# Patient Record
Sex: FEMALE | Race: BLACK OR AFRICAN AMERICAN | NOT HISPANIC OR LATINO | Employment: UNEMPLOYED | RURAL
[De-identification: names, ages, dates, MRNs, and addresses within clinical notes are randomized per-mention and may not be internally consistent; named-entity substitution may affect disease eponyms.]

---

## 2018-10-01 ENCOUNTER — HISTORICAL (OUTPATIENT)
Dept: ADMINISTRATIVE | Facility: HOSPITAL | Age: 33
End: 2018-10-01

## 2018-10-04 LAB
LAB AP CLINICAL INFORMATION: NORMAL
LAB AP COMMENTS: NORMAL
LAB AP GENERAL CAT - HISTORICAL: NORMAL
LAB AP INTERPRETATION/RESULT - HISTORICAL: NORMAL
LAB AP SPECIMEN ADEQUACY - HISTORICAL: NORMAL
LAB AP SPECIMEN SUBMITTED - HISTORICAL: NORMAL

## 2021-11-28 ENCOUNTER — TELEPHONE (OUTPATIENT)
Dept: EMERGENCY MEDICINE | Facility: HOSPITAL | Age: 36
End: 2021-11-28
Payer: MEDICAID

## 2022-07-23 ENCOUNTER — HOSPITAL ENCOUNTER (EMERGENCY)
Facility: HOSPITAL | Age: 37
Discharge: HOME | End: 2022-07-23
Payer: MEDICAID

## 2022-07-23 VITALS
HEIGHT: 64 IN | OXYGEN SATURATION: 100 % | DIASTOLIC BLOOD PRESSURE: 70 MMHG | SYSTOLIC BLOOD PRESSURE: 115 MMHG | TEMPERATURE: 99 F | WEIGHT: 143 LBS | BODY MASS INDEX: 24.41 KG/M2 | RESPIRATION RATE: 20 BRPM | HEART RATE: 97 BPM

## 2022-07-23 DIAGNOSIS — J06.9 VIRAL URI WITH COUGH: Primary | ICD-10-CM

## 2022-07-23 LAB
FLUAV AG UPPER RESP QL IA.RAPID: NEGATIVE
FLUBV AG UPPER RESP QL IA.RAPID: NEGATIVE
SARS-COV+SARS-COV-2 AG RESP QL IA.RAPID: NEGATIVE

## 2022-07-23 PROCEDURE — 87428 SARSCOV & INF VIR A&B AG IA: CPT | Performed by: NURSE PRACTITIONER

## 2022-07-23 PROCEDURE — 99282 PR EMERGENCY DEPT VISIT,LEVEL II: ICD-10-PCS | Mod: ,,, | Performed by: NURSE PRACTITIONER

## 2022-07-23 PROCEDURE — 99282 EMERGENCY DEPT VISIT SF MDM: CPT | Mod: ,,, | Performed by: NURSE PRACTITIONER

## 2022-07-23 PROCEDURE — 99283 EMERGENCY DEPT VISIT LOW MDM: CPT

## 2022-07-23 RX ORDER — IBUPROFEN 800 MG/1
800 TABLET ORAL 3 TIMES DAILY
COMMUNITY
Start: 2022-05-09 | End: 2022-12-19 | Stop reason: SDUPTHER

## 2022-07-23 RX ORDER — CYCLOBENZAPRINE HCL 10 MG
10 TABLET ORAL NIGHTLY
COMMUNITY
Start: 2022-05-09 | End: 2023-10-24 | Stop reason: SDUPTHER

## 2022-07-23 RX ORDER — AMITRIPTYLINE HYDROCHLORIDE 25 MG/1
25 TABLET, FILM COATED ORAL NIGHTLY
COMMUNITY
Start: 2022-07-11 | End: 2022-08-31 | Stop reason: CLARIF

## 2022-07-23 NOTE — Clinical Note
"April "April" Leodan was seen and treated in our emergency department on 7/23/2022.     COVID-19 is present in our communities across the state. There is limited testing for COVID at this time, so not all patients can be tested. In this situation, your employee meets the following criteria:    Windy Alcocer has met the criteria for COVID-19 testing and has a NEGATIVE result. The employee can return to work once they are asymptomatic for 24 hours without the use of fever reducing medications (Tylenol, Motrin, etc).     If the employee is not fully vaccinated and had a close contact:  · Retest at 5 to 7 days post-exposure  · If possible, it is recommended that they quarantine for 5 days from the time of contact regardless of their test status.  · A mask should be worn post quarantine for 5 days.    If you have any questions or concerns, or if I can be of further assistance, please do not hesitate to contact me.    Sincerely,             HARDY Nunes"

## 2022-07-24 NOTE — DISCHARGE INSTRUCTIONS
Use over the counter Ibuprofen/Tylenol as needed for pain/fever.  Drink plenty of water.  Take over the counter meds for nasal congestion. Return for shortness of breath or chest pain.

## 2022-07-24 NOTE — ED PROVIDER NOTES
Encounter Date: 7/23/2022       History     Chief Complaint   Patient presents with    COVID-19 Concerns    Cough    Headache     Started 1 day ago     37 year old AAF presents to the ER after COVID exposure with c/o cough, nasal drainage/congestion since yesterday. Denies chest pain and SOB.    The history is provided by the patient.   Influenza  This is a new problem. The current episode started yesterday. The problem occurs constantly. The problem has not changed since onset.Pertinent negatives include no chest pain, no abdominal pain, no headaches and no shortness of breath. Nothing aggravates the symptoms. Nothing relieves the symptoms. She has tried nothing for the symptoms.     Review of patient's allergies indicates:  No Known Allergies  History reviewed. No pertinent past medical history.  Past Surgical History:   Procedure Laterality Date    BREAST SURGERY       History reviewed. No pertinent family history.  Social History     Tobacco Use    Smoking status: Current Some Day Smoker     Types: Cigars    Smokeless tobacco: Never Used   Substance Use Topics    Alcohol use: Never    Drug use: Never     Review of Systems   Constitutional: Negative for chills, fatigue and fever.   HENT: Positive for congestion and rhinorrhea. Negative for dental problem, drooling, ear discharge, ear pain, facial swelling, hearing loss, mouth sores, nosebleeds, postnasal drip, sinus pressure, sinus pain, sneezing and sore throat.    Respiratory: Negative for cough, shortness of breath and wheezing.    Cardiovascular: Negative for chest pain.   Gastrointestinal: Negative for abdominal pain, diarrhea, nausea and vomiting.   Genitourinary: Negative for dysuria.   Musculoskeletal: Negative for back pain.   Skin: Negative for rash.   Neurological: Negative for weakness and headaches.   Hematological: Does not bruise/bleed easily.       Physical Exam     Initial Vitals [07/23/22 2116]   BP Pulse Resp Temp SpO2   115/70 97 20  98.7 °F (37.1 °C) 100 %      MAP       --         Physical Exam    Nursing note and vitals reviewed.  Constitutional: She appears well-developed and well-nourished. She is not diaphoretic. She is cooperative.  Non-toxic appearance. She does not have a sickly appearance. She does not appear ill. No distress. She is not intubated.   HENT:   Head: Normocephalic and atraumatic.   Eyes: Pupils are equal, round, and reactive to light.   Neck: Neck supple.   Cardiovascular: Normal rate, regular rhythm, normal heart sounds and intact distal pulses. Exam reveals no gallop and no friction rub.    No murmur heard.  Pulmonary/Chest: Effort normal and breath sounds normal. No accessory muscle usage. No apnea, no tachypnea and no bradypnea. She is not intubated. No respiratory distress. She has no decreased breath sounds. She has no wheezes. She has no rhonchi. She has no rales. She exhibits no tenderness.   Musculoskeletal:      Cervical back: Neck supple.     Neurological: She is alert and oriented to person, place, and time.   Skin: Skin is warm and dry. Capillary refill takes less than 2 seconds.   Psychiatric: She has a normal mood and affect.         Medical Screening Exam   See Full Note    ED Course   Procedures  Labs Reviewed   SARS-COV2 (COVID) W/ FLU ANTIGEN - Normal    Narrative:     Negative SARS-CoV results should not be used as the sole basis for treatment or patient management decisions; negative results should be considered in the context of a patient's recent exposures, history and the presene of clinical signs and symptoms consistent with COVID-19.  Negative results should be treated as presumptive and confirmed by molecular assay, if necessary for patient management.          Imaging Results    None          Medications - No data to display                    Clinical Impression:   Final diagnoses:  [J06.9] Viral URI with cough (Primary)          ED Disposition Condition    Discharge Stable        ED  Prescriptions     None        Follow-up Information     Follow up With Specialties Details Why Contact Info    [place] [facility]  Schedule an appointment as soon as possible for a visit in 3 days As needed, If symptoms worsen [address] [facility] [phone]           Yue Pena, HARDY  07/23/22 [id number]

## 2022-08-30 ENCOUNTER — HOSPITAL ENCOUNTER (EMERGENCY)
Facility: HOSPITAL | Age: 37
Discharge: HOME | End: 2022-08-30
Payer: MEDICAID

## 2022-08-30 VITALS
BODY MASS INDEX: 21.33 KG/M2 | RESPIRATION RATE: 18 BRPM | TEMPERATURE: 98 F | SYSTOLIC BLOOD PRESSURE: 129 MMHG | HEIGHT: 65 IN | OXYGEN SATURATION: 97 % | WEIGHT: 128 LBS | HEART RATE: 88 BPM | DIASTOLIC BLOOD PRESSURE: 89 MMHG

## 2022-08-30 DIAGNOSIS — F43.29 GRIEF REACTION WITH PROLONGED BEREAVEMENT: Primary | ICD-10-CM

## 2022-08-30 PROCEDURE — 99282 EMERGENCY DEPT VISIT SF MDM: CPT | Mod: ,,, | Performed by: NURSE PRACTITIONER

## 2022-08-30 PROCEDURE — 99283 EMERGENCY DEPT VISIT LOW MDM: CPT

## 2022-08-30 PROCEDURE — 99282 PR EMERGENCY DEPT VISIT,LEVEL II: ICD-10-PCS | Mod: ,,, | Performed by: NURSE PRACTITIONER

## 2022-08-30 NOTE — Clinical Note
"April "April" Leodan was seen and treated in our emergency department on 8/30/2022.  She may return to work on 08/31/2022.       If you have any questions or concerns, please don't hesitate to call.      HARDY Small"

## 2022-08-31 ENCOUNTER — OFFICE VISIT (OUTPATIENT)
Dept: FAMILY MEDICINE | Facility: CLINIC | Age: 37
End: 2022-08-31
Payer: MEDICAID

## 2022-08-31 VITALS
HEART RATE: 88 BPM | WEIGHT: 128 LBS | BODY MASS INDEX: 21.33 KG/M2 | DIASTOLIC BLOOD PRESSURE: 88 MMHG | SYSTOLIC BLOOD PRESSURE: 130 MMHG | HEIGHT: 65 IN

## 2022-08-31 DIAGNOSIS — F41.9 ANXIETY: ICD-10-CM

## 2022-08-31 DIAGNOSIS — F32.9 REACTIVE DEPRESSION: Primary | ICD-10-CM

## 2022-08-31 PROCEDURE — 99203 PR OFFICE/OUTPT VISIT, NEW, LEVL III, 30-44 MIN: ICD-10-PCS | Mod: ,,, | Performed by: FAMILY MEDICINE

## 2022-08-31 PROCEDURE — 3075F SYST BP GE 130 - 139MM HG: CPT | Mod: CPTII,,, | Performed by: FAMILY MEDICINE

## 2022-08-31 PROCEDURE — 1160F RVW MEDS BY RX/DR IN RCRD: CPT | Mod: CPTII,,, | Performed by: FAMILY MEDICINE

## 2022-08-31 PROCEDURE — 3008F PR BODY MASS INDEX (BMI) DOCUMENTED: ICD-10-PCS | Mod: CPTII,,, | Performed by: FAMILY MEDICINE

## 2022-08-31 PROCEDURE — 1160F PR REVIEW ALL MEDS BY PRESCRIBER/CLIN PHARMACIST DOCUMENTED: ICD-10-PCS | Mod: CPTII,,, | Performed by: FAMILY MEDICINE

## 2022-08-31 PROCEDURE — 3008F BODY MASS INDEX DOCD: CPT | Mod: CPTII,,, | Performed by: FAMILY MEDICINE

## 2022-08-31 PROCEDURE — 1159F MED LIST DOCD IN RCRD: CPT | Mod: CPTII,,, | Performed by: FAMILY MEDICINE

## 2022-08-31 PROCEDURE — 3079F DIAST BP 80-89 MM HG: CPT | Mod: CPTII,,, | Performed by: FAMILY MEDICINE

## 2022-08-31 PROCEDURE — 1159F PR MEDICATION LIST DOCUMENTED IN MEDICAL RECORD: ICD-10-PCS | Mod: CPTII,,, | Performed by: FAMILY MEDICINE

## 2022-08-31 PROCEDURE — 3079F PR MOST RECENT DIASTOLIC BLOOD PRESSURE 80-89 MM HG: ICD-10-PCS | Mod: CPTII,,, | Performed by: FAMILY MEDICINE

## 2022-08-31 PROCEDURE — 3075F PR MOST RECENT SYSTOLIC BLOOD PRESS GE 130-139MM HG: ICD-10-PCS | Mod: CPTII,,, | Performed by: FAMILY MEDICINE

## 2022-08-31 PROCEDURE — 99203 OFFICE O/P NEW LOW 30 MIN: CPT | Mod: ,,, | Performed by: FAMILY MEDICINE

## 2022-08-31 RX ORDER — ALPRAZOLAM 0.5 MG/1
0.5 TABLET ORAL 2 TIMES DAILY PRN
Qty: 15 TABLET | Refills: 0 | Status: SHIPPED | OUTPATIENT
Start: 2022-08-31 | End: 2022-09-19 | Stop reason: SDUPTHER

## 2022-08-31 RX ORDER — SERTRALINE HYDROCHLORIDE 25 MG/1
25 TABLET, FILM COATED ORAL DAILY
Qty: 30 TABLET | Refills: 1 | Status: SHIPPED | OUTPATIENT
Start: 2022-08-31 | End: 2022-09-19 | Stop reason: SDUPTHER

## 2022-08-31 NOTE — PROGRESS NOTES
Ezra Morrison IV, DO  [facility]  [address] [place] [address]  Phone: [phone]      Subjective     Name: Windy Alcocer   Sex: female  YOB: 1985 (37 y.o.)  MRN: [id number]  Visit Date: 08/31/2022   Chief Complaint: Follow-up (F/u from ED visit )        HISTORY OF PRESENT ILLNESS:    Patient presents as a follow-up from emergency room visit.  She has recently lost her only sister and is suffering grief related to this.  PHQ-9 office score 14 today.  After discussing with the patient stages of grief and expectations agreement was made to begin sertraline with the expectation that it will take a few weeks to start working.  I have prescribed 0.5 mg Xanax p.o. up to b.i.d. p.r.n. for anxiety in the meantime and help her sleep.  This was limited to 15 pills and she is aware of the drugs class and its use.  Patient to return to clinic in 1 month for repeat PHQ-9, to discuss coping mechanisms, and titrate medication.      PAST MEDICAL HISTORY:  Significant Diagnoses - Patient  has no past medical history on file.  Medications - Patient has a current medication list which includes the following long-term medication(s): sertraline.   Allergies - Patient has No Known Allergies.  Surgeries - Patient  has a past surgical history that includes Breast surgery.  Family History - Patient family history is not on file.      SOCIAL HISTORY:  Tobacco - Patient  reports that she has been smoking cigars. She has never used smokeless tobacco.   Alcohol - Patient  reports no history of alcohol use.   Recreational Drugs - Patient  reports no history of drug use.       Review of Systems   Constitutional:  Negative for fatigue and fever.   HENT:  Negative for nasal congestion and sore throat.    Respiratory:  Negative for cough and shortness of breath.    Cardiovascular:  Negative for chest pain.   Gastrointestinal:  Negative for abdominal pain, nausea and vomiting.   Genitourinary:  " Negative for dysuria.   Musculoskeletal:  Negative for myalgias.   Integumentary:  Negative for rash.   Neurological:  Negative for dizziness, weakness and headaches.   Psychiatric/Behavioral:  Positive for decreased concentration and sleep disturbance. Negative for agitation, behavioral problems, confusion, dysphoric mood, hallucinations, self-injury and suicidal ideas. The patient is nervous/anxious and is hyperactive.         No past medical history on file.     Review of patient's allergies indicates:  No Known Allergies     Past Surgical History:   Procedure Laterality Date    BREAST SURGERY          No family history on file.    Current Outpatient Medications   Medication Instructions    ALPRAZolam (XANAX) 0.5 mg, Oral, 2 times daily PRN    cyclobenzaprine (FLEXERIL) 10 mg, Oral, Nightly    ibuprofen (ADVIL,MOTRIN) 800 mg, Oral, 3 times daily    sertraline (ZOLOFT) 25 mg, Oral, Daily        Objective     /88   Pulse 88   Ht 5' 5" (1.651 m)   Wt 58.1 kg (128 lb)   BMI 21.30 kg/m²     Physical Exam  Constitutional:       General: She is not in acute distress.     Appearance: Normal appearance. She is not ill-appearing.   HENT:      Head: Normocephalic and atraumatic.      Right Ear: External ear normal.      Left Ear: External ear normal.   Eyes:      Extraocular Movements: Extraocular movements intact.      Conjunctiva/sclera: Conjunctivae normal.   Cardiovascular:      Rate and Rhythm: Normal rate.      Heart sounds: No murmur heard.  Pulmonary:      Effort: Pulmonary effort is normal.   Musculoskeletal:      Cervical back: Normal range of motion.   Skin:     General: Skin is warm and dry.      Coloration: Skin is not jaundiced.      Findings: No rash.   Neurological:      Mental Status: She is alert.   Psychiatric:         Mood and Affect: Mood normal.        All recently obtained labs have been reviewed and discussed in detail with the patient.   Assessment     1. Reactive depression    2. " Anxiety         Plan        Problem List Items Addressed This Visit    None  Visit Diagnoses       Reactive depression    -  Primary    Relevant Medications    sertraline (ZOLOFT) 25 MG tablet    Anxiety        Relevant Medications    ALPRAZolam (XANAX) 0.5 MG tablet            No follow-ups on file.    Ezra Morrison DO  [facility]

## 2022-08-31 NOTE — ED PROVIDER NOTES
Encounter Date: 8/30/2022       History     Chief Complaint   Patient presents with    Anxiety     Pt c/o increased anxiety since the death of her sister one month ago.      Patient presents to the ED with complaints of anxiety, reports she lost her sister one month ago and she has been struggling with grief since then. Denies any homicidal or suicidal ideations.       Review of patient's allergies indicates:  No Known Allergies  No past medical history on file.  Past Surgical History:   Procedure Laterality Date    BREAST SURGERY       No family history on file.  Social History     Tobacco Use    Smoking status: Some Days     Types: Cigars    Smokeless tobacco: Never   Substance Use Topics    Alcohol use: Never    Drug use: Never     Review of Systems   Constitutional: Negative.    Respiratory: Negative.     Cardiovascular: Negative.    Musculoskeletal: Negative.    Skin: Negative.    Neurological: Negative.    Psychiatric/Behavioral:  The patient is nervous/anxious.    All other systems reviewed and are negative.    Physical Exam     Initial Vitals [08/30/22 2134]   BP Pulse Resp Temp SpO2   129/89 96 18 98.3 °F (36.8 °C) 100 %      MAP       --         Physical Exam    Vitals reviewed.  Constitutional: She appears well-developed and well-nourished.   Neck: Neck supple.   Normal range of motion.  Cardiovascular:  Normal rate, regular rhythm, normal heart sounds and intact distal pulses.           Pulmonary/Chest: Breath sounds normal.   Musculoskeletal:         General: Normal range of motion.      Cervical back: Normal range of motion and neck supple.     Neurological: She is alert and oriented to person, place, and time. She has normal strength. GCS score is 15. GCS eye subscore is 4. GCS verbal subscore is 5. GCS motor subscore is 6.   Skin: Skin is warm. Capillary refill takes less than 2 seconds.   Psychiatric: Her behavior is normal. Judgment and thought content normal. Her mood appears anxious. Her speech  is rapid and/or pressured. Cognition and memory are normal.       Medical Screening Exam   See Full Note    ED Course   Procedures  Labs Reviewed - No data to display       Imaging Results    None          Medications - No data to display  Medical Decision Making:   ED Management:  Patient drove herself, patient was instructed to follow up with [facility] tomorrow.                  Clinical Impression:   Final diagnoses:  [F43.29] Grief reaction with prolonged bereavement (Primary)      ED Disposition Condition    Discharge Stable          ED Prescriptions    None       Follow-up Information    None          HARDY Small  08/30/22 [id number]

## 2022-08-31 NOTE — ADDENDUM NOTE
Encounter addended by: Heidi Nova on: 8/31/2022 10:35 AM   Actions taken: SmartForm saved, Flowsheet accepted

## 2022-08-31 NOTE — LETTER
August 31, 2022      [facility]  [address]  [place] [address]  Phone: [phone]  Fax: [phone]       Patient: Windy Alcocer   YOB: 1985  Date of Visit: 08/31/2022    To Whom It May Concern:    Mena Alcocer  was at [facility] on 08/31/2022. The patient may return to work/school on 09/05/22 with no restrictions. If you have any questions or concerns, or if I can be of further assistance, please do not hesitate to contact me.    Sincerely,    Dominique Mendoza RN

## 2022-09-19 ENCOUNTER — OFFICE VISIT (OUTPATIENT)
Dept: FAMILY MEDICINE | Facility: CLINIC | Age: 37
End: 2022-09-19
Payer: MEDICAID

## 2022-09-19 VITALS
HEART RATE: 84 BPM | WEIGHT: 128 LBS | BODY MASS INDEX: 21.33 KG/M2 | DIASTOLIC BLOOD PRESSURE: 88 MMHG | SYSTOLIC BLOOD PRESSURE: 130 MMHG | HEIGHT: 65 IN

## 2022-09-19 DIAGNOSIS — F32.9 REACTIVE DEPRESSION: ICD-10-CM

## 2022-09-19 DIAGNOSIS — F41.9 ANXIETY: ICD-10-CM

## 2022-09-19 PROCEDURE — 3079F PR MOST RECENT DIASTOLIC BLOOD PRESSURE 80-89 MM HG: ICD-10-PCS | Mod: CPTII,,, | Performed by: FAMILY MEDICINE

## 2022-09-19 PROCEDURE — 1160F RVW MEDS BY RX/DR IN RCRD: CPT | Mod: CPTII,,, | Performed by: FAMILY MEDICINE

## 2022-09-19 PROCEDURE — 1160F PR REVIEW ALL MEDS BY PRESCRIBER/CLIN PHARMACIST DOCUMENTED: ICD-10-PCS | Mod: CPTII,,, | Performed by: FAMILY MEDICINE

## 2022-09-19 PROCEDURE — 99213 OFFICE O/P EST LOW 20 MIN: CPT | Mod: ,,, | Performed by: FAMILY MEDICINE

## 2022-09-19 PROCEDURE — 1159F PR MEDICATION LIST DOCUMENTED IN MEDICAL RECORD: ICD-10-PCS | Mod: CPTII,,, | Performed by: FAMILY MEDICINE

## 2022-09-19 PROCEDURE — 3079F DIAST BP 80-89 MM HG: CPT | Mod: CPTII,,, | Performed by: FAMILY MEDICINE

## 2022-09-19 PROCEDURE — 3008F BODY MASS INDEX DOCD: CPT | Mod: CPTII,,, | Performed by: FAMILY MEDICINE

## 2022-09-19 PROCEDURE — 1159F MED LIST DOCD IN RCRD: CPT | Mod: CPTII,,, | Performed by: FAMILY MEDICINE

## 2022-09-19 PROCEDURE — 3008F PR BODY MASS INDEX (BMI) DOCUMENTED: ICD-10-PCS | Mod: CPTII,,, | Performed by: FAMILY MEDICINE

## 2022-09-19 PROCEDURE — 99213 PR OFFICE/OUTPT VISIT, EST, LEVL III, 20-29 MIN: ICD-10-PCS | Mod: ,,, | Performed by: FAMILY MEDICINE

## 2022-09-19 PROCEDURE — 3075F SYST BP GE 130 - 139MM HG: CPT | Mod: CPTII,,, | Performed by: FAMILY MEDICINE

## 2022-09-19 PROCEDURE — 3075F PR MOST RECENT SYSTOLIC BLOOD PRESS GE 130-139MM HG: ICD-10-PCS | Mod: CPTII,,, | Performed by: FAMILY MEDICINE

## 2022-09-19 RX ORDER — ALPRAZOLAM 0.5 MG/1
0.5 TABLET ORAL 2 TIMES DAILY PRN
Qty: 15 TABLET | Refills: 0 | Status: SHIPPED | OUTPATIENT
Start: 2022-09-19 | End: 2022-09-28

## 2022-09-19 RX ORDER — SERTRALINE HYDROCHLORIDE 25 MG/1
25 TABLET, FILM COATED ORAL DAILY
Qty: 30 TABLET | Refills: 1 | Status: SHIPPED | OUTPATIENT
Start: 2022-09-19 | End: 2022-09-28

## 2022-09-19 RX ORDER — BUSPIRONE HYDROCHLORIDE 5 MG/1
5 TABLET ORAL 2 TIMES DAILY
Qty: 60 TABLET | Refills: 0 | Status: SHIPPED | OUTPATIENT
Start: 2022-09-19 | End: 2022-09-28

## 2022-09-19 NOTE — PROGRESS NOTES
Ezra Morrison IV, DO  [facility]  [address] [place] [address]  Phone: [phone]      Subjective     Name: Windy Alcocer   Sex: female  YOB: 1985 (37 y.o.)  MRN: [id number]  Visit Date: 09/19/2022   Chief Complaint: Follow-up        HISTORY OF PRESENT ILLNESS:    Patient presents to clinic today for follow-up.  Previously major depression with a PHQ-9 score of 14.  We started sertraline I allowed for some Xanax 0.5 mg for breakthrough anxiety.  PHQ-9 in office today of 7.  This is a large improvement over previous and we will continue the sertraline as we are for now.  She still experiencing anxiety due to situational factors and I am going to continue to treat that with Xanax in the meantime.  I am also and BusPar.  I am not sure if she has tried it yet and she states that she thinks she has but was unable to tolerate it.  We will trial it at this time patient is to return to clinic with worsening symptoms.        PAST MEDICAL HISTORY:  Significant Diagnoses - Patient  has no past medical history on file.  Medications - Patient has a current medication list which includes the following long-term medication(s): sertraline.   Allergies - Patient has No Known Allergies.  Surgeries - Patient  has a past surgical history that includes Breast surgery.  Family History - Patient family history is not on file.      SOCIAL HISTORY:  Tobacco - Patient  reports that she has been smoking cigars. She has never used smokeless tobacco.   Alcohol - Patient  reports no history of alcohol use.   Recreational Drugs - Patient  reports no history of drug use.       Review of Systems   Constitutional:  Negative for fatigue and fever.   HENT:  Negative for nasal congestion and sore throat.    Respiratory:  Negative for cough and shortness of breath.    Cardiovascular:  Negative for chest pain.   Gastrointestinal:  Negative for abdominal pain, nausea and vomiting.   Genitourinary:   "Negative for dysuria.   Musculoskeletal:  Negative for myalgias.   Integumentary:  Negative for rash.   Neurological:  Negative for dizziness, weakness and headaches.        No past medical history on file.     Review of patient's allergies indicates:  No Known Allergies     Past Surgical History:   Procedure Laterality Date    BREAST SURGERY          No family history on file.    Current Outpatient Medications   Medication Instructions    ALPRAZolam (XANAX) 0.5 mg, Oral, 2 times daily PRN    cyclobenzaprine (FLEXERIL) 10 mg, Oral, Nightly    ibuprofen (ADVIL,MOTRIN) 800 mg, Oral, 3 times daily    sertraline (ZOLOFT) 25 mg, Oral, Daily        Objective     /88   Pulse 84   Ht 5' 5" (1.651 m)   Wt 58.1 kg (128 lb)   BMI 21.30 kg/m²     Physical Exam  Constitutional:       General: She is not in acute distress.     Appearance: Normal appearance. She is not ill-appearing.   HENT:      Head: Normocephalic and atraumatic.      Right Ear: External ear normal.      Left Ear: External ear normal.   Eyes:      Extraocular Movements: Extraocular movements intact.      Conjunctiva/sclera: Conjunctivae normal.   Cardiovascular:      Rate and Rhythm: Normal rate.      Heart sounds: No murmur heard.  Pulmonary:      Effort: Pulmonary effort is normal.   Musculoskeletal:      Cervical back: Normal range of motion.   Skin:     General: Skin is warm and dry.      Coloration: Skin is not jaundiced.      Findings: No rash.   Neurological:      Mental Status: She is alert.   Psychiatric:         Mood and Affect: Mood normal.        All recently obtained labs have been reviewed and discussed in detail with the patient.   Assessment     No diagnosis found.     Plan        Problem List Items Addressed This Visit    None      No follow-ups on file.    Ezra Morrison, DO  [facility]         "

## 2022-09-28 ENCOUNTER — OFFICE VISIT (OUTPATIENT)
Dept: FAMILY MEDICINE | Facility: CLINIC | Age: 37
End: 2022-09-28
Payer: MEDICAID

## 2022-09-28 VITALS
WEIGHT: 128 LBS | HEIGHT: 65 IN | DIASTOLIC BLOOD PRESSURE: 83 MMHG | SYSTOLIC BLOOD PRESSURE: 130 MMHG | BODY MASS INDEX: 21.33 KG/M2 | HEART RATE: 110 BPM

## 2022-09-28 DIAGNOSIS — F32.9 REACTIVE DEPRESSION: ICD-10-CM

## 2022-09-28 DIAGNOSIS — F41.9 ANXIETY: Primary | ICD-10-CM

## 2022-09-28 PROCEDURE — 3079F DIAST BP 80-89 MM HG: CPT | Mod: CPTII,,, | Performed by: FAMILY MEDICINE

## 2022-09-28 PROCEDURE — 3008F BODY MASS INDEX DOCD: CPT | Mod: CPTII,,, | Performed by: FAMILY MEDICINE

## 2022-09-28 PROCEDURE — 1159F PR MEDICATION LIST DOCUMENTED IN MEDICAL RECORD: ICD-10-PCS | Mod: CPTII,,, | Performed by: FAMILY MEDICINE

## 2022-09-28 PROCEDURE — 3075F SYST BP GE 130 - 139MM HG: CPT | Mod: CPTII,,, | Performed by: FAMILY MEDICINE

## 2022-09-28 PROCEDURE — 3079F PR MOST RECENT DIASTOLIC BLOOD PRESSURE 80-89 MM HG: ICD-10-PCS | Mod: CPTII,,, | Performed by: FAMILY MEDICINE

## 2022-09-28 PROCEDURE — 1159F MED LIST DOCD IN RCRD: CPT | Mod: CPTII,,, | Performed by: FAMILY MEDICINE

## 2022-09-28 PROCEDURE — 99213 PR OFFICE/OUTPT VISIT, EST, LEVL III, 20-29 MIN: ICD-10-PCS | Mod: ,,, | Performed by: FAMILY MEDICINE

## 2022-09-28 PROCEDURE — 99213 OFFICE O/P EST LOW 20 MIN: CPT | Mod: ,,, | Performed by: FAMILY MEDICINE

## 2022-09-28 PROCEDURE — 1160F PR REVIEW ALL MEDS BY PRESCRIBER/CLIN PHARMACIST DOCUMENTED: ICD-10-PCS | Mod: CPTII,,, | Performed by: FAMILY MEDICINE

## 2022-09-28 PROCEDURE — 3008F PR BODY MASS INDEX (BMI) DOCUMENTED: ICD-10-PCS | Mod: CPTII,,, | Performed by: FAMILY MEDICINE

## 2022-09-28 PROCEDURE — 3075F PR MOST RECENT SYSTOLIC BLOOD PRESS GE 130-139MM HG: ICD-10-PCS | Mod: CPTII,,, | Performed by: FAMILY MEDICINE

## 2022-09-28 PROCEDURE — 1160F RVW MEDS BY RX/DR IN RCRD: CPT | Mod: CPTII,,, | Performed by: FAMILY MEDICINE

## 2022-09-28 RX ORDER — CLONAZEPAM 0.5 MG/1
0.5 TABLET ORAL DAILY
Qty: 30 TABLET | Refills: 2 | Status: SHIPPED | OUTPATIENT
Start: 2022-09-28 | End: 2022-12-19 | Stop reason: SDUPTHER

## 2022-09-28 RX ORDER — SERTRALINE HYDROCHLORIDE 50 MG/1
50 TABLET, FILM COATED ORAL DAILY
Qty: 30 TABLET | Refills: 3 | Status: SHIPPED | OUTPATIENT
Start: 2022-09-28 | End: 2022-12-19 | Stop reason: SDUPTHER

## 2022-09-28 NOTE — PROGRESS NOTES
Ezra Morrison IV, DO  [facility]  [address] [place] [address]  Phone: [phone]      Subjective     Name: Windy Alcocer   Sex: female  YOB: 1985 (37 y.o.)  MRN: [id number]  Visit Date: 09/28/2022   Chief Complaint: Follow-up (Medication refill )        HISTORY OF PRESENT ILLNESS:    Patient presents clinic today for follow-up.  She has a history of anxiety and depression.  Today's PHQ 2 score of 1.  Ameya 7 score still elevated with moderate to severe anxiety.  Patient is currently on Zoloft 25 mg p.o. q.d. and I will be increasing that to 50 mg p.o. q.d. today we also did a trial of BusPar which patient could not handle side effects.  So I will be discontinuing this medication today I will also be discontinuing the Xanax that we were using for breakthrough anxiety.  Patient has many extremity was factors including her mother now in ICU.  We will be changing to Klonopin 0.5 mg p.o. q.d. will give that a trial for the next couple months.  Patient should return to clinic in 2 months or sooner if needed.        This document was dictated using [employer] fluency direct.  It is subject to dictation errors.    PAST MEDICAL HISTORY:  Significant Diagnoses - Patient  has no past medical history on file.  Medications - Patient has a current medication list which includes the following long-term medication(s): alprazolam, buspirone, and sertraline.   Allergies - Patient has No Known Allergies.  Surgeries - Patient  has a past surgical history that includes Breast surgery.  Family History - Patient family history is not on file.      SOCIAL HISTORY:  Tobacco - Patient  reports that she has been smoking cigars. She has never used smokeless tobacco.   Alcohol - Patient  reports no history of alcohol use.   Recreational Drugs - Patient  reports no history of drug use.       Review of Systems   Constitutional:  Negative for fatigue and fever.   HENT:  Negative for nasal  "congestion and sore throat.    Respiratory:  Negative for cough and shortness of breath.    Cardiovascular:  Negative for chest pain.   Gastrointestinal:  Negative for abdominal pain, nausea and vomiting.   Genitourinary:  Negative for dysuria.   Musculoskeletal:  Negative for myalgias.   Integumentary:  Negative for rash.   Neurological:  Negative for dizziness, weakness and headaches.        No past medical history on file.     Review of patient's allergies indicates:  No Known Allergies     Past Surgical History:   Procedure Laterality Date    BREAST SURGERY          No family history on file.    Current Outpatient Medications   Medication Instructions    ALPRAZolam (XANAX) 0.5 mg, Oral, 2 times daily PRN    busPIRone (BUSPAR) 5 mg, Oral, 2 times daily    cyclobenzaprine (FLEXERIL) 10 mg, Oral, Nightly    ibuprofen (ADVIL,MOTRIN) 800 mg, Oral, 3 times daily    sertraline (ZOLOFT) 25 mg, Oral, Daily        Objective     /83   Pulse 110   Ht 5' 5" (1.651 m)   Wt 58.1 kg (128 lb)   BMI 21.30 kg/m²     Physical Exam  Constitutional:       General: She is not in acute distress.     Appearance: Normal appearance. She is not ill-appearing.   HENT:      Head: Normocephalic and atraumatic.      Right Ear: External ear normal.      Left Ear: External ear normal.   Eyes:      Extraocular Movements: Extraocular movements intact.      Conjunctiva/sclera: Conjunctivae normal.   Cardiovascular:      Rate and Rhythm: Normal rate.      Heart sounds: No murmur heard.  Pulmonary:      Effort: Pulmonary effort is normal.   Musculoskeletal:      Cervical back: Normal range of motion.   Skin:     General: Skin is warm and dry.      Coloration: Skin is not jaundiced.      Findings: No rash.   Neurological:      Mental Status: She is alert.   Psychiatric:         Mood and Affect: Mood normal.        All recently obtained labs have been reviewed and discussed in detail with the patient.   Assessment     No diagnosis " found.     Plan        Problem List Items Addressed This Visit    None      No follow-ups on file.    Patient advised that is symptoms worsen, they should call or report directly to local emergency department.    Ezra Morrison,   [facility]

## 2022-10-19 ENCOUNTER — OFFICE VISIT (OUTPATIENT)
Dept: FAMILY MEDICINE | Facility: CLINIC | Age: 37
End: 2022-10-19
Payer: MEDICAID

## 2022-10-19 VITALS
WEIGHT: 127 LBS | SYSTOLIC BLOOD PRESSURE: 111 MMHG | DIASTOLIC BLOOD PRESSURE: 79 MMHG | BODY MASS INDEX: 21.16 KG/M2 | HEART RATE: 125 BPM | HEIGHT: 65 IN

## 2022-10-19 DIAGNOSIS — R35.0 FREQUENCY OF URINATION: Primary | ICD-10-CM

## 2022-10-19 DIAGNOSIS — R82.81 PYURIA: ICD-10-CM

## 2022-10-19 LAB
BILIRUB SERPL-MCNC: NORMAL MG/DL
BLOOD URINE, POC: NORMAL
COLOR, POC UA: NORMAL
GLUCOSE UR QL STRIP: 100
KETONES UR QL STRIP: NORMAL
LEUKOCYTE ESTERASE URINE, POC: NORMAL
NITRITE, POC UA: NORMAL
PH, POC UA: 5.5
PROTEIN, POC: 30
SPECIFIC GRAVITY, POC UA: 1.02
UROBILINOGEN, POC UA: 2

## 2022-10-19 PROCEDURE — 3078F DIAST BP <80 MM HG: CPT | Mod: CPTII,,, | Performed by: FAMILY MEDICINE

## 2022-10-19 PROCEDURE — 99213 OFFICE O/P EST LOW 20 MIN: CPT | Mod: ,,, | Performed by: FAMILY MEDICINE

## 2022-10-19 PROCEDURE — 3074F SYST BP LT 130 MM HG: CPT | Mod: CPTII,,, | Performed by: FAMILY MEDICINE

## 2022-10-19 PROCEDURE — 99213 PR OFFICE/OUTPT VISIT, EST, LEVL III, 20-29 MIN: ICD-10-PCS | Mod: ,,, | Performed by: FAMILY MEDICINE

## 2022-10-19 PROCEDURE — 3074F PR MOST RECENT SYSTOLIC BLOOD PRESSURE < 130 MM HG: ICD-10-PCS | Mod: CPTII,,, | Performed by: FAMILY MEDICINE

## 2022-10-19 PROCEDURE — 1159F MED LIST DOCD IN RCRD: CPT | Mod: CPTII,,, | Performed by: FAMILY MEDICINE

## 2022-10-19 PROCEDURE — 3078F PR MOST RECENT DIASTOLIC BLOOD PRESSURE < 80 MM HG: ICD-10-PCS | Mod: CPTII,,, | Performed by: FAMILY MEDICINE

## 2022-10-19 PROCEDURE — 81003 URINALYSIS AUTO W/O SCOPE: CPT | Mod: RHCUB | Performed by: FAMILY MEDICINE

## 2022-10-19 PROCEDURE — 1159F PR MEDICATION LIST DOCUMENTED IN MEDICAL RECORD: ICD-10-PCS | Mod: CPTII,,, | Performed by: FAMILY MEDICINE

## 2022-10-19 RX ORDER — SULFAMETHOXAZOLE AND TRIMETHOPRIM 800; 160 MG/1; MG/1
1 TABLET ORAL 2 TIMES DAILY
Qty: 10 TABLET | Refills: 0 | Status: SHIPPED | OUTPATIENT
Start: 2022-10-19 | End: 2022-10-24

## 2022-10-19 RX ORDER — FLUCONAZOLE 150 MG/1
150 TABLET ORAL
Qty: 2 TABLET | Refills: 0 | Status: SHIPPED | OUTPATIENT
Start: 2022-10-19 | End: 2022-10-23

## 2022-10-19 NOTE — PROGRESS NOTES
Ezra Morrison IV, DO  [facility]  [address] [place] [address]  Phone: [phone]      Subjective     Name: Windy Alcocer   Sex: female  YOB: 1985 (37 y.o.)  MRN: [id number]  Visit Date: 10/19/2022   Chief Complaint: Urinary Frequency (Pt thinks she has a uti and needs medication refills)        HISTORY OF PRESENT ILLNESS:    Patient presents to clinic with a chief complaint of dysuria.  She has had some discolored foul-smelling urine for the past couple of days.  I will order a p.o. CT urinalysis here in the clinic.  It was interpreted in the clinic and patient was made aware of results before leaving the clinic.  Pyuria noted on urinalysis.  Will be treating for symptomatic pyuria with Bactrim DS b.i.d. for 5 days.  I will also be including a prescription for Diflucan 150 mg to be taken 3 days apart for yeast infection if symptoms persist symptoms persist.  Otherwise, patient states that she did have to take more of her Klonopin over the past couple of weeks.  Her mother was in the ICU and she was going through a very stressful time.  She is about 8 days from her next refill and I think it would be acceptable for her to continue to refill at this time.      This document was dictated using [employer] fluency direct.  It is subject to dictation errors.    PAST MEDICAL HISTORY:  Significant Diagnoses - Patient  has no past medical history on file.  Medications - Patient has a current medication list which includes the following long-term medication(s): clonazepam and sertraline.   Allergies - Patient has No Known Allergies.  Surgeries - Patient  has a past surgical history that includes Breast surgery.  Family History - Patient family history is not on file.      SOCIAL HISTORY:  Tobacco - Patient  reports that she has been smoking cigars. She has never used smokeless tobacco.   Alcohol - Patient  reports no history of alcohol use.   Recreational Drugs - Patient   "reports no history of drug use.       Review of Systems       No past medical history on file.     Review of patient's allergies indicates:  No Known Allergies     Past Surgical History:   Procedure Laterality Date    BREAST SURGERY          No family history on file.    Current Outpatient Medications   Medication Instructions    clonazePAM (KLONOPIN) 0.5 mg, Oral, Daily    cyclobenzaprine (FLEXERIL) 10 mg, Oral, Nightly    fluconazole (DIFLUCAN) 150 mg, Oral, Every 3 days    ibuprofen (ADVIL,MOTRIN) 800 mg, Oral, 3 times daily    sertraline (ZOLOFT) 50 mg, Oral, Daily    sulfamethoxazole-trimethoprim 800-160mg (BACTRIM DS) 800-160 mg Tab 1 tablet, Oral, 2 times daily        Objective     /79   Pulse (!) 125   Ht 5' 5" (1.651 m)   Wt 57.6 kg (127 lb)   BMI 21.13 kg/m²     Physical Exam     All recently obtained labs have been reviewed and discussed in detail with the patient.   Assessment     1. Frequency of urination    2. Pyuria         Plan        Problem List Items Addressed This Visit    None  Visit Diagnoses       Frequency of urination    -  Primary    Relevant Medications    sulfamethoxazole-trimethoprim 800-160mg (BACTRIM DS) 800-160 mg Tab    fluconazole (DIFLUCAN) 150 MG Tab    Other Relevant Orders    POCT URINALYSIS W/O SCOPE (Completed)    Pyuria        Relevant Medications    sulfamethoxazole-trimethoprim 800-160mg (BACTRIM DS) 800-160 mg Tab    fluconazole (DIFLUCAN) 150 MG Tab            No follow-ups on file.    Patient advised that is symptoms worsen, they should call or report directly to local emergency department.    Ezra Morrison, DO  [facility]         "

## 2022-10-25 ENCOUNTER — HOSPITAL ENCOUNTER (EMERGENCY)
Facility: HOSPITAL | Age: 37
Discharge: HOME | End: 2022-10-25
Payer: MEDICAID

## 2022-10-25 VITALS
SYSTOLIC BLOOD PRESSURE: 120 MMHG | RESPIRATION RATE: 18 BRPM | HEART RATE: 105 BPM | DIASTOLIC BLOOD PRESSURE: 71 MMHG | WEIGHT: 130 LBS | OXYGEN SATURATION: 98 % | HEIGHT: 63 IN | BODY MASS INDEX: 23.04 KG/M2 | TEMPERATURE: 98 F

## 2022-10-25 DIAGNOSIS — R30.0 DYSURIA: Primary | ICD-10-CM

## 2022-10-25 LAB
BACTERIA #/AREA URNS HPF: ABNORMAL /HPF
BILIRUB UR QL STRIP: ABNORMAL
CLARITY UR: ABNORMAL
COLOR UR: YELLOW
FLUAV AG UPPER RESP QL IA.RAPID: NEGATIVE
FLUBV AG UPPER RESP QL IA.RAPID: NEGATIVE
GLUCOSE SERPL-MCNC: 128 MG/DL (ref 70–105)
GLUCOSE UR STRIP-MCNC: NEGATIVE MG/DL
HCG UR QL IA.RAPID: NEGATIVE
KETONES UR STRIP-SCNC: NEGATIVE MG/DL
LEUKOCYTE ESTERASE UR QL STRIP: NEGATIVE
MUCOUS THREADS #/AREA URNS HPF: ABNORMAL /HPF
NITRITE UR QL STRIP: NEGATIVE
PH UR STRIP: 6 PH UNITS
PROT UR QL STRIP: 30
RBC # UR STRIP: ABNORMAL /UL
RBC #/AREA URNS HPF: ABNORMAL /HPF
SARS-COV+SARS-COV-2 AG RESP QL IA.RAPID: NEGATIVE
SP GR UR STRIP: >=1.03
SQUAMOUS #/AREA URNS LPF: ABNORMAL /LPF
UROBILINOGEN UR STRIP-ACNC: 1 MG/DL
WBC #/AREA URNS HPF: ABNORMAL /HPF

## 2022-10-25 PROCEDURE — 99284 PR EMERGENCY DEPT VISIT,LEVEL IV: ICD-10-PCS | Mod: CS,,, | Performed by: NURSE PRACTITIONER

## 2022-10-25 PROCEDURE — 82962 GLUCOSE BLOOD TEST: CPT

## 2022-10-25 PROCEDURE — 96372 THER/PROPH/DIAG INJ SC/IM: CPT

## 2022-10-25 PROCEDURE — 81001 URINALYSIS AUTO W/SCOPE: CPT | Performed by: NURSE PRACTITIONER

## 2022-10-25 PROCEDURE — 99284 EMERGENCY DEPT VISIT MOD MDM: CPT

## 2022-10-25 PROCEDURE — 87428 SARSCOV & INF VIR A&B AG IA: CPT | Performed by: NURSE PRACTITIONER

## 2022-10-25 PROCEDURE — 63600175 PHARM REV CODE 636 W HCPCS: Performed by: NURSE PRACTITIONER

## 2022-10-25 PROCEDURE — 99284 EMERGENCY DEPT VISIT MOD MDM: CPT | Mod: CS,,, | Performed by: NURSE PRACTITIONER

## 2022-10-25 PROCEDURE — 81025 URINE PREGNANCY TEST: CPT | Performed by: NURSE PRACTITIONER

## 2022-10-25 RX ORDER — KETOROLAC TROMETHAMINE 30 MG/ML
30 INJECTION, SOLUTION INTRAMUSCULAR; INTRAVENOUS
Status: COMPLETED | OUTPATIENT
Start: 2022-10-25 | End: 2022-10-25

## 2022-10-25 RX ORDER — MEDROXYPROGESTERONE ACETATE 150 MG/ML
150 INJECTION, SUSPENSION INTRAMUSCULAR
COMMUNITY
End: 2023-10-24

## 2022-10-25 RX ORDER — PHENAZOPYRIDINE HYDROCHLORIDE 200 MG/1
200 TABLET, FILM COATED ORAL 3 TIMES DAILY
Qty: 6 TABLET | Refills: 0 | Status: SHIPPED | OUTPATIENT
Start: 2022-10-25 | End: 2022-10-27

## 2022-10-25 RX ADMIN — KETOROLAC TROMETHAMINE 30 MG: 30 INJECTION, SOLUTION INTRAMUSCULAR at 07:10

## 2022-10-25 NOTE — DISCHARGE INSTRUCTIONS
Drink plenty of water. Take Pyridium as prescribed. If symptoms return after completing Pyridium or you develop fever, follow-up with Dr. Morrison

## 2022-10-25 NOTE — ED PROVIDER NOTES
Encounter Date: 10/25/2022       History     Chief Complaint   Patient presents with    Dysuria     Presented with c/o mid to lower back pain with diarrhea and freq urination that started last night. States chills but denies known fever. Notes cough that started last pm. Completed treatment for UTI with Bactrim DS yesterday. States feels no better. Denies n/v.    Review of patient's allergies indicates:  No Known Allergies  History reviewed. No pertinent past medical history.  Past Surgical History:   Procedure Laterality Date    BREAST SURGERY       History reviewed. No pertinent family history.  Social History     Tobacco Use    Smoking status: Some Days     Types: Cigars    Smokeless tobacco: Never   Substance Use Topics    Alcohol use: Never    Drug use: Never     Review of Systems   Constitutional:  Positive for activity change, appetite change, chills and fatigue.   HENT:  Positive for congestion.    Respiratory:  Positive for cough (nonprod).    Cardiovascular: Negative.    Gastrointestinal:  Positive for diarrhea. Negative for abdominal pain, nausea and vomiting.   Endocrine: Positive for polyuria.   Genitourinary:  Positive for dysuria, frequency and urgency. Negative for flank pain.   Musculoskeletal:  Positive for back pain and myalgias.   Skin: Negative.    Neurological: Negative.    Psychiatric/Behavioral: Negative.       Physical Exam     Initial Vitals [10/25/22 0637]   BP Pulse Resp Temp SpO2   120/71 105 18 98 °F (36.7 °C) 98 %      MAP       --         Physical Exam    Nursing note and vitals reviewed.  Constitutional: She appears well-developed and well-nourished. No distress.   HENT:   Head: Normocephalic.   Right Ear: External ear normal.   Left Ear: External ear normal.   Nose: Nose normal.   Mouth/Throat: Oropharynx is clear and moist.   Eyes: Conjunctivae and EOM are normal. Pupils are equal, round, and reactive to light.   Neck: Neck supple.   Normal range of motion.  Cardiovascular:  Normal  rate, regular rhythm, normal heart sounds and intact distal pulses.           Pulmonary/Chest: Breath sounds normal.   Abdominal: Abdomen is soft. Bowel sounds are normal. She exhibits no distension. There is abdominal tenderness (SP).   No right CVA tenderness.  No left CVA tenderness. There is no rebound and no guarding.   Musculoskeletal:         General: No tenderness. Normal range of motion.      Cervical back: Normal range of motion and neck supple.     Neurological: She is alert and oriented to person, place, and time. She has normal strength. No cranial nerve deficit. GCS score is 15. GCS eye subscore is 4. GCS verbal subscore is 5. GCS motor subscore is 6.   Skin: Skin is warm and dry. Capillary refill takes less than 2 seconds.   Psychiatric: She has a normal mood and affect. Her behavior is normal. Judgment and thought content normal.       Medical Screening Exam   See Full Note    ED Course   Procedures  Labs Reviewed   URINALYSIS, REFLEX TO URINE CULTURE - Abnormal; Notable for the following components:       Result Value    Protein, UA 30 (*)     Bilirubin, UA Small (*)     Blood, UA Small (*)     Specific Gravity, UA >=1.030 (*)     All other components within normal limits   URINALYSIS, MICROSCOPIC - Abnormal; Notable for the following components:    RBC, UA 3-5 (*)     Bacteria, UA Few (*)     Squamous Epithelial Cells, UA Few (*)     Mucus, UA Few (*)     All other components within normal limits   POCT GLUCOSE MONITORING CONTINUOUS - Abnormal; Notable for the following components:    POC Glucose 128 (*)     All other components within normal limits   HCG QUALITATIVE URINE - Normal   SARS-COV2 (COVID) W/ FLU ANTIGEN - Normal    Narrative:     Negative SARS-CoV results should not be used as the sole basis for treatment or patient management decisions; negative results should be considered in the context of a patient's recent exposures, history and the presene of clinical signs and symptoms  consistent with COVID-19.  Negative results should be treated as presumptive and confirmed by molecular assay, if necessary for patient management.          Imaging Results    None          Medications   ketorolac injection 30 mg (30 mg Intramuscular Given 10/25/22 0740)     Medical Decision Making:   ED Management:  Instructed pt that urine does not indicate infection today but mildly dehydrated. Pt states has been scared to drink with UTI last week. Explained that symptoms may be due to not drinking enough or not completely resolved UTI. Rx for Pyridium. Instructed to drink plenty of water with med. Denies n/v. COVID 19, flu are negative.                 Clinical Impression:   Final diagnoses:  [R30.0] Dysuria (Primary)      ED Disposition Condition    Discharge Stable          ED Prescriptions       Medication Sig Dispense Start Date End Date Auth. Provider    phenazopyridine (PYRIDIUM) 200 MG tablet Take 1 tablet (200 mg total) by mouth 3 (three) times daily. for 2 days 6 tablet 10/25/2022 10/27/2022 Marcela Wilkinson NP          Follow-up Information       Follow up With Specialties Details Why Contact Info    Ezra Morrison IV, DO Family Medicine In 2 days  [address] [place] [address]  [phone]               Marcela Wilkinson NP  10/25/22 8798       Marcela Wilkinson NP  10/25/22 2199

## 2022-10-25 NOTE — ED NOTES
PATIENT AMBULATORY TO ER 1 WITH C/O DIFFICULTY URINATING. PATIENT STATES THAT SHE SAW DR KENNY AT CLINIC ON 10/19 AND WAS TOLD THAT SHE HAD A UTI AND WAS GIVEN BACTRIM TO TAKE AND TOOK THE LAST ONE YESTERDAY. PATIENT C/O LOW BACK PAIN AND CHILLS.

## 2022-12-19 ENCOUNTER — OFFICE VISIT (OUTPATIENT)
Dept: FAMILY MEDICINE | Facility: CLINIC | Age: 37
End: 2022-12-19
Payer: MEDICAID

## 2022-12-19 VITALS
HEIGHT: 63 IN | HEART RATE: 91 BPM | DIASTOLIC BLOOD PRESSURE: 66 MMHG | BODY MASS INDEX: 23.04 KG/M2 | WEIGHT: 130 LBS | SYSTOLIC BLOOD PRESSURE: 103 MMHG

## 2022-12-19 DIAGNOSIS — M25.511 PAIN OF BOTH SHOULDER JOINTS: Primary | ICD-10-CM

## 2022-12-19 DIAGNOSIS — F32.9 REACTIVE DEPRESSION: ICD-10-CM

## 2022-12-19 DIAGNOSIS — M25.512 PAIN OF BOTH SHOULDER JOINTS: Primary | ICD-10-CM

## 2022-12-19 DIAGNOSIS — F41.9 ANXIETY: ICD-10-CM

## 2022-12-19 PROCEDURE — 3078F PR MOST RECENT DIASTOLIC BLOOD PRESSURE < 80 MM HG: ICD-10-PCS | Mod: CPTII,,, | Performed by: FAMILY MEDICINE

## 2022-12-19 PROCEDURE — 1160F PR REVIEW ALL MEDS BY PRESCRIBER/CLIN PHARMACIST DOCUMENTED: ICD-10-PCS | Mod: CPTII,,, | Performed by: FAMILY MEDICINE

## 2022-12-19 PROCEDURE — 99213 OFFICE O/P EST LOW 20 MIN: CPT | Mod: ,,, | Performed by: FAMILY MEDICINE

## 2022-12-19 PROCEDURE — 1159F PR MEDICATION LIST DOCUMENTED IN MEDICAL RECORD: ICD-10-PCS | Mod: CPTII,,, | Performed by: FAMILY MEDICINE

## 2022-12-19 PROCEDURE — 1159F MED LIST DOCD IN RCRD: CPT | Mod: CPTII,,, | Performed by: FAMILY MEDICINE

## 2022-12-19 PROCEDURE — 3074F PR MOST RECENT SYSTOLIC BLOOD PRESSURE < 130 MM HG: ICD-10-PCS | Mod: CPTII,,, | Performed by: FAMILY MEDICINE

## 2022-12-19 PROCEDURE — 99213 PR OFFICE/OUTPT VISIT, EST, LEVL III, 20-29 MIN: ICD-10-PCS | Mod: ,,, | Performed by: FAMILY MEDICINE

## 2022-12-19 PROCEDURE — 3008F PR BODY MASS INDEX (BMI) DOCUMENTED: ICD-10-PCS | Mod: CPTII,,, | Performed by: FAMILY MEDICINE

## 2022-12-19 PROCEDURE — 3008F BODY MASS INDEX DOCD: CPT | Mod: CPTII,,, | Performed by: FAMILY MEDICINE

## 2022-12-19 PROCEDURE — 3078F DIAST BP <80 MM HG: CPT | Mod: CPTII,,, | Performed by: FAMILY MEDICINE

## 2022-12-19 PROCEDURE — 1160F RVW MEDS BY RX/DR IN RCRD: CPT | Mod: CPTII,,, | Performed by: FAMILY MEDICINE

## 2022-12-19 PROCEDURE — 3074F SYST BP LT 130 MM HG: CPT | Mod: CPTII,,, | Performed by: FAMILY MEDICINE

## 2022-12-19 RX ORDER — SERTRALINE HYDROCHLORIDE 100 MG/1
100 TABLET, FILM COATED ORAL DAILY
Qty: 30 TABLET | Refills: 3 | Status: SHIPPED | OUTPATIENT
Start: 2022-12-19 | End: 2023-03-23

## 2022-12-19 RX ORDER — IBUPROFEN 800 MG/1
800 TABLET ORAL 3 TIMES DAILY
Qty: 30 TABLET | Refills: 0 | Status: SHIPPED | OUTPATIENT
Start: 2022-12-19 | End: 2023-09-06 | Stop reason: SDUPTHER

## 2022-12-19 RX ORDER — CLONAZEPAM 1 MG/1
1 TABLET ORAL DAILY
Qty: 30 TABLET | Refills: 2 | Status: SHIPPED | OUTPATIENT
Start: 2022-12-19 | End: 2023-03-23 | Stop reason: SDUPTHER

## 2022-12-19 NOTE — PROGRESS NOTES
Ezra Morrison IV, DO  [facility]  [address] [place] [address]  Phone: [phone]      Subjective     Name: Windy Alcocer   Sex: female  YOB: 1985 (37 y.o.)  MRN: [id number]  Visit Date: 12/19/2022   Chief Complaint: Anxiety and Medication Refill (Also would like ibuprofen)        HISTORY OF PRESENT ILLNESS:    Patient presents to clinic with a chief complaint of worsening depression and anxiety.  She started a new job recently in [place].  The drive and the responsibilities have objectively increased both her anxiety and her depression.  She is not eating much currently.  The decision was made after discussion with the patient to increased her Zoloft to 100 mg p.o. q.d. and to increase her Klonopin from 0.5 mg to 1 mg p.o. q.d..  Patient will continue to monitor symptoms and update us as needed.  Patient's only other complaint in the office today is of shoulder pain from the new job.  She states she used to take ibuprofen 800 mg for this and is asking if I can add it back.  We will be prescribing that in the clinic today as well.  Patient will have normal follow-up in 3 months.      This document was dictated using [employer] fluency direct.  It is subject to dictation errors.    PAST MEDICAL HISTORY:  Significant Diagnoses - Patient  has a past medical history of Anxiety and Depression.  Medications - Patient has a current medication list which includes the following long-term medication(s): clonazepam and sertraline.   Allergies - Patient has No Known Allergies.  Surgeries - Patient  has a past surgical history that includes Breast surgery.  Family History - Patient family history is not on file.      SOCIAL HISTORY:  Tobacco - Patient  reports that she has been smoking cigars. She has never used smokeless tobacco.   Alcohol - Patient  reports no history of alcohol use.   Recreational Drugs - Patient  reports no history of drug use.       Review of Systems  "  Constitutional:  Negative for fatigue and fever.   HENT:  Negative for nasal congestion and sore throat.    Respiratory:  Negative for cough and shortness of breath.    Cardiovascular:  Negative for chest pain.   Gastrointestinal:  Negative for abdominal pain, nausea and vomiting.   Genitourinary:  Negative for dysuria.   Musculoskeletal:  Negative for myalgias.   Integumentary:  Negative for rash.   Neurological:  Negative for dizziness, weakness and headaches.        Past Medical History:   Diagnosis Date    Anxiety     Depression         Review of patient's allergies indicates:  No Known Allergies     Past Surgical History:   Procedure Laterality Date    BREAST SURGERY          History reviewed. No pertinent family history.    Current Outpatient Medications   Medication Instructions    clonazePAM (KLONOPIN) 1 mg, Oral, Daily    cyclobenzaprine (FLEXERIL) 10 mg, Oral, Nightly    ibuprofen (ADVIL,MOTRIN) 800 mg, Oral, 3 times daily    medroxyPROGESTERone (DEPO-PROVERA) 150 mg, Intramuscular, Every 3 months    sertraline (ZOLOFT) 100 mg, Oral, Daily        Objective     /66   Pulse 91   Ht 5' 3" (1.6 m)   Wt 59 kg (130 lb)   LMP  (LMP Unknown)   BMI 23.03 kg/m²     Physical Exam  Constitutional:       General: She is not in acute distress.     Appearance: Normal appearance. She is not ill-appearing.   HENT:      Head: Normocephalic and atraumatic.      Right Ear: External ear normal.      Left Ear: External ear normal.   Eyes:      Extraocular Movements: Extraocular movements intact.      Conjunctiva/sclera: Conjunctivae normal.   Cardiovascular:      Rate and Rhythm: Normal rate.      Heart sounds: No murmur heard.  Pulmonary:      Effort: Pulmonary effort is normal.   Musculoskeletal:      Cervical back: Normal range of motion.   Skin:     General: Skin is warm and dry.      Coloration: Skin is not jaundiced.      Findings: No rash.   Neurological:      Mental Status: She is alert. "   Psychiatric:         Mood and Affect: Mood normal.        All recently obtained labs have been reviewed and discussed in detail with the patient.   Assessment     1. Pain of both shoulder joints    2. Anxiety    3. Reactive depression         Plan        Problem List Items Addressed This Visit    None  Visit Diagnoses       Pain of both shoulder joints    -  Primary    Relevant Medications    ibuprofen (ADVIL,MOTRIN) 800 MG tablet    Anxiety        Relevant Medications    clonazePAM (KLONOPIN) 1 MG tablet    Reactive depression        Relevant Medications    sertraline (ZOLOFT) 100 MG tablet            No follow-ups on file.    Patient advised that is symptoms worsen, they should call or report directly to local emergency department.    Ezra Morrison DO  [facility]

## 2023-01-02 ENCOUNTER — HOSPITAL ENCOUNTER (EMERGENCY)
Facility: HOSPITAL | Age: 38
Discharge: HOME | End: 2023-01-02
Payer: MEDICAID

## 2023-01-02 VITALS
TEMPERATURE: 98 F | HEIGHT: 62 IN | WEIGHT: 120 LBS | SYSTOLIC BLOOD PRESSURE: 113 MMHG | BODY MASS INDEX: 22.08 KG/M2 | RESPIRATION RATE: 16 BRPM | OXYGEN SATURATION: 99 % | HEART RATE: 86 BPM | DIASTOLIC BLOOD PRESSURE: 77 MMHG

## 2023-01-02 DIAGNOSIS — U07.1 COVID-19 VIRUS INFECTION: Primary | ICD-10-CM

## 2023-01-02 LAB
FLUAV AG UPPER RESP QL IA.RAPID: NEGATIVE
FLUBV AG UPPER RESP QL IA.RAPID: NEGATIVE
SARS-COV+SARS-COV-2 AG RESP QL IA.RAPID: POSITIVE

## 2023-01-02 PROCEDURE — 99283 EMERGENCY DEPT VISIT LOW MDM: CPT | Mod: ,,, | Performed by: NURSE PRACTITIONER

## 2023-01-02 PROCEDURE — 99282 EMERGENCY DEPT VISIT SF MDM: CPT

## 2023-01-02 PROCEDURE — 99283 PR EMERGENCY DEPT VISIT,LEVEL III: ICD-10-PCS | Mod: ,,, | Performed by: NURSE PRACTITIONER

## 2023-01-02 PROCEDURE — 87428 SARSCOV & INF VIR A&B AG IA: CPT | Performed by: NURSE PRACTITIONER

## 2023-01-02 NOTE — ED PROVIDER NOTES
Encounter Date: 1/2/2023       History     Chief Complaint   Patient presents with    COVID-19 Concerns     Patient reports she was on a road trip yesterday with someone who was diagnosed with Covid and is concerned she may become sick.     Presented with c/o cough that is nonprod, nasal congestion and drng, and headache since 12/29 or 30. Denies fever, wheezing, dyspnea or chest pain, n/v/d or abd pain. Denies significant PMH.    Review of patient's allergies indicates:  No Known Allergies  Past Medical History:   Diagnosis Date    Anxiety     Depression      Past Surgical History:   Procedure Laterality Date    BREAST SURGERY       History reviewed. No pertinent family history.  Social History     Tobacco Use    Smoking status: Some Days     Types: Cigars    Smokeless tobacco: Never   Substance Use Topics    Alcohol use: Never    Drug use: Never     Review of Systems   Constitutional:  Negative for activity change, appetite change and fever.   HENT:  Positive for congestion, postnasal drip, rhinorrhea and sinus pressure. Negative for ear pain and sore throat.    Eyes: Negative.    Respiratory:  Positive for cough. Negative for chest tightness, shortness of breath and wheezing.    Cardiovascular:  Negative for chest pain.   Gastrointestinal:  Negative for abdominal pain, diarrhea, nausea and vomiting.   Genitourinary: Negative.    Musculoskeletal: Negative.    Neurological:  Positive for headaches.   Psychiatric/Behavioral: Negative.       Physical Exam     Initial Vitals [01/02/23 1028]   BP Pulse Resp Temp SpO2   113/77 86 16 98.1 °F (36.7 °C) 99 %      MAP       --         Physical Exam    Nursing note and vitals reviewed.  Constitutional: She appears well-developed and well-nourished. No distress.   HENT:   Head: Normocephalic.   Right Ear: External ear normal.   Left Ear: External ear normal.   Nose: Nose normal.   Mouth/Throat: Oropharynx is clear and moist.   Eyes: Conjunctivae and EOM are normal. Pupils are  equal, round, and reactive to light.   Neck: Neck supple.   Normal range of motion.  Cardiovascular:  Normal rate, regular rhythm, normal heart sounds and intact distal pulses.           No murmur heard.  Pulmonary/Chest: Breath sounds normal. No respiratory distress. She has no wheezes. She has no rhonchi.   Abdominal: Abdomen is soft. Bowel sounds are normal.   Musculoskeletal:         General: No tenderness or edema. Normal range of motion.      Cervical back: Normal range of motion and neck supple.     Lymphadenopathy:     She has no cervical adenopathy.   Neurological: She is alert and oriented to person, place, and time.   Skin: Skin is warm and dry. Capillary refill takes less than 2 seconds.   Psychiatric: She has a normal mood and affect. Her behavior is normal. Judgment and thought content normal.       Medical Screening Exam   See Full Note    ED Course   Procedures  Labs Reviewed   SARS-COV2 (COVID) W/ FLU ANTIGEN - Abnormal; Notable for the following components:       Result Value    COVID-19 Ag Positive (*)     All other components within normal limits    Narrative:     Positive SARS-CoV antigen results indicate the presence of viral antigens; correlation with patient history and presence of clinical signs & symptoms consistent with COVID-19 are necessary to determine infection status.          Imaging Results    None          Medications - No data to display  Medical Decision Making:   ED Management:  COVID 19 positive. No significant PMH or risk factors for severe disease. Instructed on home care, quarantine, and follow-up.                 Clinical Impression:   Final diagnoses:  [U07.1] COVID-19 virus infection (Primary)        ED Disposition Condition    Discharge Stable          ED Prescriptions    None       Follow-up Information    None          Marcela Wilkinson NP  01/02/23 6918

## 2023-01-02 NOTE — DISCHARGE INSTRUCTIONS
Take Vitamin C 1000mg daily, Vitamin D3 1000units daily, Zinc 25-50 mg daily, aspirin 325 mg daily, and famotidine 20mg twice a day. Drink plenty of liquids. Stay quarantined for 5 days from today. If no fever after the 5 days, you may continue your normal activities, but wear a mask. Otherwise, stay quarantined until no fever for 24 hours while not taking any Tylenol or ibuprofen up to 10 days. Return to the ED for trouble breathing or chest pain.

## 2023-01-05 ENCOUNTER — APPOINTMENT (OUTPATIENT)
Dept: LAB | Facility: CLINIC | Age: 38
End: 2023-01-05
Payer: MEDICAID

## 2023-01-05 DIAGNOSIS — Z20.828 VIRAL DISEASE EXPOSURE: Primary | ICD-10-CM

## 2023-01-05 LAB
CTP QC/QA: YES
SARS-COV-2 AG RESP QL IA.RAPID: POSITIVE

## 2023-01-05 PROCEDURE — 87426 SARSCOV CORONAVIRUS AG IA: CPT | Mod: RHCUB | Performed by: FAMILY MEDICINE

## 2023-01-20 ENCOUNTER — HOSPITAL ENCOUNTER (EMERGENCY)
Facility: HOSPITAL | Age: 38
Discharge: HOME | End: 2023-01-20
Payer: MEDICAID

## 2023-01-20 VITALS
SYSTOLIC BLOOD PRESSURE: 103 MMHG | OXYGEN SATURATION: 99 % | BODY MASS INDEX: 23.03 KG/M2 | HEIGHT: 61 IN | DIASTOLIC BLOOD PRESSURE: 60 MMHG | TEMPERATURE: 98 F | RESPIRATION RATE: 18 BRPM | HEART RATE: 63 BPM | WEIGHT: 122 LBS

## 2023-01-20 DIAGNOSIS — R07.9 CHEST PAIN: Primary | ICD-10-CM

## 2023-01-20 DIAGNOSIS — F41.9 ANXIETY: ICD-10-CM

## 2023-01-20 LAB
ALBUMIN SERPL BCP-MCNC: 4 G/DL (ref 3.5–5)
ALBUMIN/GLOB SERPL: 1.4 {RATIO}
ALP SERPL-CCNC: 73 U/L (ref 37–98)
ALT SERPL W P-5'-P-CCNC: 20 U/L (ref 13–56)
AMPHET UR QL SCN: NEGATIVE
ANION GAP SERPL CALCULATED.3IONS-SCNC: 12 MMOL/L (ref 7–16)
ANISOCYTOSIS BLD QL SMEAR: ABNORMAL
AST SERPL W P-5'-P-CCNC: 15 U/L (ref 15–37)
BACTERIA #/AREA URNS HPF: ABNORMAL /HPF
BARBITURATES UR QL SCN: NEGATIVE
BASOPHILS # BLD AUTO: 0.02 K/UL (ref 0–0.2)
BASOPHILS NFR BLD AUTO: 0.3 % (ref 0–1)
BENZODIAZ METAB UR QL SCN: POSITIVE
BILIRUB SERPL-MCNC: 0.4 MG/DL (ref ?–1.2)
BILIRUB UR QL STRIP: ABNORMAL
BUN SERPL-MCNC: 16 MG/DL (ref 7–18)
BUN/CREAT SERPL: 22 (ref 6–20)
CALCIUM SERPL-MCNC: 8.3 MG/DL (ref 8.5–10.1)
CANNABINOIDS UR QL SCN: POSITIVE
CHLORIDE SERPL-SCNC: 107 MMOL/L (ref 98–107)
CLARITY UR: ABNORMAL
CO2 SERPL-SCNC: 29 MMOL/L (ref 21–32)
COCAINE UR QL SCN: NEGATIVE
COLOR UR: YELLOW
CREAT SERPL-MCNC: 0.72 MG/DL (ref 0.55–1.02)
D DIMER PPP FEU-MCNC: 0.35 ΜG/ML (ref 0–0.47)
DIFFERENTIAL METHOD BLD: ABNORMAL
EGFR (NO RACE VARIABLE) (RUSH/TITUS): 111 ML/MIN/1.73M²
EOSINOPHIL # BLD AUTO: 0.07 K/UL (ref 0–0.5)
EOSINOPHIL NFR BLD AUTO: 1.1 % (ref 1–4)
ERYTHROCYTE [DISTWIDTH] IN BLOOD BY AUTOMATED COUNT: 15.4 % (ref 11.5–14.5)
GLOBULIN SER-MCNC: 2.8 G/DL (ref 2–4)
GLUCOSE SERPL-MCNC: 97 MG/DL (ref 74–106)
GLUCOSE UR STRIP-MCNC: NEGATIVE MG/DL
HCG UR QL IA.RAPID: NEGATIVE
HCT VFR BLD AUTO: 31.5 % (ref 38–47)
HGB BLD-MCNC: 11 G/DL (ref 12–16)
KETONES UR STRIP-SCNC: NEGATIVE MG/DL
LEUKOCYTE ESTERASE UR QL STRIP: NEGATIVE
LYMPHOCYTES # BLD AUTO: 3.75 K/UL (ref 1–4.8)
LYMPHOCYTES NFR BLD AUTO: 56.5 % (ref 27–41)
MAGNESIUM SERPL-MCNC: 2.1 MG/DL (ref 1.7–2.3)
MCH RBC QN AUTO: 25.2 PG (ref 27–31)
MCHC RBC AUTO-ENTMCNC: 34.9 G/DL (ref 32–36)
MCV RBC AUTO: 72.1 FL (ref 80–96)
MICROCYTES BLD QL SMEAR: ABNORMAL
MONOCYTES # BLD AUTO: 0.55 K/UL (ref 0–0.8)
MONOCYTES NFR BLD AUTO: 8.3 % (ref 2–6)
MPC BLD CALC-MCNC: ABNORMAL G/DL
MUCOUS THREADS #/AREA URNS HPF: ABNORMAL /HPF
NEUTROPHILS # BLD AUTO: 2.25 K/UL (ref 1.8–7.7)
NEUTROPHILS NFR BLD AUTO: 33.8 % (ref 53–65)
NITRITE UR QL STRIP: POSITIVE
NT-PROBNP SERPL-MCNC: 40 PG/ML (ref 1–125)
OPIATES UR QL SCN: POSITIVE
OVALOCYTES BLD QL SMEAR: ABNORMAL
PCP UR QL SCN: NEGATIVE
PH UR STRIP: 6 PH UNITS
PLATELET # BLD AUTO: 148 K/UL (ref 150–400)
PLATELET MORPHOLOGY: ABNORMAL
POTASSIUM SERPL-SCNC: 3.9 MMOL/L (ref 3.5–5.1)
PROT SERPL-MCNC: 6.8 G/DL (ref 6.4–8.2)
PROT UR QL STRIP: ABNORMAL
RBC # BLD AUTO: 4.37 M/UL (ref 4.2–5.4)
RBC # UR STRIP: ABNORMAL /UL
RBC #/AREA URNS HPF: ABNORMAL /HPF
SODIUM SERPL-SCNC: 144 MMOL/L (ref 136–145)
SP GR UR STRIP: >=1.03
SQUAMOUS #/AREA URNS LPF: ABNORMAL /LPF
TARGETS BLD QL SMEAR: ABNORMAL
TROPONIN I SERPL HS-MCNC: <4 PG/ML
UROBILINOGEN UR STRIP-ACNC: 1 MG/DL
WBC # BLD AUTO: 6.64 K/UL (ref 4.5–11)
WBC #/AREA URNS HPF: ABNORMAL /HPF
YEAST #/AREA URNS HPF: ABNORMAL /HPF

## 2023-01-20 PROCEDURE — 83735 ASSAY OF MAGNESIUM: CPT | Performed by: NURSE PRACTITIONER

## 2023-01-20 PROCEDURE — 80307 DRUG TEST PRSMV CHEM ANLYZR: CPT | Performed by: NURSE PRACTITIONER

## 2023-01-20 PROCEDURE — 81003 URINALYSIS AUTO W/O SCOPE: CPT | Mod: 59 | Performed by: NURSE PRACTITIONER

## 2023-01-20 PROCEDURE — 84484 ASSAY OF TROPONIN QUANT: CPT | Performed by: NURSE PRACTITIONER

## 2023-01-20 PROCEDURE — 99284 EMERGENCY DEPT VISIT MOD MDM: CPT | Mod: ,,, | Performed by: NURSE PRACTITIONER

## 2023-01-20 PROCEDURE — 83880 ASSAY OF NATRIURETIC PEPTIDE: CPT | Performed by: NURSE PRACTITIONER

## 2023-01-20 PROCEDURE — 85025 COMPLETE CBC W/AUTO DIFF WBC: CPT | Performed by: NURSE PRACTITIONER

## 2023-01-20 PROCEDURE — 81025 URINE PREGNANCY TEST: CPT | Performed by: NURSE PRACTITIONER

## 2023-01-20 PROCEDURE — 80053 COMPREHEN METABOLIC PANEL: CPT | Performed by: NURSE PRACTITIONER

## 2023-01-20 PROCEDURE — 93005 ELECTROCARDIOGRAM TRACING: CPT

## 2023-01-20 PROCEDURE — 93010 EKG 12-LEAD: ICD-10-PCS | Mod: ,,, | Performed by: INTERNAL MEDICINE

## 2023-01-20 PROCEDURE — 87077 CULTURE AEROBIC IDENTIFY: CPT | Performed by: NURSE PRACTITIONER

## 2023-01-20 PROCEDURE — 93010 ELECTROCARDIOGRAM REPORT: CPT | Mod: ,,, | Performed by: INTERNAL MEDICINE

## 2023-01-20 PROCEDURE — 85379 FIBRIN DEGRADATION QUANT: CPT | Performed by: NURSE PRACTITIONER

## 2023-01-20 PROCEDURE — 99284 PR EMERGENCY DEPT VISIT,LEVEL IV: ICD-10-PCS | Mod: ,,, | Performed by: NURSE PRACTITIONER

## 2023-01-20 PROCEDURE — 99285 EMERGENCY DEPT VISIT HI MDM: CPT | Mod: 25

## 2023-01-20 PROCEDURE — 87086 URINE CULTURE/COLONY COUNT: CPT | Performed by: NURSE PRACTITIONER

## 2023-01-21 NOTE — DISCHARGE INSTRUCTIONS
Take your clonazepam as prescribed. Follow-up with your PCP in 3 days for recheck. Return to the ED for new or worsening symptoms.

## 2023-01-21 NOTE — ED PROVIDER NOTES
"Encounter Date: 1/20/2023       History     Chief Complaint   Patient presents with    Chest Pain     C/O CHEST PAIN WITH ONSET ERNA THREE WEEKS AGO. PT WAS DX WITH COVID ON 2 JAN 2023. PT ALSO JUST REFILLED HER ANXIETY MEDS 5 DAYS AGO     Presented with c/o chest pain x 3 weeks.  pain is off and on that presents as pressure at times and sharp pain at times. States pain with deep breath. Denies dyspnea, nausea/vomiting, or diaphoresis.   feels like anxiety. Reports a PMH of anxiety for which she takes clonazepam.  was diagnosed with COVID 19 on 1/2 and had mild symptoms.  has 8 children and has been under increased stress lately. She notes that her mother was hospitalized in ICU due to COVID 19 and has now gone to live in [place] with her brother which has caused more stress for her. Also her "old man" is off working at present. She reports that PTA she and her friend were out on the porch smoking a blunt when she decided to come to the ED.    Review of patient's allergies indicates:  No Known Allergies  Past Medical History:   Diagnosis Date    Anxiety     Depression      Past Surgical History:   Procedure Laterality Date    BREAST SURGERY       History reviewed. No pertinent family history.  Social History     Tobacco Use    Smoking status: Every Day     Types: Cigars    Smokeless tobacco: Never   Substance Use Topics    Alcohol use: Never    Drug use: Yes     Frequency: 1.0 times per week     Types: Marijuana     Comment: TODAY     Review of Systems   Constitutional:  Negative for activity change, appetite change and fever.   HENT: Negative.     Eyes: Negative.    Respiratory:  Negative for cough, chest tightness, shortness of breath and wheezing.    Cardiovascular:  Positive for chest pain.   Gastrointestinal:  Negative for abdominal pain, diarrhea, nausea and vomiting.   Genitourinary: Negative.    Musculoskeletal: Negative.    Skin: Negative.    Neurological: Negative.  Negative " for dizziness, weakness and headaches.   Psychiatric/Behavioral: Negative.       Physical Exam     Initial Vitals [01/20/23 2140]   BP Pulse Resp Temp SpO2   113/82 81 20 98 °F (36.7 °C) 99 %      MAP       --         Physical Exam    Vitals reviewed.  Constitutional: She appears well-developed and well-nourished. No distress.   HENT:   Head: Normocephalic.   Right Ear: External ear normal.   Left Ear: External ear normal.   Nose: Nose normal.   Mouth/Throat: Oropharynx is clear and moist.   Eyes: Conjunctivae and EOM are normal. Pupils are equal, round, and reactive to light.   Neck: Neck supple.   Normal range of motion.  Cardiovascular:  Normal rate, regular rhythm, normal heart sounds and intact distal pulses.           No murmur heard.  Pulmonary/Chest: Breath sounds normal. No respiratory distress. She has no wheezes. She has no rhonchi. She has no rales. She exhibits no tenderness.   Abdominal: Abdomen is soft. Bowel sounds are normal.   Musculoskeletal:         General: Normal range of motion.      Cervical back: Normal range of motion and neck supple.     Neurological: She is alert and oriented to person, place, and time. She has normal strength. No cranial nerve deficit. GCS score is 15. GCS eye subscore is 4. GCS verbal subscore is 5. GCS motor subscore is 6.   Skin: Skin is warm and dry. Capillary refill takes less than 2 seconds.   Psychiatric: She has a normal mood and affect. Her behavior is normal. Judgment and thought content normal.       Medical Screening Exam   See Full Note    ED Course   Procedures  Labs Reviewed   COMPREHENSIVE METABOLIC PANEL - Abnormal; Notable for the following components:       Result Value    BUN/Creatinine Ratio 22 (*)     Calcium 8.3 (*)     All other components within normal limits   DRUG SCREEN, URINE (BEAKER) - Abnormal; Notable for the following components:    Benzodiazepine, Urine Positive (*)     Opiates, Urine Positive (*)     Cannabinoid, Urine Positive (*)      All other components within normal limits    Narrative:     The results of screening tests should be considered presumptive. Confirmatory testing is available upon request.    Cutoff Points:  PCP:         25ng/mL  AMPH:        500ng/mL  KERI:        200ng/mL  BIPIN:        200ng/mL  THC:         50ng/mL  MARINA:         300ng/mL  OPI:         2000ng/mL   URINALYSIS, REFLEX TO URINE CULTURE - Abnormal; Notable for the following components:    Nitrites, UA Positive (*)     Protein, UA Trace (*)     Bilirubin, UA Small (*)     Blood, UA Large (*)     Specific Gravity, UA >=1.030 (*)     All other components within normal limits   CBC WITH DIFFERENTIAL - Abnormal; Notable for the following components:    Hemoglobin 11.0 (*)     Hematocrit 31.5 (*)     MCV 72.1 (*)     MCH 25.2 (*)     RDW 15.4 (*)     Platelet Count 148 (*)     Neutrophils % 33.8 (*)     Lymphocytes % 56.5 (*)     Monocytes % 8.3 (*)     All other components within normal limits   URINALYSIS, MICROSCOPIC - Abnormal; Notable for the following components:    RBC, UA 5-10 (*)     Bacteria, UA Many (*)     Yeast, UA Rare (*)     Squamous Epithelial Cells, UA Moderate (*)     Mucus, UA Moderate (*)     All other components within normal limits   CBC MORPHOLOGY - Abnormal; Notable for the following components:    Platelet Morphology Decreased (*)     All other components within normal limits   TROPONIN I - Normal   NT-PRO NATRIURETIC PEPTIDE - Normal   D DIMER, QUANTITATIVE - Normal   MAGNESIUM - Normal   HCG QUALITATIVE URINE - Normal   CULTURE, URINE   CBC W/ AUTO DIFFERENTIAL    Narrative:     The following orders were created for panel order CBC auto differential.  Procedure                               Abnormality         Status                     ---------                               -----------         ------                     CBC with Differential[[id number]]        Abnormal            Final result                 Please view results for these tests  on the individual orders.     EKG Readings: (Independently Interpreted)   Initial Reading: No STEMI. Rhythm: Normal Sinus Rhythm. Heart Rate: 95.   Reviewed at 2130. No previous available for comparison.   ECG Results              EKG 12-lead (In process)  Result time 01/20/23 21:42:54      In process by Interface, Lab In [facility] (01/20/23 21:42:54)                   Narrative:    Test Reason : R07.9,    Vent. Rate : 095 BPM     Atrial Rate : 095 BPM     P-R Int : 206 ms          QRS Dur : 078 ms      QT Int : 352 ms       P-R-T Axes : 068 014 038 degrees     QTc Int : 442 ms    Normal sinus rhythm  Right atrial enlargement  Anterior infarct ,age undetermined  Abnormal ECG  No previous ECGs available    Referred By:             Confirmed By:                                   Imaging Results              X-Ray Chest AP Portable (In process)                      Medications - No data to display  Medical Decision Making:   ED Management:  Ekg shows NSR with no STEMI. Troponin < 4.0. BNP 40. D-dimer 0.35. WBC 6640 with H&H 11.0 and 31.5, platelet 992791, BUN 16 and creatinine 0.72. UDDS positive for cannab, opiates, and benzo. Preg test negative. CXR clear with no sign of pneumonia. UA shows many bacteria and nitrite positive but with 0-5 WBC on microscopic. Specimen is clean catch and likely contaminated. Denies dysuria, frequency, hesitancy, or lower back or abd pain.     Explained negative findings for cardiac ischemia or PE. O2 sat 98-99% on RA. HR has remained in the 80s with RSR. /76. Pt dozed while in the ED and upon awakening states that she feels better. Discharged home to follow-up with PCP next week. Instructed to return to the ED for worsening symptoms.                   Clinical Impression:   Final diagnoses:  [R07.9] Chest pain (Primary)  [F41.9] Anxiety        ED Disposition Condition    Discharge Stable          ED Prescriptions    None       Follow-up Information       Follow up With Specialties  Details Why Contact Info    Ezra Morrison IV, DO Family Medicine In 3 days  [address] [facility] [id number] [phone]      [facility] - Emergency Department Emergency Medicine  If symptoms worsen [address] [facility] [id number] [phone]             Marcela Wilkinson NP  01/20/23 [id number]

## 2023-01-23 LAB — UA COMPLETE W REFLEX CULTURE PNL UR: ABNORMAL

## 2023-02-08 ENCOUNTER — HOSPITAL ENCOUNTER (EMERGENCY)
Facility: HOSPITAL | Age: 38
Discharge: HOME | End: 2023-02-08
Payer: MEDICAID

## 2023-02-08 VITALS
DIASTOLIC BLOOD PRESSURE: 78 MMHG | TEMPERATURE: 98 F | OXYGEN SATURATION: 98 % | BODY MASS INDEX: 21.35 KG/M2 | HEART RATE: 97 BPM | WEIGHT: 116 LBS | SYSTOLIC BLOOD PRESSURE: 117 MMHG | RESPIRATION RATE: 18 BRPM | HEIGHT: 62 IN

## 2023-02-08 DIAGNOSIS — R05.3 POST-COVID CHRONIC COUGH: Primary | ICD-10-CM

## 2023-02-08 DIAGNOSIS — U09.9 POST-COVID CHRONIC COUGH: Primary | ICD-10-CM

## 2023-02-08 DIAGNOSIS — R05.9 COUGH: ICD-10-CM

## 2023-02-08 PROCEDURE — 99283 EMERGENCY DEPT VISIT LOW MDM: CPT | Mod: ,,, | Performed by: NURSE PRACTITIONER

## 2023-02-08 PROCEDURE — 99283 EMERGENCY DEPT VISIT LOW MDM: CPT | Mod: 25

## 2023-02-08 PROCEDURE — 99283 PR EMERGENCY DEPT VISIT,LEVEL III: ICD-10-PCS | Mod: ,,, | Performed by: NURSE PRACTITIONER

## 2023-02-08 RX ORDER — GUAIFENESIN AND DEXTROMETHORPHAN HYDROBROMIDE 600; 30 MG/1; MG/1
1 TABLET, EXTENDED RELEASE ORAL 2 TIMES DAILY PRN
Qty: 14 TABLET | Refills: 0 | OUTPATIENT
Start: 2023-02-08 | End: 2023-12-12

## 2023-02-09 NOTE — ED TRIAGE NOTES
Patient comes from home by POV with a report of congestion and cough since having Covid the beginning of Jan this year, reports she took Tylenol PM at 1730 today

## 2023-02-09 NOTE — ED PROVIDER NOTES
Encounter Date: 2/8/2023       History     Chief Complaint   Patient presents with    Cough    Nasal Congestion     Started with Covid in Jan     Pt reports hacking cough for 1 month after being diagnosed with COVID early January.  Denies SOB and fever.     The history is provided by the patient.   Cough  This is a new problem. The current episode started several weeks ago. The problem occurs every few minutes. The problem has been unchanged. The cough is Non-productive. There has been no fever. Pertinent negatives include no chest pain, no chills, no sweats, no weight loss, no ear congestion, no ear pain, no headaches, no rhinorrhea, no sore throat, no myalgias, no shortness of breath, no wheezing and no eye redness. Her past medical history does not include bronchitis, pneumonia, bronchiectasis, COPD, emphysema or asthma.   Review of patient's allergies indicates:  No Known Allergies  Past Medical History:   Diagnosis Date    Anxiety     Depression      Past Surgical History:   Procedure Laterality Date    BREAST SURGERY       History reviewed. No pertinent family history.  Social History     Tobacco Use    Smoking status: Every Day     Types: Cigars    Smokeless tobacco: Never   Substance Use Topics    Alcohol use: Never    Drug use: Yes     Frequency: 2.0 times per week     Types: Marijuana     Comment: last smoked 4 days ago     Review of Systems   Constitutional:  Negative for chills, fever and weight loss.   HENT:  Negative for ear pain, rhinorrhea and sore throat.    Eyes:  Negative for redness.   Respiratory:  Positive for cough. Negative for shortness of breath and wheezing.    Cardiovascular:  Negative for chest pain.   Gastrointestinal:  Negative for nausea.   Genitourinary:  Negative for dysuria.   Musculoskeletal:  Negative for back pain and myalgias.   Skin:  Negative for rash.   Neurological:  Negative for weakness and headaches.   Hematological:  Does not bruise/bleed easily.     Physical Exam      Initial Vitals [02/08/23 1817]   BP Pulse Resp Temp SpO2   109/77 102 20 98.1 °F (36.7 °C) 100 %      MAP       --         Physical Exam    Nursing note and vitals reviewed.  Constitutional: She appears well-developed and well-nourished. She is not diaphoretic. She is cooperative.  Non-toxic appearance. She does not have a sickly appearance. She does not appear ill. No distress. She is not intubated.   HENT:   Head: Normocephalic and atraumatic.   Eyes: Pupils are equal, round, and reactive to light.   Neck: Neck supple.   Cardiovascular:  Regular rhythm, normal heart sounds and intact distal pulses.   Tachycardia present.   Exam reveals no gallop and no friction rub.       No murmur heard.  Pulmonary/Chest: Effort normal and breath sounds normal. No accessory muscle usage. No apnea, no tachypnea and no bradypnea. She is not intubated. No respiratory distress. She has no decreased breath sounds. She has no wheezes. She has no rhonchi. She has no rales. She exhibits no tenderness.   Musculoskeletal:      Cervical back: Neck supple.     Neurological: She is alert and oriented to person, place, and time.   Skin: Skin is warm and dry. Capillary refill takes less than 2 seconds.   Psychiatric: She has a normal mood and affect.       Medical Screening Exam   See Full Note    ED Course   Procedures  Labs Reviewed - No data to display       Imaging Results              X-Ray Chest PA And Lateral (Final result)  Result time 02/08/23 19:08:56      Final result by Henri ePguero MD (02/08/23 19:08:56)                   Impression:      No acute findings.      Electronically signed by: Henri Peguero  Date:    02/08/2023  Time:    19:08               Narrative:    EXAMINATION:  XR CHEST PA AND LATERAL    CLINICAL HISTORY:  Cough, unspecified    TECHNIQUE:  PA and lateral views of the chest were performed.    COMPARISON:  01/20/2023    FINDINGS:  Heart size normal. Lungs clear. No pneumothorax or pleural effusion.                                        Medications - No data to display  Medical Decision Making:   Differential Diagnosis:   Post-covid PNA, residual cough s/p covid  Clinical Tests:   Radiological Study: Ordered and Reviewed  ED Management:  CXR normal.  Will place on cough med.                 Clinical Impression:   Final diagnoses:  [R05.9] Cough  [R05.3, U09.9] Post-COVID chronic cough (Primary)        ED Disposition Condition    Discharge Stable          ED Prescriptions       Medication Sig Dispense Start Date End Date Auth. Provider    dextromethorphan-guaiFENesin  mg (MUCINEX DM)  mg per 12 hr tablet Take 1 tablet by mouth 2 (two) times daily as needed (cough). 14 tablet 2/8/2023 -- HARDY Nunes          Follow-up Information       Follow up With Specialties Details Why Contact Info    Ezra Morrison IV, DO Family Medicine Schedule an appointment as soon as possible for a visit in 1 week As needed, If symptoms worsen [address] [facility] [place] [address]  [phone]               HARDY Nunes  02/08/23 1929

## 2023-02-14 NOTE — ADDENDUM NOTE
Encounter addended by: Jeni Winchester on: 2/14/2023 8:32 AM   Actions taken: SmartForm saved, Flowsheet accepted

## 2023-03-23 ENCOUNTER — OFFICE VISIT (OUTPATIENT)
Dept: FAMILY MEDICINE | Facility: CLINIC | Age: 38
End: 2023-03-23
Payer: MEDICAID

## 2023-03-23 VITALS
DIASTOLIC BLOOD PRESSURE: 69 MMHG | HEIGHT: 62 IN | WEIGHT: 127 LBS | SYSTOLIC BLOOD PRESSURE: 103 MMHG | BODY MASS INDEX: 23.37 KG/M2 | HEART RATE: 105 BPM

## 2023-03-23 DIAGNOSIS — F41.9 ANXIETY: ICD-10-CM

## 2023-03-23 DIAGNOSIS — Z79.899 HIGH RISK MEDICATION USE: Primary | ICD-10-CM

## 2023-03-23 DIAGNOSIS — F32.9 REACTIVE DEPRESSION: ICD-10-CM

## 2023-03-23 LAB
CTP QC/QA: YES
POC (AMP) AMPHETAMINE: NEGATIVE
POC (BAR) BARBITURATES: NEGATIVE
POC (BUP) BUPRENORPHINE: NEGATIVE
POC (BZO) BENZODIAZEPINES: ABNORMAL
POC (COC) COCAINE: NEGATIVE
POC (MDMA) METHYLENEDIOXYMETHAMPHETAMINE 3,4: NEGATIVE
POC (MET) METHAMPHETAMINE: NEGATIVE
POC (MOP) OPIATES: NEGATIVE
POC (MTD) METHADONE: NEGATIVE
POC (OXY) OXYCODONE: NEGATIVE
POC (PCP) PHENCYCLIDINE: NEGATIVE
POC (TCA) TRICYCLIC ANTIDEPRESSANTS: NEGATIVE
POC TEMPERATURE (URINE): 92
POC THC: NEGATIVE

## 2023-03-23 PROCEDURE — 1159F PR MEDICATION LIST DOCUMENTED IN MEDICAL RECORD: ICD-10-PCS | Mod: CPTII,,, | Performed by: FAMILY MEDICINE

## 2023-03-23 PROCEDURE — 3074F PR MOST RECENT SYSTOLIC BLOOD PRESSURE < 130 MM HG: ICD-10-PCS | Mod: CPTII,,, | Performed by: FAMILY MEDICINE

## 2023-03-23 PROCEDURE — 80305 DRUG TEST PRSMV DIR OPT OBS: CPT | Mod: RHCUB | Performed by: FAMILY MEDICINE

## 2023-03-23 PROCEDURE — 3008F BODY MASS INDEX DOCD: CPT | Mod: CPTII,,, | Performed by: FAMILY MEDICINE

## 2023-03-23 PROCEDURE — 1159F MED LIST DOCD IN RCRD: CPT | Mod: CPTII,,, | Performed by: FAMILY MEDICINE

## 2023-03-23 PROCEDURE — 3008F PR BODY MASS INDEX (BMI) DOCUMENTED: ICD-10-PCS | Mod: CPTII,,, | Performed by: FAMILY MEDICINE

## 2023-03-23 PROCEDURE — 3078F PR MOST RECENT DIASTOLIC BLOOD PRESSURE < 80 MM HG: ICD-10-PCS | Mod: CPTII,,, | Performed by: FAMILY MEDICINE

## 2023-03-23 PROCEDURE — 99214 PR OFFICE/OUTPT VISIT, EST, LEVL IV, 30-39 MIN: ICD-10-PCS | Mod: ,,, | Performed by: FAMILY MEDICINE

## 2023-03-23 PROCEDURE — 3074F SYST BP LT 130 MM HG: CPT | Mod: CPTII,,, | Performed by: FAMILY MEDICINE

## 2023-03-23 PROCEDURE — 99214 OFFICE O/P EST MOD 30 MIN: CPT | Mod: ,,, | Performed by: FAMILY MEDICINE

## 2023-03-23 PROCEDURE — 3078F DIAST BP <80 MM HG: CPT | Mod: CPTII,,, | Performed by: FAMILY MEDICINE

## 2023-03-23 RX ORDER — SERTRALINE HYDROCHLORIDE 100 MG/1
100 TABLET, FILM COATED ORAL DAILY
Qty: 30 TABLET | Refills: 3 | Status: CANCELLED | OUTPATIENT
Start: 2023-03-23 | End: 2023-07-21

## 2023-03-23 RX ORDER — CLONAZEPAM 1 MG/1
1 TABLET ORAL DAILY
Qty: 30 TABLET | Refills: 2 | Status: SHIPPED | OUTPATIENT
Start: 2023-03-23 | End: 2023-04-04

## 2023-03-23 RX ORDER — FLUOXETINE HYDROCHLORIDE 20 MG/1
20 CAPSULE ORAL DAILY
Qty: 30 CAPSULE | Refills: 3 | Status: SHIPPED | OUTPATIENT
Start: 2023-03-23 | End: 2023-06-20 | Stop reason: SDUPTHER

## 2023-03-23 NOTE — ASSESSMENT & PLAN NOTE
Patient was unable to tolerate Zoloft secondary to GI upset.  We will be changing to fluoxetine 20 mg p.o. q.d.

## 2023-03-23 NOTE — PROGRESS NOTES
"              Ezra Morrison IV, DO  [facility]  [address] [place] [address]  Phone: [phone]      Subjective     Name: Windy Alcocer   Sex: female  YOB: 1985 (37 y.o.)  MRN: [id number]  Visit Date: 03/23/2023   Chief Complaint: Medication Refill        HISTORY OF PRESENT ILLNESS:    Chief Complaint   Patient presents with    Medication Refill       HPI  See tests that keep you healthy and the problem oriented documentation below.    Tests to Keep You Healthy    Cervical Cancer Screening: Met on 8/23/2022  Tobacco Cessation: NO        Portions of this note may have been created with voice recognition software. Occasional "wrong-word" or "sound-a-like" substitutions may have occurred due to the inherent limitations of voice recognition software. Please, read the note carefully and recognize, using context, where substitutions have occurred.     PAST MEDICAL HISTORY:  Significant Diagnoses - Patient  has a past medical history of Anxiety and Depression.  Medications - Patient has a current medication list which includes the following long-term medication(s): clonazepam and fluoxetine.   Allergies - Patient has No Known Allergies.  Surgeries - Patient  has a past surgical history that includes Breast surgery.  Family History - Patient family history is not on file.      SOCIAL HISTORY:  Tobacco - Patient  reports that she has been smoking cigars. She has never used smokeless tobacco.   Alcohol - Patient  reports no history of alcohol use.   Recreational Drugs - Patient  reports current drug use. Frequency: 2.00 times per week. Drug: Marijuana.       Review of Systems   All other systems reviewed and are negative.       Past Medical History:   Diagnosis Date    Anxiety     Depression         Review of patient's allergies indicates:  No Known Allergies     Past Surgical History:   Procedure Laterality Date    BREAST SURGERY          History reviewed. No pertinent family " "history.    Current Outpatient Medications   Medication Instructions    clonazePAM (KLONOPIN) 1 mg, Oral, Daily    cyclobenzaprine (FLEXERIL) 10 mg, Oral, Nightly    dextromethorphan-guaiFENesin  mg (MUCINEX DM)  mg per 12 hr tablet 1 tablet, Oral, 2 times daily PRN    FLUoxetine 20 mg, Oral, Daily    ibuprofen (ADVIL,MOTRIN) 800 mg, Oral, 3 times daily    medroxyPROGESTERone (DEPO-PROVERA) 150 mg, Intramuscular, Every 3 months        Objective     /69   Pulse 105   Ht 5' 2" (1.575 m)   Wt 57.6 kg (127 lb)   BMI 23.23 kg/m²     Physical Exam  Constitutional:       General: She is not in acute distress.     Appearance: Normal appearance. She is not ill-appearing.   HENT:      Head: Normocephalic and atraumatic.      Right Ear: External ear normal.      Left Ear: External ear normal.   Eyes:      Extraocular Movements: Extraocular movements intact.      Conjunctiva/sclera: Conjunctivae normal.   Cardiovascular:      Rate and Rhythm: Normal rate.      Heart sounds: No murmur heard.  Pulmonary:      Effort: Pulmonary effort is normal.   Musculoskeletal:      Cervical back: Normal range of motion.   Skin:     General: Skin is warm and dry.      Coloration: Skin is not jaundiced.      Findings: No rash.   Neurological:      Mental Status: She is alert.   Psychiatric:         Mood and Affect: Mood normal.        All recently obtained labs have been reviewed and discussed in detail with the patient.   Assessment     1. High risk medication use    2. Anxiety    3. Reactive depression         Plan        Problem List Items Addressed This Visit          Psychiatric    Anxiety     Unable to tolerate SSRI at this time.  Will continue clonazepam 1 mg p.o. q.d.         Relevant Medications    clonazePAM (KLONOPIN) 1 MG tablet    FLUoxetine 20 MG capsule    Reactive depression     Patient was unable to tolerate Zoloft secondary to GI upset.  We will be changing to fluoxetine 20 mg p.o. q.d.         " Relevant Medications    FLUoxetine 20 MG capsule     Other Visit Diagnoses       High risk medication use    -  Primary    Relevant Orders    POCT Urine Drug Screen Presump (Completed)            No follow-ups on file.    Patient advised that is symptoms worsen, they should call or report directly to local emergency department.    Ezra Morrison DO  [facility]

## 2023-04-04 ENCOUNTER — TELEPHONE (OUTPATIENT)
Dept: FAMILY MEDICINE | Facility: CLINIC | Age: 38
End: 2023-04-04
Payer: MEDICAID

## 2023-04-17 DIAGNOSIS — F41.9 ANXIETY: Primary | ICD-10-CM

## 2023-04-17 RX ORDER — CLONAZEPAM 1 MG/1
1 TABLET ORAL 2 TIMES DAILY
COMMUNITY
End: 2023-04-17 | Stop reason: SDUPTHER

## 2023-04-17 RX ORDER — CLONAZEPAM 1 MG/1
1 TABLET ORAL 2 TIMES DAILY
Qty: 60 TABLET | Refills: 1 | Status: SHIPPED | OUTPATIENT
Start: 2023-04-17 | End: 2023-06-20 | Stop reason: SDUPTHER

## 2023-04-17 NOTE — TELEPHONE ENCOUNTER
Pt called stating you had told her to call back when it was time for a refill of klonopin, but that you were going to change quantity from 30 to 60 tab. Her number is [phone] and she uses [facility]

## 2023-06-20 ENCOUNTER — OFFICE VISIT (OUTPATIENT)
Dept: FAMILY MEDICINE | Facility: CLINIC | Age: 38
End: 2023-06-20
Payer: MEDICAID

## 2023-06-20 VITALS
HEIGHT: 62 IN | WEIGHT: 128 LBS | HEART RATE: 111 BPM | BODY MASS INDEX: 23.55 KG/M2 | DIASTOLIC BLOOD PRESSURE: 78 MMHG | SYSTOLIC BLOOD PRESSURE: 124 MMHG

## 2023-06-20 DIAGNOSIS — F41.9 ANXIETY: ICD-10-CM

## 2023-06-20 DIAGNOSIS — F32.9 REACTIVE DEPRESSION: ICD-10-CM

## 2023-06-20 PROCEDURE — 3078F DIAST BP <80 MM HG: CPT | Mod: CPTII,,, | Performed by: FAMILY MEDICINE

## 2023-06-20 PROCEDURE — 99214 PR OFFICE/OUTPT VISIT, EST, LEVL IV, 30-39 MIN: ICD-10-PCS | Mod: ,,, | Performed by: FAMILY MEDICINE

## 2023-06-20 PROCEDURE — 3074F PR MOST RECENT SYSTOLIC BLOOD PRESSURE < 130 MM HG: ICD-10-PCS | Mod: CPTII,,, | Performed by: FAMILY MEDICINE

## 2023-06-20 PROCEDURE — 3008F BODY MASS INDEX DOCD: CPT | Mod: CPTII,,, | Performed by: FAMILY MEDICINE

## 2023-06-20 PROCEDURE — 3008F PR BODY MASS INDEX (BMI) DOCUMENTED: ICD-10-PCS | Mod: CPTII,,, | Performed by: FAMILY MEDICINE

## 2023-06-20 PROCEDURE — 3078F PR MOST RECENT DIASTOLIC BLOOD PRESSURE < 80 MM HG: ICD-10-PCS | Mod: CPTII,,, | Performed by: FAMILY MEDICINE

## 2023-06-20 PROCEDURE — 1159F PR MEDICATION LIST DOCUMENTED IN MEDICAL RECORD: ICD-10-PCS | Mod: CPTII,,, | Performed by: FAMILY MEDICINE

## 2023-06-20 PROCEDURE — 1159F MED LIST DOCD IN RCRD: CPT | Mod: CPTII,,, | Performed by: FAMILY MEDICINE

## 2023-06-20 PROCEDURE — 3074F SYST BP LT 130 MM HG: CPT | Mod: CPTII,,, | Performed by: FAMILY MEDICINE

## 2023-06-20 PROCEDURE — 99214 OFFICE O/P EST MOD 30 MIN: CPT | Mod: ,,, | Performed by: FAMILY MEDICINE

## 2023-06-20 RX ORDER — CLONAZEPAM 1 MG/1
1 TABLET ORAL 2 TIMES DAILY
Qty: 60 TABLET | Refills: 2 | OUTPATIENT
Start: 2023-06-20 | End: 2023-09-11

## 2023-06-20 RX ORDER — FLUOXETINE HYDROCHLORIDE 20 MG/1
20 CAPSULE ORAL DAILY
Qty: 30 CAPSULE | Refills: 2 | Status: SHIPPED | OUTPATIENT
Start: 2023-06-20 | End: 2023-10-24

## 2023-06-20 NOTE — PROGRESS NOTES
"              Ezra Morrison IV, DO  [facility]  [address] [place] [address]  Phone: [phone]      Subjective     Name: Windy Alcocer   Sex: female  YOB: 1985 (38 y.o.)  MRN: [id number]  Visit Date: 06/20/2023   Chief Complaint: Medication Refill        HISTORY OF PRESENT ILLNESS:    Chief Complaint   Patient presents with    Medication Refill       HPI  See tests that keep you healthy and the problem oriented documentation below.    Tests to Keep You Healthy    Cervical Cancer Screening: Met on 8/23/2022  Tobacco Cessation: NO      Portions of this note may have been created with voice recognition software. Occasional "wrong-word" or "sound-a-like" substitutions may have occurred due to the inherent limitations of voice recognition software. Please, read the note carefully and recognize, using context, where substitutions have occurred.     PAST MEDICAL HISTORY:  Significant Diagnoses - Patient  has a past medical history of Anxiety and Depression.  Medications - Patient has a current medication list which includes the following long-term medication(s): clonazepam and fluoxetine.   Allergies - Patient has No Known Allergies.  Surgeries - Patient  has a past surgical history that includes Breast surgery.  Family History - Patient family history is not on file.      SOCIAL HISTORY:  Tobacco - Patient  reports that she has been smoking cigars. She has never used smokeless tobacco.   Alcohol - Patient  reports no history of alcohol use.   Recreational Drugs - Patient  reports current drug use. Frequency: 2.00 times per week. Drug: Marijuana.       Review of Systems       Past Medical History:   Diagnosis Date    Anxiety     Depression         Review of patient's allergies indicates:  No Known Allergies     Past Surgical History:   Procedure Laterality Date    BREAST SURGERY          History reviewed. No pertinent family history.    Current Outpatient Medications " "  Medication Instructions    clonazePAM (KLONOPIN) 1 mg, Oral, 2 times daily    cyclobenzaprine (FLEXERIL) 10 mg, Oral, Nightly    dextromethorphan-guaiFENesin  mg (MUCINEX DM)  mg per 12 hr tablet 1 tablet, Oral, 2 times daily PRN    FLUoxetine 20 mg, Oral, Daily    ibuprofen (ADVIL,MOTRIN) 800 mg, Oral, 3 times daily    medroxyPROGESTERone (DEPO-PROVERA) 150 mg, Intramuscular, Every 3 months        Objective     /78   Pulse (!) 111   Ht 5' 2" (1.575 m)   Wt 58.1 kg (128 lb)   BMI 23.41 kg/m²     Physical Exam     All recently obtained labs have been reviewed and discussed in detail with the patient.   Assessment     1. Anxiety    2. Reactive depression         Plan        Problem List Items Addressed This Visit          Psychiatric    Anxiety    Relevant Medications    FLUoxetine 20 MG capsule    clonazePAM (KLONOPIN) 1 MG tablet    Reactive depression    Relevant Medications    FLUoxetine 20 MG capsule       No follow-ups on file.    Patient advised that is symptoms worsen, they should call or report directly to local emergency department.    Ezra Morrison, DO  [facility]       "

## 2023-07-11 ENCOUNTER — HOSPITAL ENCOUNTER (EMERGENCY)
Facility: HOSPITAL | Age: 38
Discharge: HOME | End: 2023-07-11
Payer: MEDICAID

## 2023-07-11 VITALS
HEART RATE: 95 BPM | SYSTOLIC BLOOD PRESSURE: 115 MMHG | TEMPERATURE: 100 F | RESPIRATION RATE: 18 BRPM | DIASTOLIC BLOOD PRESSURE: 81 MMHG | OXYGEN SATURATION: 98 % | HEIGHT: 62 IN | BODY MASS INDEX: 23 KG/M2 | WEIGHT: 125 LBS

## 2023-07-11 DIAGNOSIS — N39.0 LOWER URINARY TRACT INFECTIOUS DISEASE: Primary | ICD-10-CM

## 2023-07-11 LAB
BACTERIA #/AREA URNS HPF: ABNORMAL /HPF
BILIRUB UR QL STRIP: NEGATIVE
CLARITY UR: CLEAR
COLOR UR: YELLOW
GLUCOSE UR STRIP-MCNC: NEGATIVE MG/DL
HCG UR QL IA.RAPID: NEGATIVE
KETONES UR STRIP-SCNC: 15 MG/DL
LEUKOCYTE ESTERASE UR QL STRIP: ABNORMAL
MUCOUS THREADS #/AREA URNS HPF: ABNORMAL /HPF
NITRITE UR QL STRIP: NEGATIVE
PH UR STRIP: 6.5 PH UNITS
PROT UR QL STRIP: NEGATIVE
RBC # UR STRIP: ABNORMAL /UL
RBC #/AREA URNS HPF: ABNORMAL /HPF
SP GR UR STRIP: 1.01
SQUAMOUS #/AREA URNS LPF: ABNORMAL /LPF
UROBILINOGEN UR STRIP-ACNC: 2 MG/DL
WBC #/AREA URNS HPF: ABNORMAL /HPF

## 2023-07-11 PROCEDURE — 81001 URINALYSIS AUTO W/SCOPE: CPT | Performed by: NURSE PRACTITIONER

## 2023-07-11 PROCEDURE — 99284 EMERGENCY DEPT VISIT MOD MDM: CPT | Mod: ,,, | Performed by: NURSE PRACTITIONER

## 2023-07-11 PROCEDURE — 87086 URINE CULTURE/COLONY COUNT: CPT | Performed by: NURSE PRACTITIONER

## 2023-07-11 PROCEDURE — 81025 URINE PREGNANCY TEST: CPT | Performed by: NURSE PRACTITIONER

## 2023-07-11 PROCEDURE — 99283 EMERGENCY DEPT VISIT LOW MDM: CPT

## 2023-07-11 PROCEDURE — 99284 PR EMERGENCY DEPT VISIT,LEVEL IV: ICD-10-PCS | Mod: ,,, | Performed by: NURSE PRACTITIONER

## 2023-07-11 RX ORDER — NITROFURANTOIN 25; 75 MG/1; MG/1
100 CAPSULE ORAL 2 TIMES DAILY
Qty: 14 CAPSULE | Refills: 0 | Status: SHIPPED | OUTPATIENT
Start: 2023-07-11 | End: 2023-09-18

## 2023-07-12 NOTE — ED PROVIDER NOTES
Encounter Date: 7/11/2023       History     Chief Complaint   Patient presents with    Hematuria     Patient presents to the ED with complaints of urgency and strong odor with her urine. Denies any fever but does report chills. States symptoms started yesterday. States she has increased her water intake today and has been drinking a lot of cranberry juice and it has helped today.     The history is provided by the patient.   Review of patient's allergies indicates:  No Known Allergies  Past Medical History:   Diagnosis Date    Anxiety     Depression      Past Surgical History:   Procedure Laterality Date    BREAST SURGERY       No family history on file.  Social History     Tobacco Use    Smoking status: Every Day     Types: Cigars    Smokeless tobacco: Never   Substance Use Topics    Alcohol use: Never    Drug use: Yes     Frequency: 2.0 times per week     Types: Marijuana     Comment: last smoked 4 days ago     Review of Systems   Constitutional: Negative.    Respiratory: Negative.     Cardiovascular: Negative.    Genitourinary:  Positive for urgency.   Musculoskeletal: Negative.    Neurological: Negative.    Psychiatric/Behavioral: Negative.     All other systems reviewed and are negative.    Physical Exam     Initial Vitals   BP Pulse Resp Temp SpO2   07/11/23 1943 07/11/23 1935 07/11/23 1935 07/11/23 1935 07/11/23 1935   115/81 95 18 99.5 °F (37.5 °C) 98 %      MAP       --                Physical Exam    Vitals reviewed.  Constitutional: She appears well-developed and well-nourished.   Cardiovascular:  Normal rate, regular rhythm, normal heart sounds and intact distal pulses.           Pulmonary/Chest: Breath sounds normal.   Abdominal: Abdomen is soft. Bowel sounds are normal.   Musculoskeletal:         General: Normal range of motion.     Neurological: She is alert and oriented to person, place, and time. She has normal strength. GCS score is 15. GCS eye subscore is 4. GCS verbal subscore is 5. GCS motor  subscore is 6.   Skin: Skin is warm and dry. Capillary refill takes less than 2 seconds.   Psychiatric: She has a normal mood and affect. Her behavior is normal. Judgment and thought content normal.       Medical Screening Exam   See Full Note    ED Course   Procedures  Labs Reviewed   URINALYSIS, REFLEX TO URINE CULTURE - Abnormal; Notable for the following components:       Result Value    Leukocytes, UA Trace (*)     Ketones, UA 15 (*)     Urobilinogen, UA 2.0 (*)     Blood, UA Moderate (*)     All other components within normal limits   URINALYSIS, MICROSCOPIC - Abnormal; Notable for the following components:    WBC, UA 5-10 (*)     RBC, UA 5-10 (*)     Bacteria, UA Moderate (*)     Squamous Epithelial Cells, UA Few (*)     Mucus, UA Rare (*)     All other components within normal limits   HCG QUALITATIVE URINE - Normal   CULTURE, URINE          Imaging Results    None          Medications - No data to display  Medical Decision Making:   Clinical Tests:   Lab Tests: Ordered and Reviewed  The following lab test(s) were unremarkable: Urinalysis and UPT  ED Management:  MDM    Patient presents for emergent evaluation of acute urgency and urinary odor that poses a threat to life and/or bodily function.    In the ED patient found to have acute urinary tract infection.    I ordered labs and personally reviewed them.  Labs significant for positive for leukocytes and WBC's      Discharge MDM  I discussed the treatment and discharge plan with the patient including taking oral antibiotics for her urinary tract infection.    Patient was discharged in stable condition.  Detailed return precautions discussed.                        Clinical Impression:   Final diagnoses:  [N39.0] Lower urinary tract infectious disease (Primary)        ED Disposition Condition    Discharge Stable          ED Prescriptions       Medication Sig Dispense Start Date End Date Auth. Provider    nitrofurantoin, macrocrystal-monohydrate, (MACROBID)  100 MG capsule Take 1 capsule (100 mg total) by mouth 2 (two) times daily. 14 capsule 7/11/2023 -- HARDY Small          Follow-up Information       Follow up With Specialties Details Why Contact Info    Ezra Morrison IV, DO Family Medicine In 1 week  [address] [place] [address]  [phone]               HARDY Small  07/11/23 2038

## 2023-07-12 NOTE — ED TRIAGE NOTES
"Pt reports she believes she may have a UTI pt reports she has been drinking lots of "mellow yellow" and her urine is dark so it is probably a UTI because she has "several" of those.   "

## 2023-07-14 LAB — UA COMPLETE W REFLEX CULTURE PNL UR: NORMAL

## 2023-09-06 DIAGNOSIS — M25.512 PAIN OF BOTH SHOULDER JOINTS: ICD-10-CM

## 2023-09-06 DIAGNOSIS — M25.511 PAIN OF BOTH SHOULDER JOINTS: ICD-10-CM

## 2023-09-07 RX ORDER — IBUPROFEN 800 MG/1
800 TABLET ORAL 3 TIMES DAILY
Qty: 30 TABLET | Refills: 0 | OUTPATIENT
Start: 2023-09-07 | End: 2023-12-12

## 2023-09-11 ENCOUNTER — HOSPITAL ENCOUNTER (EMERGENCY)
Facility: HOSPITAL | Age: 38
Discharge: HOME | End: 2023-09-11
Payer: MEDICAID

## 2023-09-11 VITALS
TEMPERATURE: 98 F | RESPIRATION RATE: 18 BRPM | HEIGHT: 62 IN | WEIGHT: 119 LBS | SYSTOLIC BLOOD PRESSURE: 120 MMHG | BODY MASS INDEX: 21.9 KG/M2 | DIASTOLIC BLOOD PRESSURE: 81 MMHG | HEART RATE: 96 BPM | OXYGEN SATURATION: 100 %

## 2023-09-11 DIAGNOSIS — F13.930 BENZODIAZEPINE WITHDRAWAL WITHOUT COMPLICATION: Primary | ICD-10-CM

## 2023-09-11 DIAGNOSIS — F41.9 ANXIETY: ICD-10-CM

## 2023-09-11 DIAGNOSIS — R00.0 TACHYCARDIA: ICD-10-CM

## 2023-09-11 DIAGNOSIS — D50.9 IRON DEFICIENCY ANEMIA, UNSPECIFIED IRON DEFICIENCY ANEMIA TYPE: ICD-10-CM

## 2023-09-11 LAB
ALBUMIN SERPL BCP-MCNC: 4.1 G/DL (ref 3.5–5)
ALBUMIN/GLOB SERPL: 1.3 {RATIO}
ALP SERPL-CCNC: 73 U/L (ref 37–98)
ALT SERPL W P-5'-P-CCNC: 23 U/L (ref 13–56)
ANION GAP SERPL CALCULATED.3IONS-SCNC: 14 MMOL/L (ref 7–16)
ANISOCYTOSIS BLD QL SMEAR: ABNORMAL
AST SERPL W P-5'-P-CCNC: 14 U/L (ref 15–37)
BASOPHILS # BLD AUTO: 0.01 K/UL (ref 0–0.2)
BASOPHILS NFR BLD AUTO: 0.2 % (ref 0–1)
BILIRUB SERPL-MCNC: 0.7 MG/DL (ref ?–1.2)
BUN SERPL-MCNC: 13 MG/DL (ref 7–18)
BUN/CREAT SERPL: 20 (ref 6–20)
CALCIUM SERPL-MCNC: 9 MG/DL (ref 8.5–10.1)
CHLORIDE SERPL-SCNC: 100 MMOL/L (ref 98–107)
CO2 SERPL-SCNC: 27 MMOL/L (ref 21–32)
CREAT SERPL-MCNC: 0.64 MG/DL (ref 0.55–1.02)
DIFFERENTIAL METHOD BLD: ABNORMAL
EGFR (NO RACE VARIABLE) (RUSH/TITUS): 117 ML/MIN/1.73M2
EOSINOPHIL # BLD AUTO: 0.21 K/UL (ref 0–0.5)
EOSINOPHIL NFR BLD AUTO: 3.5 % (ref 1–4)
ERYTHROCYTE [DISTWIDTH] IN BLOOD BY AUTOMATED COUNT: 14.6 % (ref 11.5–14.5)
GLOBULIN SER-MCNC: 3.2 G/DL (ref 2–4)
GLUCOSE SERPL-MCNC: 96 MG/DL (ref 74–106)
HCT VFR BLD AUTO: 39.9 % (ref 38–47)
HGB BLD-MCNC: 13.1 G/DL (ref 12–16)
HYPOCHROMIA BLD QL SMEAR: ABNORMAL
LYMPHOCYTES # BLD AUTO: 3.39 K/UL (ref 1–4.8)
LYMPHOCYTES NFR BLD AUTO: 56.7 % (ref 27–41)
LYMPHOCYTES NFR BLD MANUAL: 67 % (ref 27–41)
MAGNESIUM SERPL-MCNC: 2.1 MG/DL (ref 1.7–2.3)
MCH RBC QN AUTO: 23.3 PG (ref 27–31)
MCHC RBC AUTO-ENTMCNC: 32.8 G/DL (ref 32–36)
MCV RBC AUTO: 70.9 FL (ref 80–96)
MICROCYTES BLD QL SMEAR: ABNORMAL
MONOCYTES # BLD AUTO: 0.56 K/UL (ref 0–0.8)
MONOCYTES NFR BLD AUTO: 9.4 % (ref 2–6)
MONOCYTES NFR BLD MANUAL: 8 % (ref 2–6)
MPC BLD CALC-MCNC: ABNORMAL G/DL
NEUTROPHILS # BLD AUTO: 1.81 K/UL (ref 1.8–7.7)
NEUTROPHILS NFR BLD AUTO: 30.2 % (ref 53–65)
NEUTS SEG NFR BLD MANUAL: 25 % (ref 50–62)
NRBC BLD MANUAL-RTO: ABNORMAL %
PLATELET # BLD AUTO: 175 K/UL (ref 150–400)
PLATELET MORPHOLOGY: ABNORMAL
POTASSIUM SERPL-SCNC: 3.5 MMOL/L (ref 3.5–5.1)
PROT SERPL-MCNC: 7.3 G/DL (ref 6.4–8.2)
RBC # BLD AUTO: 5.63 M/UL (ref 4.2–5.4)
SODIUM SERPL-SCNC: 137 MMOL/L (ref 136–145)
TROPONIN I SERPL DL<=0.01 NG/ML-MCNC: <4 PG/ML
WBC # BLD AUTO: 5.98 K/UL (ref 4.5–11)

## 2023-09-11 PROCEDURE — 83735 ASSAY OF MAGNESIUM: CPT | Performed by: NURSE PRACTITIONER

## 2023-09-11 PROCEDURE — 93010 EKG 12-LEAD: ICD-10-PCS | Mod: ,,, | Performed by: HOSPITALIST

## 2023-09-11 PROCEDURE — 99284 PR EMERGENCY DEPT VISIT,LEVEL IV: ICD-10-PCS | Mod: ,,, | Performed by: NURSE PRACTITIONER

## 2023-09-11 PROCEDURE — 25000003 PHARM REV CODE 250: Performed by: NURSE PRACTITIONER

## 2023-09-11 PROCEDURE — 80053 COMPREHEN METABOLIC PANEL: CPT | Performed by: NURSE PRACTITIONER

## 2023-09-11 PROCEDURE — 93010 ELECTROCARDIOGRAM REPORT: CPT | Mod: ,,, | Performed by: HOSPITALIST

## 2023-09-11 PROCEDURE — 99284 EMERGENCY DEPT VISIT MOD MDM: CPT | Mod: 25

## 2023-09-11 PROCEDURE — 99284 EMERGENCY DEPT VISIT MOD MDM: CPT | Mod: ,,, | Performed by: NURSE PRACTITIONER

## 2023-09-11 PROCEDURE — 93005 ELECTROCARDIOGRAM TRACING: CPT

## 2023-09-11 PROCEDURE — 85025 COMPLETE CBC W/AUTO DIFF WBC: CPT | Performed by: NURSE PRACTITIONER

## 2023-09-11 PROCEDURE — 84484 ASSAY OF TROPONIN QUANT: CPT | Performed by: NURSE PRACTITIONER

## 2023-09-11 PROCEDURE — 96360 HYDRATION IV INFUSION INIT: CPT

## 2023-09-11 RX ORDER — CLONAZEPAM 0.5 MG/1
0.5 TABLET ORAL 2 TIMES DAILY
Qty: 30 TABLET | Refills: 0 | Status: SHIPPED | OUTPATIENT
Start: 2023-09-11 | End: 2023-09-18

## 2023-09-11 RX ORDER — CITALOPRAM 10 MG/1
10 TABLET ORAL DAILY
Qty: 30 TABLET | Refills: 0 | Status: SHIPPED | OUTPATIENT
Start: 2023-09-11 | End: 2023-10-24 | Stop reason: SDUPTHER

## 2023-09-11 RX ADMIN — SODIUM CHLORIDE 1000 ML: 9 INJECTION, SOLUTION INTRAVENOUS at 07:09

## 2023-09-11 NOTE — DISCHARGE INSTRUCTIONS
Take Rx as directed  Increase Protein Intake  Follow up with your pcp in 1-2 weeks, sooner if needed

## 2023-09-11 NOTE — ED PROVIDER NOTES
"Encounter Date: 9/11/2023       History     Chief Complaint   Patient presents with    Arm Tingle     Pt c/o a tingle in left arm that began last night. Also states she ran out of clonazepam approx one week ago.      Presents to ED with c/o intermittent Left Arm "tingling and numbness." Says she woke up with these symptoms. PMHx includes anemia and "severe anxiety." Says she was taking Klonopin 1mg bid but ran out several days ago and lost her insurance so she has been unable to  refills. She also reports several stressors recently and says she is temporarily homeless and is living with friends and relatives. No recent illness otherwise.    The history is provided by the patient.     Review of patient's allergies indicates:  No Known Allergies  Past Medical History:   Diagnosis Date    Anxiety     Depression      Past Surgical History:   Procedure Laterality Date    BREAST SURGERY       No family history on file.  Social History     Tobacco Use    Smoking status: Every Day     Types: Cigars    Smokeless tobacco: Never   Substance Use Topics    Alcohol use: Yes    Drug use: Yes     Frequency: 2.0 times per week     Types: Marijuana     Comment: last smoked 4 days ago     Review of Systems   Constitutional:  Negative for fatigue.   HENT: Negative.     Eyes: Negative.    Respiratory: Negative.     Cardiovascular: Negative.    Gastrointestinal: Negative.    Musculoskeletal:  Positive for myalgias.   Neurological: Negative.    Psychiatric/Behavioral:  The patient is nervous/anxious.    All other systems reviewed and are negative.      Physical Exam     Initial Vitals [09/11/23 0741]   BP Pulse Resp Temp SpO2   128/84 107 16 98 °F (36.7 °C) 100 %      MAP       --         Physical Exam    Nursing note and vitals reviewed.  Constitutional: She appears well-developed and well-nourished.   HENT:   Head: Normocephalic and atraumatic.   Nose: Nose normal.   Mouth/Throat: Oropharynx is clear and moist.   Eyes: EOM are " normal. Pupils are equal, round, and reactive to light.   Conjunctival Pallor   Neck: Neck supple.   Normal range of motion.  Cardiovascular:  Regular rhythm and normal heart sounds.           No murmur heard.  Tachycardia 107 BPM   Pulmonary/Chest: Breath sounds normal.   Abdominal: Abdomen is soft. Bowel sounds are normal. She exhibits no distension. There is no abdominal tenderness.   Musculoskeletal:         General: Tenderness present. Normal range of motion.      Cervical back: Normal range of motion and neck supple.      Comments: Left Shoulder Tender w/ palpation and ROM. No deformity     Lymphadenopathy:     She has no cervical adenopathy.   Neurological: She is alert and oriented to person, place, and time. She has normal strength. GCS score is 15. GCS eye subscore is 4. GCS verbal subscore is 5. GCS motor subscore is 6.   Skin: Skin is warm and dry. Capillary refill takes less than 2 seconds.   Psychiatric: She has a normal mood and affect.   Anxious, Upset, Crying at intervals during interview / exam          Medical Screening Exam   See Full Note    ED Course   Procedures  Labs Reviewed   COMPREHENSIVE METABOLIC PANEL - Abnormal; Notable for the following components:       Result Value    AST 14 (*)     All other components within normal limits   CBC WITH DIFFERENTIAL - Abnormal; Notable for the following components:    RBC 5.63 (*)     MCV 70.9 (*)     MCH 23.3 (*)     RDW 14.6 (*)     Neutrophils % 30.2 (*)     Lymphocytes % 56.7 (*)     Monocytes % 9.4 (*)     All other components within normal limits   MANUAL DIFFERENTIAL - Abnormal; Notable for the following components:    Segmented Neutrophils, Man % 25 (*)     Lymphocytes, Man % 67 (*)     Monocytes, Man % 8 (*)     Platelet Morphology Few Large Platelets (*)     All other components within normal limits   MAGNESIUM - Normal   TROPONIN I - Normal   CBC W/ AUTO DIFFERENTIAL    Narrative:     The following orders were created for panel order CBC  auto differential.  Procedure                               Abnormality         Status                     ---------                               -----------         ------                     CBC with Differential[[id number]]       Abnormal            Final result                 Please view results for these tests on the individual orders.     EKG Readings: (Independently Interpreted)   Initial Reading: No STEMI. Rhythm: Normal Sinus Rhythm. Heart Rate: 111. Ectopy: No Ectopy. Axis: Left Axis Deviation.       Imaging Results    None          Medications   sodium chloride 0.9% bolus 1,000 mL 1,000 mL (0 mLs Intravenous Stopped 9/11/23 0855)     Medical Decision Making  Ddx: Anemia, MI, Anxiety, Benzo Withdrawal    MCV--70.9, History of Iron Deficiency Anemia--not currently taking iron supplements, Troponin is WNL. EKG NSR (Left Axis Deviation). No acute changes.     Discussed plan of care w/ patient. Will DC home with Rx for medications. Advised to f/u with pcp in 1-2 weeks. Advised against abruptly stopping klonopin--needs tapering, and to resume taking otc iron supplement.       Amount and/or Complexity of Data Reviewed  Labs: ordered.    Risk  Prescription drug management.                               Clinical Impression:   Final diagnoses:  [R00.0] Tachycardia  [F13.930] Benzodiazepine withdrawal without complication (Primary)  [F41.9] Anxiety  [D50.9] Iron deficiency anemia, unspecified iron deficiency anemia type        ED Disposition Condition    Discharge Good          ED Prescriptions       Medication Sig Dispense Start Date End Date Auth. Provider    clonazePAM (KLONOPIN) 0.5 MG tablet Take 1 tablet (0.5 mg total) by mouth 2 (two) times daily. 30 tablet 9/11/2023 9/10/2024 Issa Soliz, NP    citalopram (CELEXA) 10 MG tablet Take 1 tablet (10 mg total) by mouth once daily. 30 tablet 9/11/2023 9/10/2024 Issa Soliz, DHIRAJ          Follow-up Information       Follow up With Specialties Details  Why Contact Info    Ezra Morrison IV, DO Family Medicine  As needed [address] [facility] [address]  [place] [address]  [phone]               Issa Soliz, NP  09/12/23 0420

## 2023-09-15 ENCOUNTER — PATIENT MESSAGE (OUTPATIENT)
Dept: FAMILY MEDICINE | Facility: CLINIC | Age: 38
End: 2023-09-15
Payer: MEDICAID

## 2023-09-15 DIAGNOSIS — F41.9 ANXIETY: Primary | ICD-10-CM

## 2023-09-15 PROCEDURE — 99499 NO LOS: ICD-10-PCS | Mod: ,,, | Performed by: FAMILY MEDICINE

## 2023-09-15 PROCEDURE — 99499 UNLISTED E&M SERVICE: CPT | Mod: ,,, | Performed by: FAMILY MEDICINE

## 2023-09-18 RX ORDER — CLONAZEPAM 0.5 MG/1
0.5 TABLET ORAL NIGHTLY
Qty: 15 TABLET | Refills: 0 | Status: SHIPPED | OUTPATIENT
Start: 2023-09-26 | End: 2023-10-24 | Stop reason: SDUPTHER

## 2023-09-18 NOTE — TELEPHONE ENCOUNTER
Patient unable to afford office visits with titration of medications.  Was most recently seen in the emergency department for benzodiazepine withdrawal symptoms.  She was previously on clonazepam 1 mg p.o. b.i.d..  She was reduced to clonazepam 0.5 mg p.o. b.i.d. on September 11th for 15 days.  I will fill this prescription from September 26th for an additional 15 days with clonazepam 0.5 mg p.o. q.h.s..  Given the long half-life of the medication this taper should be sufficient.  Patient should consider alternatives such as increasing SSRI therapy as any benzodiazepine should be monitor.

## 2023-10-24 ENCOUNTER — OFFICE VISIT (OUTPATIENT)
Dept: FAMILY MEDICINE | Facility: CLINIC | Age: 38
End: 2023-10-24
Payer: MEDICAID

## 2023-10-24 VITALS
DIASTOLIC BLOOD PRESSURE: 72 MMHG | HEART RATE: 128 BPM | SYSTOLIC BLOOD PRESSURE: 103 MMHG | HEIGHT: 62 IN | BODY MASS INDEX: 23.19 KG/M2 | WEIGHT: 126 LBS

## 2023-10-24 DIAGNOSIS — R94.31 ABNORMAL EKG: ICD-10-CM

## 2023-10-24 DIAGNOSIS — M62.838 MUSCLE SPASM: ICD-10-CM

## 2023-10-24 DIAGNOSIS — F41.9 ANXIETY: ICD-10-CM

## 2023-10-24 DIAGNOSIS — Z13.31 POSITIVE DEPRESSION SCREENING: Primary | ICD-10-CM

## 2023-10-24 PROCEDURE — 3074F PR MOST RECENT SYSTOLIC BLOOD PRESSURE < 130 MM HG: ICD-10-PCS | Mod: CPTII,,, | Performed by: FAMILY MEDICINE

## 2023-10-24 PROCEDURE — 99214 PR OFFICE/OUTPT VISIT, EST, LEVL IV, 30-39 MIN: ICD-10-PCS | Mod: ,,, | Performed by: FAMILY MEDICINE

## 2023-10-24 PROCEDURE — 80053 COMPREHEN METABOLIC PANEL: CPT | Mod: ,,, | Performed by: CLINICAL MEDICAL LABORATORY

## 2023-10-24 PROCEDURE — 1159F MED LIST DOCD IN RCRD: CPT | Mod: CPTII,,, | Performed by: FAMILY MEDICINE

## 2023-10-24 PROCEDURE — 80053 COMPREHENSIVE METABOLIC PANEL: ICD-10-PCS | Mod: ,,, | Performed by: CLINICAL MEDICAL LABORATORY

## 2023-10-24 PROCEDURE — 3074F SYST BP LT 130 MM HG: CPT | Mod: CPTII,,, | Performed by: FAMILY MEDICINE

## 2023-10-24 PROCEDURE — 3078F PR MOST RECENT DIASTOLIC BLOOD PRESSURE < 80 MM HG: ICD-10-PCS | Mod: CPTII,,, | Performed by: FAMILY MEDICINE

## 2023-10-24 PROCEDURE — 3008F BODY MASS INDEX DOCD: CPT | Mod: CPTII,,, | Performed by: FAMILY MEDICINE

## 2023-10-24 PROCEDURE — 3008F PR BODY MASS INDEX (BMI) DOCUMENTED: ICD-10-PCS | Mod: CPTII,,, | Performed by: FAMILY MEDICINE

## 2023-10-24 PROCEDURE — 85025 COMPLETE CBC W/AUTO DIFF WBC: CPT | Mod: ,,, | Performed by: CLINICAL MEDICAL LABORATORY

## 2023-10-24 PROCEDURE — 1159F PR MEDICATION LIST DOCUMENTED IN MEDICAL RECORD: ICD-10-PCS | Mod: CPTII,,, | Performed by: FAMILY MEDICINE

## 2023-10-24 PROCEDURE — 85025 CBC WITH DIFFERENTIAL: ICD-10-PCS | Mod: ,,, | Performed by: CLINICAL MEDICAL LABORATORY

## 2023-10-24 PROCEDURE — 3078F DIAST BP <80 MM HG: CPT | Mod: CPTII,,, | Performed by: FAMILY MEDICINE

## 2023-10-24 PROCEDURE — 99214 OFFICE O/P EST MOD 30 MIN: CPT | Mod: ,,, | Performed by: FAMILY MEDICINE

## 2023-10-24 RX ORDER — CLONAZEPAM 1 MG/1
1 TABLET ORAL 2 TIMES DAILY
Qty: 180 TABLET | Refills: 0 | Status: SHIPPED | OUTPATIENT
Start: 2023-10-24 | End: 2024-01-09 | Stop reason: SDUPTHER

## 2023-10-24 RX ORDER — CITALOPRAM 10 MG/1
10 TABLET ORAL DAILY
Qty: 30 TABLET | Refills: 2 | Status: SHIPPED | OUTPATIENT
Start: 2023-10-24 | End: 2024-01-09 | Stop reason: SDUPTHER

## 2023-10-24 RX ORDER — CYCLOBENZAPRINE HCL 10 MG
10 TABLET ORAL NIGHTLY
Qty: 10 TABLET | Refills: 0 | Status: SHIPPED | OUTPATIENT
Start: 2023-10-24 | End: 2023-11-03

## 2023-10-24 NOTE — PROGRESS NOTES
"              Ezra Morrison IV, DO  [facility]  [address] [place] [address]  Phone: [phone]      Subjective     Name: Windy Alcocer   Sex: female  YOB: 1985 (38 y.o.)  MRN: [id number]  Visit Date: 10/24/2023   Chief Complaint: Medication Refill        HISTORY OF PRESENT ILLNESS:    Chief Complaint   Patient presents with    Medication Refill       I have reviewed the positive depression score which warrants active treatment with psychotherapy and/or medications. celexa    See tests that keep you healthy and the problem oriented documentation below.    Tests to Keep You Healthy    Cervical Cancer Screening: Met on 8/24/2023  Tobacco Cessation: NO      Portions of this note may have been created with voice recognition software. Occasional "wrong-word" or "sound-a-like" substitutions may have occurred due to the inherent limitations of voice recognition software. Please, read the note carefully and recognize, using context, where substitutions have occurred.     PAST MEDICAL HISTORY:  Significant Diagnoses - Patient  has a past medical history of Anxiety and Depression.  Medications - Patient has a current medication list which includes the following long-term medication(s): citalopram, clonazepam, and fluoxetine.   Allergies - Patient has No Known Allergies.  Surgeries - Patient  has a past surgical history that includes Breast surgery.  Family History - Patient family history is not on file.      SOCIAL HISTORY:  Tobacco - Patient  reports that she has been smoking cigars. She has been exposed to tobacco smoke. She has never used smokeless tobacco.   Alcohol - Patient  reports current alcohol use.   Recreational Drugs - Patient  reports current drug use. Frequency: 2.00 times per week. Drug: Marijuana.       Review of Systems   All other systems reviewed and are negative.       Past Medical History:   Diagnosis Date    Anxiety     Depression         Review of " "patient's allergies indicates:  No Known Allergies     Past Surgical History:   Procedure Laterality Date    BREAST SURGERY          History reviewed. No pertinent family history.    Current Outpatient Medications   Medication Instructions    citalopram (CELEXA) 10 mg, Oral, Daily    clonazePAM (KLONOPIN) 0.5 mg, Oral, Nightly    cyclobenzaprine (FLEXERIL) 10 mg, Oral, Nightly    dextromethorphan-guaiFENesin  mg (MUCINEX DM)  mg per 12 hr tablet 1 tablet, Oral, 2 times daily PRN    FLUoxetine 20 mg, Oral, Daily    ibuprofen (ADVIL,MOTRIN) 800 mg, Oral, 3 times daily    medroxyPROGESTERone (DEPO-PROVERA) 150 mg, Intramuscular, Every 3 months        Objective     /72   Pulse (!) 128   Ht 5' 2" (1.575 m)   Wt 57.2 kg (126 lb)   BMI 23.05 kg/m²     Physical Exam  Constitutional:       General: She is not in acute distress.     Appearance: Normal appearance. She is not ill-appearing.   HENT:      Head: Normocephalic and atraumatic.      Right Ear: External ear normal.      Left Ear: External ear normal.   Eyes:      Extraocular Movements: Extraocular movements intact.      Conjunctiva/sclera: Conjunctivae normal.   Cardiovascular:      Rate and Rhythm: Normal rate.      Heart sounds: No murmur heard.  Pulmonary:      Effort: Pulmonary effort is normal.   Musculoskeletal:      Cervical back: Normal range of motion.   Skin:     General: Skin is warm and dry.      Coloration: Skin is not jaundiced.      Findings: No rash.   Neurological:      Mental Status: She is alert.   Psychiatric:         Mood and Affect: Mood normal.        All recently obtained labs have been reviewed and discussed in detail with the patient.   Assessment     1. Positive depression screening    2. Anxiety    3. Muscle spasm         Plan        Problem List Items Addressed This Visit          Psychiatric    Anxiety    Relevant Medications    clonazePAM (KLONOPIN) 1 MG tablet     Other Visit Diagnoses       Positive " depression screening    -  Primary    I have reviewed the positive depression score which warrants active treatment with psychotherapy and/or medications.    Relevant Medications    citalopram (CELEXA) 10 MG tablet    Muscle spasm        Relevant Medications    cyclobenzaprine (FLEXERIL) 10 MG tablet            No follow-ups on file.    Patient advised that is symptoms worsen, they should call or report directly to local emergency department.    Ezra Morrison,   [facility]

## 2023-10-25 LAB
ALBUMIN SERPL BCP-MCNC: 3.9 G/DL (ref 3.5–5)
ALBUMIN/GLOB SERPL: 1.3 {RATIO}
ALP SERPL-CCNC: 76 U/L (ref 37–98)
ALT SERPL W P-5'-P-CCNC: 32 U/L (ref 13–56)
ANION GAP SERPL CALCULATED.3IONS-SCNC: 10 MMOL/L (ref 7–16)
AST SERPL W P-5'-P-CCNC: 20 U/L (ref 15–37)
BASOPHILS # BLD AUTO: 0.06 K/UL (ref 0–0.2)
BASOPHILS NFR BLD AUTO: 0.8 % (ref 0–1)
BILIRUB SERPL-MCNC: 0.2 MG/DL (ref ?–1.2)
BUN SERPL-MCNC: 12 MG/DL (ref 7–18)
BUN/CREAT SERPL: 16 (ref 6–20)
BURR CELLS BLD QL SMEAR: ABNORMAL
CALCIUM SERPL-MCNC: 8.6 MG/DL (ref 8.5–10.1)
CHLORIDE SERPL-SCNC: 112 MMOL/L (ref 98–107)
CO2 SERPL-SCNC: 25 MMOL/L (ref 21–32)
CREAT SERPL-MCNC: 0.76 MG/DL (ref 0.55–1.02)
CRENATED CELLS: ABNORMAL
DIFFERENTIAL METHOD BLD: ABNORMAL
EGFR (NO RACE VARIABLE) (RUSH/TITUS): 104 ML/MIN/1.73M2
EOSINOPHIL # BLD AUTO: 0.18 K/UL (ref 0–0.5)
EOSINOPHIL NFR BLD AUTO: 2.5 % (ref 1–4)
ERYTHROCYTE [DISTWIDTH] IN BLOOD BY AUTOMATED COUNT: 16.2 % (ref 11.5–14.5)
GLOBULIN SER-MCNC: 3.1 G/DL (ref 2–4)
GLUCOSE SERPL-MCNC: 72 MG/DL (ref 74–106)
HCT VFR BLD AUTO: 38.2 % (ref 38–47)
HGB BLD-MCNC: 12.1 G/DL (ref 12–16)
HYPOCHROMIA BLD QL SMEAR: ABNORMAL
IMM GRANULOCYTES # BLD AUTO: 0.02 K/UL (ref 0–0.04)
IMM GRANULOCYTES NFR BLD: 0.3 % (ref 0–0.4)
LYMPHOCYTES # BLD AUTO: 3.39 K/UL (ref 1–4.8)
LYMPHOCYTES NFR BLD AUTO: 46.2 % (ref 27–41)
MCH RBC QN AUTO: 23.5 PG (ref 27–31)
MCHC RBC AUTO-ENTMCNC: 31.7 G/DL (ref 32–36)
MCV RBC AUTO: 74.3 FL (ref 80–96)
MICROCYTES BLD QL SMEAR: ABNORMAL
MONOCYTES # BLD AUTO: 0.72 K/UL (ref 0–0.8)
MONOCYTES NFR BLD AUTO: 9.8 % (ref 2–6)
MPC BLD CALC-MCNC: ABNORMAL G/DL
NEUTROPHILS # BLD AUTO: 2.96 K/UL (ref 1.8–7.7)
NEUTROPHILS NFR BLD AUTO: 40.4 % (ref 53–65)
NRBC # BLD AUTO: 0 X10E3/UL
NRBC, AUTO (.00): 0 %
OVALOCYTES BLD QL SMEAR: ABNORMAL
PLATELET # BLD AUTO: 181 K/UL (ref 150–400)
PLATELET MORPHOLOGY: ABNORMAL
POTASSIUM SERPL-SCNC: 3.8 MMOL/L (ref 3.5–5.1)
PROT SERPL-MCNC: 7 G/DL (ref 6.4–8.2)
RBC # BLD AUTO: 5.14 M/UL (ref 4.2–5.4)
SODIUM SERPL-SCNC: 143 MMOL/L (ref 136–145)
TARGETS BLD QL SMEAR: ABNORMAL
WBC # BLD AUTO: 7.33 K/UL (ref 4.5–11)

## 2023-10-26 ENCOUNTER — PATIENT MESSAGE (OUTPATIENT)
Dept: FAMILY MEDICINE | Facility: CLINIC | Age: 38
End: 2023-10-26
Payer: MEDICAID

## 2023-12-12 ENCOUNTER — HOSPITAL ENCOUNTER (EMERGENCY)
Facility: HOSPITAL | Age: 38
Discharge: HOME | End: 2023-12-12
Payer: MEDICAID

## 2023-12-12 VITALS
TEMPERATURE: 99 F | RESPIRATION RATE: 18 BRPM | OXYGEN SATURATION: 99 % | HEART RATE: 80 BPM | SYSTOLIC BLOOD PRESSURE: 109 MMHG | DIASTOLIC BLOOD PRESSURE: 70 MMHG | WEIGHT: 125 LBS | BODY MASS INDEX: 23 KG/M2 | HEIGHT: 62 IN

## 2023-12-12 DIAGNOSIS — F41.0 ANXIETY ATTACK: Primary | ICD-10-CM

## 2023-12-12 PROCEDURE — 99284 PR EMERGENCY DEPT VISIT,LEVEL IV: ICD-10-PCS | Mod: ,,, | Performed by: NURSE PRACTITIONER

## 2023-12-12 PROCEDURE — 25000003 PHARM REV CODE 250: Performed by: NURSE PRACTITIONER

## 2023-12-12 PROCEDURE — 99284 EMERGENCY DEPT VISIT MOD MDM: CPT | Mod: ,,, | Performed by: NURSE PRACTITIONER

## 2023-12-12 PROCEDURE — 99283 EMERGENCY DEPT VISIT LOW MDM: CPT

## 2023-12-12 RX ORDER — LORAZEPAM 0.5 MG/1
1 TABLET ORAL
Status: COMPLETED | OUTPATIENT
Start: 2023-12-12 | End: 2023-12-12

## 2023-12-12 RX ADMIN — LORAZEPAM 1 MG: 0.5 TABLET ORAL at 06:12

## 2023-12-13 NOTE — DISCHARGE INSTRUCTIONS
Go home and try to rest.  Try to avoid stressful situations.  Reach out to family/friends to help with your kids.  Call your family doctor/practitioner in the morning to let them know about your ER visit.  They may want to schedule a follow-up appointment with you this week.  You can also call to speak with [facility] [phone]. Return to the ER for thoughts of harm to self or others.    Let your son drive you home, you are not to drive tonight.

## 2023-12-13 NOTE — ED NOTES
PT sitting up in bed talking on phone. Instr pt to try to get some rest. PT continue to maintain her conversation

## 2023-12-13 NOTE — ED PROVIDER NOTES
Encounter Date: 12/12/2023       History     Chief Complaint   Patient presents with    Tachycardia     Increase heart rate for two days    Anxiety     Anxiety and lack of sleep for a couple of days     38 year old AAF presents to the ER for anxiety and sleep disturbance.  Pt has hx of anxiety, where she takes Celexa and Klonopin.  She reports compliance, but reports the pharmacy says she can't get a refill until Monday.  She reports she has been under a lot of stress lately.  She reports she has 8 kids and recently witnessed a death of something.  Pt reports she hasn't had a full nights sleep since Monday.  She report she feels anxious and none of her kids or family can understand.  She deneis SI/HI.  She denies auditory/visual/tactile hallucinations.      The history is provided by the patient.     Review of patient's allergies indicates:  No Known Allergies  Past Medical History:   Diagnosis Date    Anxiety     Depression      Past Surgical History:   Procedure Laterality Date    BREAST SURGERY       History reviewed. No pertinent family history.  Social History     Tobacco Use    Smoking status: Every Day     Types: Cigars, Vaping with nicotine     Passive exposure: Current    Smokeless tobacco: Never   Substance Use Topics    Alcohol use: Yes    Drug use: Not Currently     Frequency: 2.0 times per week     Types: Marijuana     Comment: last smoked 4 days ago     Review of Systems   Constitutional:  Negative for fever.   HENT:  Negative for sore throat.    Respiratory:  Negative for shortness of breath.    Cardiovascular:  Negative for chest pain.   Gastrointestinal:  Negative for nausea.   Genitourinary:  Negative for dysuria.   Musculoskeletal:  Negative for back pain.   Skin:  Negative for rash.   Neurological:  Negative for weakness.   Hematological:  Does not bruise/bleed easily.   Psychiatric/Behavioral:  Positive for sleep disturbance. Negative for agitation, behavioral problems, confusion, decreased  concentration, dysphoric mood, hallucinations, self-injury and suicidal ideas. The patient is nervous/anxious. The patient is not hyperactive.        Physical Exam     Initial Vitals [12/12/23 1810]   BP Pulse Resp Temp SpO2   (!) 138/94 107 20 99 °F (37.2 °C) 100 %      MAP       --         Physical Exam    Nursing note and vitals reviewed.  Constitutional: She appears well-developed and well-nourished. She is not diaphoretic. She is cooperative.  Non-toxic appearance. She does not have a sickly appearance. She does not appear ill. No distress.   HENT:   Head: Normocephalic and atraumatic.   Eyes: Pupils are equal, round, and reactive to light.   Neck: Neck supple.   Cardiovascular:  Normal rate, regular rhythm, normal heart sounds and intact distal pulses.     Exam reveals no gallop and no friction rub.       No murmur heard.  Pulmonary/Chest: Breath sounds normal. No respiratory distress. She has no wheezes. She has no rhonchi. She has no rales. She exhibits no tenderness.   Musculoskeletal:      Cervical back: Neck supple.     Neurological: She is alert and oriented to person, place, and time.   Skin: Skin is warm and dry. Capillary refill takes less than 2 seconds.   Psychiatric: Judgment and thought content normal. Her mood appears anxious. Her affect is not angry, not blunt, not labile and not inappropriate. Her speech is rapid and/or pressured. Her speech is not delayed, not tangential and not slurred. She is not agitated, not aggressive, not hyperactive, not slowed, not withdrawn, not actively hallucinating and not combative. Thought content is not paranoid and not delusional. Cognition and memory are normal. Cognition and memory are not impaired. She does not express impulsivity or inappropriate judgment. She does not exhibit a depressed mood. She expresses no homicidal and no suicidal ideation. She expresses no suicidal plans and no homicidal plans. She is communicative. She exhibits normal recent memory  and normal remote memory. She is attentive.         Medical Screening Exam   See Full Note    ED Course   Procedures  Labs Reviewed - No data to display       Imaging Results    None          Medications   LORazepam tablet 1 mg (1 mg Oral Given 12/12/23 1823)     Medical Decision Making  Will give a dose of PO Ativan here in the ER. Patient's teenage son drove her to the ER tonight.     Problems Addressed:  Anxiety attack: acute illness or injury     Details: Responded well to Ativan.  She will follow-up with PCP this week and return for SI/HI.    Risk  Prescription drug management.               ED Course as of 12/12/23 1856   Tue Dec 12, 2023   1851 Pt reports she is already feeling more relaxed and feels like she wants to go home.  [AG]      ED Course User Index  [AG] Yue Pena FNP                           Clinical Impression:   Final diagnoses:  [F41.0] Anxiety attack (Primary)        ED Disposition Condition    Discharge Stable          ED Prescriptions    None       Follow-up Information       Follow up With Specialties Details Why Contact Info    Ezra Morrison IV, DO Family Medicine Schedule an appointment as soon as possible for a visit in 2 days  [address] [facility] [place] [address]  [phone]               Yue Pena FNP  12/12/23 1854       Yue Pena FNP  12/12/23 1856

## 2023-12-17 ENCOUNTER — HOSPITAL ENCOUNTER (EMERGENCY)
Facility: HOSPITAL | Age: 38
Discharge: HOME | End: 2023-12-17
Payer: MEDICAID

## 2023-12-17 VITALS
HEIGHT: 62 IN | WEIGHT: 115 LBS | RESPIRATION RATE: 18 BRPM | OXYGEN SATURATION: 100 % | HEART RATE: 115 BPM | BODY MASS INDEX: 21.16 KG/M2 | TEMPERATURE: 98 F | SYSTOLIC BLOOD PRESSURE: 116 MMHG | DIASTOLIC BLOOD PRESSURE: 74 MMHG

## 2023-12-17 DIAGNOSIS — F41.0 ANXIETY ATTACK: Primary | ICD-10-CM

## 2023-12-17 PROCEDURE — 25000003 PHARM REV CODE 250: Performed by: NURSE PRACTITIONER

## 2023-12-17 PROCEDURE — 99284 EMERGENCY DEPT VISIT MOD MDM: CPT | Mod: ,,, | Performed by: NURSE PRACTITIONER

## 2023-12-17 PROCEDURE — 99284 PR EMERGENCY DEPT VISIT,LEVEL IV: ICD-10-PCS | Mod: ,,, | Performed by: NURSE PRACTITIONER

## 2023-12-17 PROCEDURE — 99283 EMERGENCY DEPT VISIT LOW MDM: CPT

## 2023-12-17 RX ORDER — HYDROXYZINE PAMOATE 25 MG/1
25 CAPSULE ORAL
Status: COMPLETED | OUTPATIENT
Start: 2023-12-17 | End: 2023-12-17

## 2023-12-17 RX ADMIN — HYDROXYZINE PAMOATE 25 MG: 25 CAPSULE ORAL at 01:12

## 2023-12-17 NOTE — ED PROVIDER NOTES
Encounter Date: 12/17/2023       History     Chief Complaint   Patient presents with    Panic Attack     20 minutes ago she started getting anxious     Patient presents to the ED with complaints of having a panic attack, states she cannot get her medications filled till tomorrow. Reports she was cooking when this episode started today. Patient states she is feeling better already.     The history is provided by the patient.     Review of patient's allergies indicates:  No Known Allergies  Past Medical History:   Diagnosis Date    Anxiety     Depression      Past Surgical History:   Procedure Laterality Date    BREAST SURGERY       History reviewed. No pertinent family history.  Social History     Tobacco Use    Smoking status: Every Day     Types: Cigars, Vaping with nicotine     Passive exposure: Current    Smokeless tobacco: Never   Substance Use Topics    Alcohol use: Yes    Drug use: Not Currently     Frequency: 2.0 times per week     Types: Marijuana     Comment: last smoked 4 days ago     Review of Systems   Constitutional: Negative.    Respiratory: Negative.     Cardiovascular: Negative.    Musculoskeletal: Negative.    Neurological: Negative.    Psychiatric/Behavioral:  The patient is nervous/anxious.    All other systems reviewed and are negative.      Physical Exam     Initial Vitals [12/17/23 1329]   BP Pulse Resp Temp SpO2   116/74 (!) 156 18 98 °F (36.7 °C) 100 %      MAP       --         Physical Exam    Vitals reviewed.  Constitutional: She appears well-developed and well-nourished.   Cardiovascular:  Normal rate, regular rhythm, normal heart sounds and intact distal pulses.           Pulmonary/Chest: Breath sounds normal.   Musculoskeletal:         General: Normal range of motion.     Neurological: She is alert and oriented to person, place, and time. She has normal strength. GCS score is 15. GCS eye subscore is 4. GCS verbal subscore is 5. GCS motor subscore is 6.   Skin: Skin is warm and dry.  Capillary refill takes less than 2 seconds.   Psychiatric: Her behavior is normal. Judgment and thought content normal. Her mood appears anxious. Her speech is rapid and/or pressured.         Medical Screening Exam   See Full Note    ED Course   Procedures  Labs Reviewed - No data to display       Imaging Results    None          Medications   hydrOXYzine pamoate capsule 25 mg (25 mg Oral Given 12/17/23 4332)     Medical Decision Making  MDM    Patient presents for emergent evaluation of acute panic attack that poses a threat to life and/or bodily function.    In the ED patient found to have acute panic attack.      Discharge MDM  I discussed the treatment and discharge plan with the patient. Patient was instructed to follow up with [facility] for counseling to help her find ways to cope with her anxiety.     Patient was managed in the ED with oral Vistaril.    The response to treatment was good.    Patient was discharged in stable condition.  Detailed return precautions discussed.    Risk  Prescription drug management.                                      Clinical Impression:   Final diagnoses:  [F41.0] Anxiety attack (Primary)        ED Disposition Condition    Discharge Stable          ED Prescriptions    None       Follow-up Information    None          Carey Ricci, [facility]  12/17/23 2499

## 2023-12-18 ENCOUNTER — HOSPITAL ENCOUNTER (EMERGENCY)
Facility: HOSPITAL | Age: 38
Discharge: HOME | End: 2023-12-18
Payer: MEDICAID

## 2023-12-18 VITALS
HEIGHT: 62 IN | BODY MASS INDEX: 21.16 KG/M2 | OXYGEN SATURATION: 100 % | TEMPERATURE: 98 F | SYSTOLIC BLOOD PRESSURE: 130 MMHG | RESPIRATION RATE: 18 BRPM | HEART RATE: 107 BPM | DIASTOLIC BLOOD PRESSURE: 72 MMHG | WEIGHT: 115 LBS

## 2023-12-18 DIAGNOSIS — F41.0 ANXIETY ATTACK: Primary | ICD-10-CM

## 2023-12-18 PROCEDURE — 99283 PR EMERGENCY DEPT VISIT,LEVEL III: ICD-10-PCS | Mod: ,,, | Performed by: NURSE PRACTITIONER

## 2023-12-18 PROCEDURE — 99281 EMR DPT VST MAYX REQ PHY/QHP: CPT

## 2023-12-18 PROCEDURE — 99283 EMERGENCY DEPT VISIT LOW MDM: CPT | Mod: ,,, | Performed by: NURSE PRACTITIONER

## 2023-12-18 NOTE — ED TRIAGE NOTES
C/O heart racing that started around 330 am when she woke up and went to restroom. Patient was seen in ED yesterday. Pt came off klonopin abruptly and has been out of it for a week. Pt has rx that will be ready to be picked up today.

## 2023-12-18 NOTE — DISCHARGE INSTRUCTIONS
Get your prescribed medications filled this morning when pharmacy opens, follow up with [facility] this week.

## 2023-12-18 NOTE — ED PROVIDER NOTES
Encounter Date: 12/18/2023       History     Chief Complaint   Patient presents with    Tachycardia     Patient presents to the ED with complaints of anxiety. Reports she was seen yesterday and was able to go home and sleep but when she woke up this morning, she started feeling the anxiety again. Patient has been out of her medications for over a week and she is due to have her medications filled this morning.     The history is provided by the patient.     Review of patient's allergies indicates:  No Known Allergies  Past Medical History:   Diagnosis Date    Anxiety     Depression      Past Surgical History:   Procedure Laterality Date    BREAST SURGERY       History reviewed. No pertinent family history.  Social History     Tobacco Use    Smoking status: Every Day     Types: Cigars, Vaping with nicotine     Passive exposure: Current    Smokeless tobacco: Never   Substance Use Topics    Alcohol use: Yes     Comment: occasionally    Drug use: Not Currently     Frequency: 2.0 times per week     Types: Marijuana     Comment: last smoked 4 days ago     Review of Systems   Constitutional: Negative.    Respiratory: Negative.     Cardiovascular: Negative.    Musculoskeletal: Negative.    Neurological: Negative.    Psychiatric/Behavioral:  The patient is nervous/anxious.    All other systems reviewed and are negative.      Physical Exam     Initial Vitals [12/18/23 0415]   BP Pulse Resp Temp SpO2   130/72 107 18 97.7 °F (36.5 °C) 100 %      MAP       --         Physical Exam    Vitals reviewed.  Constitutional: She appears well-developed and well-nourished.   Cardiovascular:  Normal rate, regular rhythm, normal heart sounds and intact distal pulses.           Pulmonary/Chest: Breath sounds normal.   Musculoskeletal:         General: Normal range of motion.     Neurological: She is alert and oriented to person, place, and time. She has normal strength. GCS score is 15. GCS eye subscore is 4. GCS verbal subscore is 5. GCS  motor subscore is 6.   Skin: Skin is warm and dry. Capillary refill takes less than 2 seconds.   Psychiatric: Her behavior is normal. Judgment and thought content normal. Her mood appears anxious. Her speech is rapid and/or pressured.         Medical Screening Exam   See Full Note    ED Course   Procedures  Labs Reviewed - No data to display       Imaging Results    None          Medications - No data to display  Medical Decision Making  MDM    Patient presents for emergent evaluation of acute anxiety that poses a threat to life and/or bodily function.    In the ED patient found to have acute anxiety attack.      Discharge MDM  I discussed the treatment and discharge plan with the patient.     Patient was discharged in stable condition.  Detailed return precautions discussed.                                      Clinical Impression:   Final diagnoses:  [F41.0] Anxiety attack (Primary)        ED Disposition Condition    Discharge Stable          ED Prescriptions    None       Follow-up Information    None          Carey Ricci, [facility]  12/18/23 0439

## 2024-01-08 ENCOUNTER — PATIENT MESSAGE (OUTPATIENT)
Dept: FAMILY MEDICINE | Facility: CLINIC | Age: 39
End: 2024-01-08
Payer: MEDICAID

## 2024-01-08 DIAGNOSIS — F41.9 ANXIETY: Primary | Chronic | ICD-10-CM

## 2024-01-09 ENCOUNTER — OFFICE VISIT (OUTPATIENT)
Dept: FAMILY MEDICINE | Facility: CLINIC | Age: 39
End: 2024-01-09
Payer: MEDICAID

## 2024-01-09 VITALS
SYSTOLIC BLOOD PRESSURE: 113 MMHG | HEIGHT: 62 IN | BODY MASS INDEX: 21.53 KG/M2 | WEIGHT: 117 LBS | TEMPERATURE: 98 F | HEART RATE: 118 BPM | DIASTOLIC BLOOD PRESSURE: 67 MMHG

## 2024-01-09 DIAGNOSIS — R00.2 PALPITATIONS: ICD-10-CM

## 2024-01-09 DIAGNOSIS — F32.9 REACTIVE DEPRESSION: Chronic | ICD-10-CM

## 2024-01-09 DIAGNOSIS — Z13.31 POSITIVE DEPRESSION SCREENING: ICD-10-CM

## 2024-01-09 DIAGNOSIS — K02.9 DENTAL CARIES: Primary | ICD-10-CM

## 2024-01-09 DIAGNOSIS — F41.9 ANXIETY: ICD-10-CM

## 2024-01-09 PROCEDURE — 3074F SYST BP LT 130 MM HG: CPT | Mod: CPTII,,, | Performed by: FAMILY MEDICINE

## 2024-01-09 PROCEDURE — 3008F BODY MASS INDEX DOCD: CPT | Mod: CPTII,,, | Performed by: FAMILY MEDICINE

## 2024-01-09 PROCEDURE — 3078F DIAST BP <80 MM HG: CPT | Mod: CPTII,,, | Performed by: FAMILY MEDICINE

## 2024-01-09 PROCEDURE — 1159F MED LIST DOCD IN RCRD: CPT | Mod: CPTII,,, | Performed by: FAMILY MEDICINE

## 2024-01-09 PROCEDURE — 99214 OFFICE O/P EST MOD 30 MIN: CPT | Mod: ,,, | Performed by: FAMILY MEDICINE

## 2024-01-09 RX ORDER — PENICILLIN V POTASSIUM 500 MG/1
500 TABLET, FILM COATED ORAL EVERY 8 HOURS
Qty: 21 TABLET | Refills: 0 | Status: SHIPPED | OUTPATIENT
Start: 2024-01-09 | End: 2024-01-16

## 2024-01-09 RX ORDER — CLONAZEPAM 1 MG/1
1 TABLET ORAL 2 TIMES DAILY
Qty: 180 TABLET | Refills: 0 | Status: SHIPPED | OUTPATIENT
Start: 2024-01-09 | End: 2024-02-06 | Stop reason: SDUPTHER

## 2024-01-09 RX ORDER — CITALOPRAM 10 MG/1
10 TABLET ORAL DAILY
Qty: 30 TABLET | Refills: 2 | Status: SHIPPED | OUTPATIENT
Start: 2024-01-09 | End: 2024-02-13

## 2024-01-09 NOTE — ASSESSMENT & PLAN NOTE
Chronic controlled problem for patient.  We did discuss with the medication was still needed at this time and patient elects to continue Celexa 10 mg p.o. q.d. for now.  We will continue to monitor and titrate as appropriate.  Recommend patient return in 3 months for follow-up.

## 2024-01-09 NOTE — ASSESSMENT & PLAN NOTE
This is undiagnosed problem with the unclear prognosis.  Patient states that she has had palpitations with tachycardia.  She was worried she was having a heart attack and thought about going to the emergency department.  This is most likely secondary to panic attacks she has had an past but I would recommend Holter monitor for 48 hours for this patient.

## 2024-01-09 NOTE — PROGRESS NOTES
"              Ezra Morrison IV, DO  [facility]  [address], [place] [address]  Phone: [phone]      Subjective     Name: Windy Alcocer   Sex: female  YOB: 1985 (38 y.o.)  MRN: [id number]  Visit Date: 01/09/2024   Chief Complaint: Abscess (Dentasl abscess rt upper)        HISTORY OF PRESENT ILLNESS:    Chief Complaint   Patient presents with    Abscess     Dentasl abscess rt upper       HPI  See tests that keep you healthy and the problem oriented documentation below.    All of your core healthy metrics are met.      Portions of this note may have been created with voice recognition software. Occasional "wrong-word" or "sound-a-like" substitutions may have occurred due to the inherent limitations of voice recognition software. Please, read the note carefully and recognize, using context, where substitutions have occurred.     PAST MEDICAL HISTORY:  Significant Diagnoses - Patient  has a past medical history of Anxiety and Depression.  Medications - Patient has a current medication list which includes the following long-term medication(s): citalopram and clonazepam.   Allergies - Patient has No Known Allergies.  Surgeries - Patient  has a past surgical history that includes Breast surgery.  Family History - Patient family history is not on file.      SOCIAL HISTORY:  Tobacco - Patient  reports that she has been smoking cigars and vaping with nicotine. She has been exposed to tobacco smoke. She has never used smokeless tobacco.   Alcohol - Patient  reports current alcohol use.   Recreational Drugs - Patient  reports that she does not currently use drugs after having used the following drugs: Marijuana. Frequency: 2.00 times per week.       Review of Systems   All other systems reviewed and are negative.       Past Medical History:   Diagnosis Date    Anxiety     Depression         Review of patient's allergies indicates:  No Known Allergies     Past Surgical History: " "  Procedure Laterality Date    BREAST SURGERY          No family history on file.    Current Outpatient Medications   Medication Instructions    citalopram (CELEXA) 10 mg, Oral, Daily    clonazePAM (KLONOPIN) 1 mg, Oral, 2 times daily    penicillin v potassium (VEETID) 500 mg, Oral, Every 8 hours        Objective     /67   Pulse (!) 118   Temp 97.8 °F (36.6 °C)   Ht 5' 2" (1.575 m)   Wt 53.1 kg (117 lb)   LMP 12/04/2023 (Approximate)   BMI 21.40 kg/m²     Physical Exam  Constitutional:       General: She is not in acute distress.     Appearance: Normal appearance. She is not ill-appearing.   HENT:      Head: Normocephalic and atraumatic.      Right Ear: External ear normal.      Left Ear: External ear normal.   Eyes:      Extraocular Movements: Extraocular movements intact.      Conjunctiva/sclera: Conjunctivae normal.   Cardiovascular:      Rate and Rhythm: Normal rate.      Heart sounds: No murmur heard.  Pulmonary:      Effort: Pulmonary effort is normal.   Musculoskeletal:      Cervical back: Normal range of motion.   Skin:     General: Skin is warm and dry.      Coloration: Skin is not jaundiced.      Findings: No rash.   Neurological:      Mental Status: She is alert.   Psychiatric:         Mood and Affect: Mood normal.        All recently obtained labs have been reviewed and discussed in detail with the patient.   Assessment     1. Dental caries    2. Anxiety    3. Palpitations    4. Positive depression screening    5. Reactive depression         Plan        Problem List Items Addressed This Visit          Psychiatric    Anxiety (Chronic)     Chronic control problem for patient.  Currently on clonazepam 1 mg p.o. b.i.d..           Relevant Medications    clonazePAM (KLONOPIN) 1 MG tablet    Reactive depression (Chronic)     Chronic controlled problem for patient.  We did discuss with the medication was still needed at this time and patient elects to continue Celexa 10 mg p.o. q.d. for now.  We " will continue to monitor and titrate as appropriate.  Recommend patient return in 3 months for follow-up.            Cardiac/Vascular    Palpitations     This is undiagnosed problem with the unclear prognosis.  Patient states that she has had palpitations with tachycardia.  She was worried she was having a heart attack and thought about going to the emergency department.  This is most likely secondary to panic attacks she has had an past but I would recommend Holter monitor for 48 hours for this patient.         Relevant Orders    Holter monitor - 48 hour     Other Visit Diagnoses       Dental caries    -  Primary    Relevant Medications    penicillin v potassium (VEETID) 500 MG tablet    Positive depression screening        I have reviewed the positive depression score which warrants active treatment with psychotherapy and/or medications.    Relevant Medications    citalopram (CELEXA) 10 MG tablet            No follow-ups on file.    Patient advised that is symptoms worsen, they should call or report directly to local emergency department.    Ezra Morrison,   [facility]

## 2024-01-10 ENCOUNTER — HOSPITAL ENCOUNTER (OUTPATIENT)
Dept: CARDIOLOGY | Facility: HOSPITAL | Age: 39
Discharge: HOME | End: 2024-01-10
Attending: FAMILY MEDICINE
Payer: MEDICAID

## 2024-01-10 DIAGNOSIS — R00.2 PALPITATIONS: ICD-10-CM

## 2024-01-10 PROCEDURE — 93226 XTRNL ECG REC<48 HR SCAN A/R: CPT

## 2024-01-11 RX ORDER — BUSPIRONE HYDROCHLORIDE 15 MG/1
15 TABLET ORAL 3 TIMES DAILY
Qty: 15 TABLET | Refills: 0 | Status: SHIPPED | OUTPATIENT
Start: 2024-01-11 | End: 2024-02-13

## 2024-01-12 ENCOUNTER — HOSPITAL ENCOUNTER (EMERGENCY)
Facility: HOSPITAL | Age: 39
Discharge: HOME | End: 2024-01-12
Payer: MEDICAID

## 2024-01-12 VITALS
HEIGHT: 62 IN | SYSTOLIC BLOOD PRESSURE: 116 MMHG | OXYGEN SATURATION: 99 % | BODY MASS INDEX: 21.53 KG/M2 | RESPIRATION RATE: 18 BRPM | HEART RATE: 103 BPM | WEIGHT: 117 LBS | TEMPERATURE: 99 F | DIASTOLIC BLOOD PRESSURE: 68 MMHG

## 2024-01-12 DIAGNOSIS — N93.9 VAGINAL BLEEDING: Primary | ICD-10-CM

## 2024-01-12 DIAGNOSIS — R63.0 ANOREXIA: ICD-10-CM

## 2024-01-12 DIAGNOSIS — E87.6 HYPOKALEMIA: ICD-10-CM

## 2024-01-12 DIAGNOSIS — F43.9 STRESS AT HOME: ICD-10-CM

## 2024-01-12 LAB
ALBUMIN SERPL BCP-MCNC: 4.5 G/DL (ref 3.5–5)
ALBUMIN/GLOB SERPL: 1.3 {RATIO}
ALP SERPL-CCNC: 79 U/L (ref 37–98)
ALT SERPL W P-5'-P-CCNC: 28 U/L (ref 13–56)
AMPHET UR QL SCN: NEGATIVE
ANION GAP SERPL CALCULATED.3IONS-SCNC: 18 MMOL/L (ref 7–16)
ANISOCYTOSIS BLD QL SMEAR: ABNORMAL
AST SERPL W P-5'-P-CCNC: 21 U/L (ref 15–37)
BACTERIA VAG QL WET PREP: ABNORMAL /HPF
BARBITURATES UR QL SCN: POSITIVE
BENZODIAZ METAB UR QL SCN: NEGATIVE
BILIRUB SERPL-MCNC: 0.5 MG/DL (ref ?–1.2)
BILIRUB UR QL STRIP: ABNORMAL
BUN SERPL-MCNC: 14 MG/DL (ref 7–18)
BUN/CREAT SERPL: 22 (ref 6–20)
CALCIUM SERPL-MCNC: 9.3 MG/DL (ref 8.5–10.1)
CANNABINOIDS UR QL SCN: POSITIVE
CHLORIDE SERPL-SCNC: 102 MMOL/L (ref 98–107)
CLARITY UR: ABNORMAL
CLUE CELLS VAG QL WET PREP: ABNORMAL /HPF
CO2 SERPL-SCNC: 22 MMOL/L (ref 21–32)
COCAINE UR QL SCN: NEGATIVE
COLOR UR: YELLOW
CREAT SERPL-MCNC: 0.64 MG/DL (ref 0.55–1.02)
EGFR (NO RACE VARIABLE) (RUSH/TITUS): 117 ML/MIN/1.73M2
EOSINOPHIL NFR BLD MANUAL: 1 % (ref 1–4)
ERYTHROCYTE [DISTWIDTH] IN BLOOD BY AUTOMATED COUNT: 15.2 % (ref 11.5–14.5)
GLOBULIN SER-MCNC: 3.4 G/DL (ref 2–4)
GLUCOSE SERPL-MCNC: 75 MG/DL (ref 74–106)
GLUCOSE UR STRIP-MCNC: NEGATIVE MG/DL
HCG UR QL IA.RAPID: NEGATIVE
HCT VFR BLD AUTO: 41.3 % (ref 38–47)
HGB BLD-MCNC: 13.5 G/DL (ref 12–16)
HYPOCHROMIA BLD QL SMEAR: ABNORMAL
KETONES UR STRIP-SCNC: >=160 MG/DL
LEUKOCYTE ESTERASE UR QL STRIP: NEGATIVE
LYMPHOCYTES NFR BLD MANUAL: 50 % (ref 27–41)
MCH RBC QN AUTO: 24 PG (ref 27–31)
MCHC RBC AUTO-ENTMCNC: 32.7 G/DL (ref 32–36)
MCV RBC AUTO: 73.5 FL (ref 80–96)
MICROCYTES BLD QL SMEAR: ABNORMAL
MONOCYTES NFR BLD MANUAL: 6 % (ref 2–6)
MPC BLD CALC-MCNC: ABNORMAL G/DL
MUCOUS THREADS #/AREA URNS HPF: ABNORMAL /HPF
NEUTS SEG NFR BLD MANUAL: 43 % (ref 50–62)
NITRITE UR QL STRIP: NEGATIVE
NRBC BLD MANUAL-RTO: ABNORMAL %
OPIATES UR QL SCN: POSITIVE
OVALOCYTES BLD QL SMEAR: ABNORMAL
PCP UR QL SCN: NEGATIVE
PH UR STRIP: 6 PH UNITS
PLATELET # BLD AUTO: 222 K/UL (ref 150–400)
PLATELET MORPHOLOGY: ABNORMAL
POIKILOCYTOSIS BLD QL SMEAR: ABNORMAL
POTASSIUM SERPL-SCNC: 3.4 MMOL/L (ref 3.5–5.1)
PROT SERPL-MCNC: 7.9 G/DL (ref 6.4–8.2)
PROT UR QL STRIP: NEGATIVE
RBC # BLD AUTO: 5.62 M/UL (ref 4.2–5.4)
RBC # UR STRIP: ABNORMAL /UL
RBC #/AREA URNS HPF: ABNORMAL /HPF
RBC #/AREA VAG WET PREP: ABNORMAL /HPF
SODIUM SERPL-SCNC: 139 MMOL/L (ref 136–145)
SP GR UR STRIP: 1.02
SQUAMOUS #/AREA URNS LPF: ABNORMAL /LPF
SQUAMOUS EPITHELIALS WET WOUNT, GENITAL: ABNORMAL /HPF
T VAGINALIS VAG QL WET PREP: ABNORMAL /HPF
TARGETS BLD QL SMEAR: ABNORMAL
UROBILINOGEN UR STRIP-ACNC: 0.2 MG/DL
WBC # BLD AUTO: 6.8 K/UL (ref 4.5–11)
WBC #/AREA URNS HPF: ABNORMAL /HPF
WBC CLUMPS WET MOUNT, GENITAL: ABNORMAL /HPF
WBC VAG QL WET PREP: ABNORMAL /HPF
YEAST VAG QL WET PREP: ABNORMAL /HPF

## 2024-01-12 PROCEDURE — 87491 CHLMYD TRACH DNA AMP PROBE: CPT

## 2024-01-12 PROCEDURE — 96361 HYDRATE IV INFUSION ADD-ON: CPT

## 2024-01-12 PROCEDURE — 81003 URINALYSIS AUTO W/O SCOPE: CPT

## 2024-01-12 PROCEDURE — 80053 COMPREHEN METABOLIC PANEL: CPT

## 2024-01-12 PROCEDURE — 85025 COMPLETE CBC W/AUTO DIFF WBC: CPT

## 2024-01-12 PROCEDURE — 96360 HYDRATION IV INFUSION INIT: CPT

## 2024-01-12 PROCEDURE — 80307 DRUG TEST PRSMV CHEM ANLYZR: CPT

## 2024-01-12 PROCEDURE — 25000003 PHARM REV CODE 250

## 2024-01-12 PROCEDURE — 87210 SMEAR WET MOUNT SALINE/INK: CPT

## 2024-01-12 PROCEDURE — 99284 EMERGENCY DEPT VISIT MOD MDM: CPT | Mod: ,,,

## 2024-01-12 PROCEDURE — 81025 URINE PREGNANCY TEST: CPT

## 2024-01-12 PROCEDURE — 99284 EMERGENCY DEPT VISIT MOD MDM: CPT | Mod: 25

## 2024-01-12 RX ORDER — POTASSIUM CHLORIDE 20 MEQ/1
40 TABLET, EXTENDED RELEASE ORAL ONCE
Status: COMPLETED | OUTPATIENT
Start: 2024-01-12 | End: 2024-01-12

## 2024-01-12 RX ORDER — HYDROCODONE BITARTRATE AND ACETAMINOPHEN 10; 325 MG/1; MG/1
1 TABLET ORAL
COMMUNITY
End: 2024-02-13

## 2024-01-12 RX ORDER — SODIUM CHLORIDE 9 MG/ML
1000 INJECTION, SOLUTION INTRAVENOUS
Status: COMPLETED | OUTPATIENT
Start: 2024-01-12 | End: 2024-01-12

## 2024-01-12 RX ADMIN — SODIUM CHLORIDE 1000 ML: 9 INJECTION, SOLUTION INTRAVENOUS at 12:01

## 2024-01-12 RX ADMIN — POTASSIUM CHLORIDE 40 MEQ: 1500 TABLET, EXTENDED RELEASE ORAL at 01:01

## 2024-01-12 RX ADMIN — SODIUM CHLORIDE 1000 ML: 9 INJECTION, SOLUTION INTRAVENOUS at 10:01

## 2024-01-12 NOTE — ED PROVIDER NOTES
Encounter Date: 1/12/2024       History     Chief Complaint   Patient presents with    Vaginal Bleeding     X 5 days      Patient is a 38-year-old black female who presents to the emergency room with complaints of vaginal bleeding that began 5-6 days prior.  She reports that the bleeding began on Sunday.  She also reports that she did have intercourse on Saturday and was concern that this may have caused some tearing.  She is tachycardic with a heart rate of 116 but reports that she is currently wearing a cardiac monitor that was set up by her primary care provider.  She reports that that concern is chronic and unchanged.  She reports that she is typically treated with Klonopin for her tachycardia and anxiety.  She reports she has been out of this medication and can not obtain a refill for 2-3 more days.  Her blood pressure is 130/80, temperature is 98.6°, respirations 18, and oxygen saturation 98% on room air.  She appears in no acute distress.    The history is provided by the patient.     Review of patient's allergies indicates:  No Known Allergies  Past Medical History:   Diagnosis Date    Anxiety     Depression      Past Surgical History:   Procedure Laterality Date    BREAST SURGERY       History reviewed. No pertinent family history.  Social History     Tobacco Use    Smoking status: Every Day     Types: Cigarettes     Passive exposure: Current    Smokeless tobacco: Never   Substance Use Topics    Alcohol use: Yes     Comment: occasionally    Drug use: Not Currently     Frequency: 2.0 times per week     Types: Marijuana     Comment: last smoked 4 days ago     Review of Systems   Constitutional:  Negative for activity change, appetite change, chills and fever.   HENT:  Negative for congestion, sore throat and trouble swallowing.    Eyes: Negative.    Respiratory:  Negative for cough and shortness of breath.    Cardiovascular:  Negative for chest pain and palpitations.   Gastrointestinal:  Negative for  abdominal pain, constipation, diarrhea, nausea and vomiting.   Endocrine: Negative.    Genitourinary:  Positive for vaginal bleeding. Negative for dysuria, vaginal discharge and vaginal pain.   Musculoskeletal:  Negative for arthralgias, back pain, neck pain and neck stiffness.   Skin:  Negative for color change, pallor and rash.   Allergic/Immunologic: Negative.    Neurological:  Negative for dizziness, syncope, facial asymmetry, speech difficulty, weakness, light-headedness and headaches.   Hematological:  Does not bruise/bleed easily.   Psychiatric/Behavioral:  Negative for confusion. The patient is nervous/anxious.    All other systems reviewed and are negative.      Physical Exam     Initial Vitals [01/12/24 0943]   BP Pulse Resp Temp SpO2   130/80 (!) 116 18 98.6 °F (37 °C) 98 %      MAP       --         Physical Exam    Nursing note and vitals reviewed.  Constitutional: She appears well-developed and well-nourished. She is not diaphoretic. No distress.   HENT:   Head: Normocephalic and atraumatic.   Mouth/Throat: Oropharynx is clear and moist.   Eyes: Conjunctivae and EOM are normal. Pupils are equal, round, and reactive to light.   Neck: Neck supple.   Normal range of motion.  Cardiovascular:  Regular rhythm, normal heart sounds and normal pulses.   Tachycardia present.         Pulmonary/Chest: Breath sounds normal. No respiratory distress. She has no wheezes. She has no rhonchi. She has no rales. She exhibits no tenderness.   Abdominal: Abdomen is soft. Bowel sounds are normal. She exhibits no distension. There is no abdominal tenderness. There is no rebound and no guarding.   Genitourinary: Cervix exhibits no motion tenderness and no discharge. Right adnexum displays no tenderness. Left adnexum displays no tenderness.    Vaginal bleeding present.      No vaginal discharge, erythema or tenderness.   There is bleeding in the vagina. No erythema or tenderness in the vagina.    No foreign body in the vagina.       No signs of injury in the vagina.      Genitourinary Comments: Pelvic exam performed. Chaperone present- Vy Love RN. Mild inflammation and bleeding noted. Wet prep collected and sent. Patient tolerated well with no complications.     Musculoskeletal:         General: Normal range of motion.      Cervical back: Normal range of motion and neck supple.     Neurological: She is alert and oriented to person, place, and time. She has normal strength. GCS score is 15. GCS eye subscore is 4. GCS verbal subscore is 5. GCS motor subscore is 6.   Skin: Skin is warm and dry. Capillary refill takes less than 2 seconds.   Psychiatric: She has a normal mood and affect. Her behavior is normal. Judgment and thought content normal.         Medical Screening Exam   See Full Note    ED Course   Procedures  Labs Reviewed   WET PREP, GENITAL - Abnormal; Notable for the following components:       Result Value    WBC Wet Mount Rare (*)     RBC Wet Mount Few (*)     Squamous Epithelials Wet Mount Few (*)     All other components within normal limits   URINALYSIS, REFLEX TO URINE CULTURE - Abnormal; Notable for the following components:    Ketones, UA >=160 (*)     Bilirubin, UA Small (*)     Blood, UA Moderate (*)     All other components within normal limits   CBC WITH DIFFERENTIAL - Abnormal; Notable for the following components:    RBC 5.62 (*)     MCV 73.5 (*)     MCH 24.0 (*)     RDW 15.2 (*)     All other components within normal limits   URINALYSIS, MICROSCOPIC - Abnormal; Notable for the following components:    RBC, UA 3-5 (*)     Squamous Epithelial Cells, UA Few (*)     Mucus, UA Few (*)     All other components within normal limits   MANUAL DIFFERENTIAL - Abnormal; Notable for the following components:    Segmented Neutrophils, Man % 43 (*)     Lymphocytes, Man % 50 (*)     Platelet Morphology Few Large Platelets (*)     All other components within normal limits   COMPREHENSIVE METABOLIC PANEL - Abnormal; Notable  for the following components:    Potassium 3.4 (*)     Anion Gap 18 (*)     BUN/Creatinine Ratio 22 (*)     All other components within normal limits   HCG QUALITATIVE URINE - Normal   CHLAMYDIA/GONORRHOEAE(GC), PCR   CBC W/ AUTO DIFFERENTIAL    Narrative:     The following orders were created for panel order CBC auto differential.  Procedure                               Abnormality         Status                     ---------                               -----------         ------                     CBC with Differential[[id number]]       Abnormal            Final result               Manual Differential[[id number]]         Abnormal            Final result                 Please view results for these tests on the individual orders.   DRUG SCREEN, URINE (BEAKER)          Imaging Results    None          Medications   sodium chloride 0.9% bolus 1,000 mL 1,000 mL (0 mLs Intravenous Stopped 1/12/24 1114)   0.9%  NaCl infusion (1,000 mLs Intravenous New Bag 1/12/24 1243)   potassium chloride SA CR tablet 40 mEq (40 mEq Oral Given 1/12/24 1353)     Medical Decision Making  Patient presents to the ED for evaluation of vaginal bleeding x5 or 6 days.  She is tachycardic but otherwise hemodynamically stable.  She reports that the tachycardia is chronic and unchanged.  She appears in no acute distress.  We will send a urine sample for further evaluation.  We did obtain a catheterized sample.  It was negative for pregnancy test, negative for nitrites, negative for leukocytes, few mucus, 3-5 RBC, and moderate blood.  Also noted greater than 160 ketones and small bilirubin in her urine but otherwise unremarkable.  We did initiate IV access and obtain lab specimens for further evaluation.  Due to current equipment failures in our lab we are unable to obtain a chemistry at this time.  Recent labs from 10/24/2023 were referenced and noted to show normal electrolytes and normal kidney function at that time.  Pelvic exam was  performed and tolerated well.  There was some mild generalized bleeding noted and mild inflammation but no discharge or other abnormalities visualized.  She tolerated this well.  See physical exam for further details.  We did contact lab and make arrangements for a carrier to  the patient's labs and have them run at the [facility].  Patient also is now saying that she has had a decreased appetite over the past couple of days and has had to be forcing herself to eat.  She also reports chronically taking Klonopin and believes that her not having this medication in the past few days is what is making her not want to eat.  She does also report that she has been taking another person's gabapentin 300 mg daily over the past 2-3 days.  She was advised to never take medication that was not prescribed to her.  She does feel as if she can eat something now so we will provide her with a tray in the interim.  We will also initiate a [facility] telemedicine consult for expert consultation.    Amount and/or Complexity of Data Reviewed  Independent Historian:      Details: Patient is a 38-year-old black female who presents to the emergency room with complaints of vaginal bleeding that began 5-6 days prior.  She reports that the bleeding began on Sunday.  She also reports that she did have intercourse on Saturday and was concern that this may have caused some tearing.  She is tachycardic with a heart rate of 116 but reports that she is currently wearing a cardiac monitor that was set up by her primary care provider.  She reports that that concern is chronic and unchanged.  She reports that she is typically treated with Klonopin for her tachycardia and anxiety.  She reports she has been out of this medication and can not obtain a refill for 2-3 more days.  Her blood pressure is 130/80, temperature is 98.6°, respirations 18, and oxygen saturation 98% on room air.  She appears in no acute distress.  Labs: ordered.     Details:  Urinalysis shows small bilirubin, ketones greater than 160, and moderate blood.  Urine is negative for leukocytes and negative for nitrites.  CBC shows a WBC of 6.8, hemoglobin of 13.5, and hematocrit of 41.3.  Platelet count is 222.  Urine pregnancy test negative.  Wet prep shows rare WBC, few RBC, few squamous epithelial cells and otherwise unremarkable.  CMP shows a potassium at 3.4, anion gap 18, BUN/creatinine ratio of 22, normal creatinine of 0.64, normal  and otherwise unremarkable.  Discussion of management or test interpretation with external provider(s): Case was discussed with Dr. Mcnulty at [facility] via telemedicine consult.  As long as her creatinine does not show vast abnormalities he is in agreement with the plan of discharge in the patient and having her follow up with her regular doctor and OBGYN as scheduled.  This was discussed in detail with the patient along with stress reduction techniques in the importance of eating a well-balanced diet during the telemedicine consult.  She verbalizes understanding.  She is in agreement with this plan.    Risk  Prescription drug management.  Risk Details: Patient presents for emergent evaluation of acute vaginal bleeding that poses a threat to life and/or bodily function.    Final diagnoses:  [N93.9] Vaginal bleeding (Primary)  [R63.0] Anorexia  [E87.6] Hypokalemia  [F43.9] Stress at home  I ordered labs and personally reviewed them.  Labs significant for normal creatinine, normal hemoglobin and hematocrit.   Case was discussed with Dr. Mcnulty via [facility] telemedicine consult.  See above for further details.  Patient was managed in the ED with IV normal saline and p.o. potassium.    The response to treatment was improved.    Patient was discharged in stable condition.  Detailed return precautions discussed.                                       Clinical Impression:   Final diagnoses:  [N93.9] Vaginal bleeding (Primary)  [R63.0] Anorexia  [E87.6]  Hypokalemia  [F43.9] Stress at home        ED Disposition Condition    Discharge Stable          ED Prescriptions    None       Follow-up Information       Follow up With Specialties Details Why Contact Info    Ezra Morrison IV, DO Family Medicine Call on 1/15/2024  [address] [place] [address]  [phone]               Dano Millard, FNP  01/12/24 0990

## 2024-01-12 NOTE — DISCHARGE INSTRUCTIONS
Contact your regular doctor on Monday and follow up with them as planned on Tuesday.  Keep scheduled OBGYN follow-up as planned.  Recommend eating a well-balanced diet and eating regularly.  Increase fluids to maintain proper hydration.  Return to the emergency department for any new or worrisome symptoms.

## 2024-01-13 LAB
CHLAMYDIA BY PCR: NEGATIVE
N. GONORRHOEAE (GC) BY PCR: NEGATIVE

## 2024-01-17 ENCOUNTER — HOSPITAL ENCOUNTER (OUTPATIENT)
Dept: RADIOLOGY | Facility: HOSPITAL | Age: 39
Discharge: HOME | End: 2024-01-17
Attending: ADVANCED PRACTICE MIDWIFE
Payer: MEDICAID

## 2024-01-17 ENCOUNTER — OFFICE VISIT (OUTPATIENT)
Dept: OBSTETRICS AND GYNECOLOGY | Facility: CLINIC | Age: 39
End: 2024-01-17
Payer: MEDICAID

## 2024-01-17 VITALS
HEIGHT: 62 IN | WEIGHT: 116.13 LBS | HEART RATE: 68 BPM | SYSTOLIC BLOOD PRESSURE: 118 MMHG | BODY MASS INDEX: 21.37 KG/M2 | DIASTOLIC BLOOD PRESSURE: 79 MMHG

## 2024-01-17 DIAGNOSIS — N94.6 DYSMENORRHEA: ICD-10-CM

## 2024-01-17 DIAGNOSIS — R10.2 PELVIC PAIN: ICD-10-CM

## 2024-01-17 DIAGNOSIS — N92.1 MENORRHAGIA WITH IRREGULAR CYCLE: Primary | ICD-10-CM

## 2024-01-17 DIAGNOSIS — Z30.013 ENCOUNTER FOR INITIAL PRESCRIPTION OF INJECTABLE CONTRACEPTIVE: ICD-10-CM

## 2024-01-17 PROCEDURE — 3078F DIAST BP <80 MM HG: CPT | Mod: CPTII,,, | Performed by: ADVANCED PRACTICE MIDWIFE

## 2024-01-17 PROCEDURE — 3008F BODY MASS INDEX DOCD: CPT | Mod: CPTII,,, | Performed by: ADVANCED PRACTICE MIDWIFE

## 2024-01-17 PROCEDURE — 3074F SYST BP LT 130 MM HG: CPT | Mod: CPTII,,, | Performed by: ADVANCED PRACTICE MIDWIFE

## 2024-01-17 PROCEDURE — 99214 OFFICE O/P EST MOD 30 MIN: CPT | Mod: 25,,, | Performed by: ADVANCED PRACTICE MIDWIFE

## 2024-01-17 PROCEDURE — 76856 US EXAM PELVIC COMPLETE: CPT | Mod: TC

## 2024-01-17 PROCEDURE — 1159F MED LIST DOCD IN RCRD: CPT | Mod: CPTII,,, | Performed by: ADVANCED PRACTICE MIDWIFE

## 2024-01-17 PROCEDURE — 96372 THER/PROPH/DIAG INJ SC/IM: CPT | Mod: ,,, | Performed by: ADVANCED PRACTICE MIDWIFE

## 2024-01-17 RX ORDER — MEDROXYPROGESTERONE ACETATE 150 MG/ML
150 INJECTION, SUSPENSION INTRAMUSCULAR
Status: COMPLETED | OUTPATIENT
Start: 2024-01-17 | End: 2024-01-17

## 2024-01-17 RX ADMIN — MEDROXYPROGESTERONE ACETATE 150 MG: 150 INJECTION, SUSPENSION INTRAMUSCULAR at 12:01

## 2024-01-17 NOTE — PROGRESS NOTES
Subjective     Patient ID: Windy Alcocer is a 38 y.o. female.    Chief Complaint: Dysmenorrhea (Painful Menstruation started 3 weeks ago. Not Sexually Active./)    April is c/o heavy, irregular periods with severe cramping.  She is not currently sexually active and not on birth control.  She has been taking antibiotics and pain meds for a tooth infection.  She was seen in the ER 1/12/24 and her H & H was 13.5/41.3.  Her UDS was positive for opiates, barbiturates and marijuana.  She was tested for GC/CT and was negative.      Review of Systems   Constitutional:  Positive for fatigue.   HENT:  Positive for dental problem.         Tooth aches   Respiratory: Negative.     Cardiovascular: Negative.    Gastrointestinal: Negative.    Genitourinary:  Positive for menstrual irregularity, menstrual problem, pelvic pain and vaginal bleeding.   Neurological: Negative.    Psychiatric/Behavioral:  The patient is nervous/anxious.           Objective     Physical Exam  Vitals and nursing note reviewed.   Constitutional:       Appearance: She is normal weight.   HENT:      Head: Normocephalic.      Nose: Nose normal.   Eyes:      Extraocular Movements: Extraocular movements intact.   Cardiovascular:      Rate and Rhythm: Normal rate.   Pulmonary:      Effort: Pulmonary effort is normal.   Abdominal:      General: Abdomen is flat.      Palpations: Abdomen is soft. There is no mass.      Tenderness: There is abdominal tenderness. There is guarding and rebound.   Musculoskeletal:      Right lower leg: No edema.      Left lower leg: No edema.   Skin:     General: Skin is warm and dry.   Neurological:      Mental Status: She is alert and oriented to person, place, and time.   Psychiatric:         Mood and Affect: Mood normal.         Behavior: Behavior normal.          Assessment and Plan     1. Menorrhagia with irregular cycle    2. Dysmenorrhea  -     medroxyPROGESTERone (DEPO-PROVERA) injection 150 mg  -     US Pelvis Complete Non OB;  Future; Expected date: 01/17/2024    3. Encounter for initial prescription of injectable contraceptive  -     medroxyPROGESTERone (DEPO-PROVERA) injection 150 mg    4. Pelvic pain      Plan:  Depo Provera given today  Pelvic u/s scheduled  Return in 2 week for gyn exam  Get records of [facility] pap [facility] at [facility]  ACHES discussed  Rx for Ibuprofen  Use a condom if she has sex           Follow up in about 2 weeks (around 1/31/2024).

## 2024-01-19 LAB
OHS CV EVENT MONITOR DAY: 0
OHS CV HOLTER LENGTH DECIMAL HOURS: 48
OHS CV HOLTER LENGTH HOURS: 48
OHS CV HOLTER LENGTH MINUTES: 0
OHS CV HOLTER SINUS AVERAGE HR: 90
OHS CV HOLTER SINUS MAX HR: 174
OHS CV HOLTER SINUS MIN HR: 59

## 2024-01-19 PROCEDURE — 93227 XTRNL ECG REC<48 HR R&I: CPT | Mod: ,,, | Performed by: INTERNAL MEDICINE

## 2024-01-22 ENCOUNTER — PATIENT MESSAGE (OUTPATIENT)
Dept: FAMILY MEDICINE | Facility: CLINIC | Age: 39
End: 2024-01-22
Payer: MEDICAID

## 2024-01-22 DIAGNOSIS — R00.2 PALPITATIONS: Primary | ICD-10-CM

## 2024-02-06 ENCOUNTER — OFFICE VISIT (OUTPATIENT)
Dept: CARDIOLOGY | Facility: CLINIC | Age: 39
End: 2024-02-06
Payer: MEDICAID

## 2024-02-06 VITALS
WEIGHT: 111.38 LBS | OXYGEN SATURATION: 98 % | HEART RATE: 119 BPM | SYSTOLIC BLOOD PRESSURE: 118 MMHG | HEIGHT: 62 IN | DIASTOLIC BLOOD PRESSURE: 74 MMHG | BODY MASS INDEX: 20.5 KG/M2

## 2024-02-06 DIAGNOSIS — F41.9 ANXIETY: ICD-10-CM

## 2024-02-06 DIAGNOSIS — E07.9 THYROID DISEASE: Primary | ICD-10-CM

## 2024-02-06 DIAGNOSIS — R00.2 PALPITATIONS: ICD-10-CM

## 2024-02-06 DIAGNOSIS — I47.19 AVNRT (AV NODAL RE-ENTRY TACHYCARDIA): ICD-10-CM

## 2024-02-06 PROCEDURE — 1159F MED LIST DOCD IN RCRD: CPT | Mod: CPTII,,, | Performed by: HOSPITALIST

## 2024-02-06 PROCEDURE — 93005 ELECTROCARDIOGRAM TRACING: CPT | Mod: PBBFAC | Performed by: HOSPITALIST

## 2024-02-06 PROCEDURE — 3078F DIAST BP <80 MM HG: CPT | Mod: CPTII,,, | Performed by: HOSPITALIST

## 2024-02-06 PROCEDURE — 3008F BODY MASS INDEX DOCD: CPT | Mod: CPTII,,, | Performed by: HOSPITALIST

## 2024-02-06 PROCEDURE — 93010 ELECTROCARDIOGRAM REPORT: CPT | Mod: S$PBB,,, | Performed by: HOSPITALIST

## 2024-02-06 PROCEDURE — 99205 OFFICE O/P NEW HI 60 MIN: CPT | Mod: S$PBB,,, | Performed by: HOSPITALIST

## 2024-02-06 PROCEDURE — 99214 OFFICE O/P EST MOD 30 MIN: CPT | Mod: PBBFAC | Performed by: HOSPITALIST

## 2024-02-06 PROCEDURE — 3074F SYST BP LT 130 MM HG: CPT | Mod: CPTII,,, | Performed by: HOSPITALIST

## 2024-02-06 RX ORDER — CLONAZEPAM 1 MG/1
1 TABLET ORAL 2 TIMES DAILY
Qty: 14 TABLET | Refills: 0 | Status: SHIPPED | OUTPATIENT
Start: 2024-02-06 | End: 2024-02-13

## 2024-02-06 RX ORDER — METOPROLOL SUCCINATE 25 MG/1
25 TABLET, EXTENDED RELEASE ORAL DAILY
Qty: 90 TABLET | Refills: 3 | Status: SHIPPED | OUTPATIENT
Start: 2024-02-06 | End: 2025-02-05

## 2024-02-06 NOTE — PROGRESS NOTES
"PCP: Ezra Morrison IV, DO    Referring Provider:     Subjective:   Windy Alcocer is a 38 y.o. female with hx of recent sibling passing, acute adjustment disorder, and episodic panic episodes that have triggered SVT runs >160 bpm - who presents for evaluation following ER visit - see assessment and plan        Fhx: mother and brother both have enlarged heart (patient has prior had LVH by voltage criteria)  Shx: current smoker - had lengthy discussion - patient not in place to quit at this point, however will discuss at follow-up     EKG - sinus tach    ECHO - No results found for this or any previous visit.      CATH - No results found for this or any previous visit.      Stress -     Lab Results   Component Value Date     01/12/2024    K 3.4 (L) 01/12/2024     01/12/2024    CO2 22 01/12/2024    BUN 14 01/12/2024    CREATININE 0.64 01/12/2024    CALCIUM 9.3 01/12/2024    ANIONGAP 18 (H) 01/12/2024       No results found for: "CHOL"  No results found for: "HDL"  No results found for: "LDLCALC"  No results found for: "TRIG"  No results found for: "CHOLHDL"    Lab Results   Component Value Date    WBC 6.80 01/12/2024    HGB 13.5 01/12/2024    HCT 41.3 01/12/2024    MCV 73.5 (L) 01/12/2024     01/12/2024           Current Outpatient Medications:     busPIRone (BUSPAR) 15 MG tablet, Take 1 tablet (15 mg total) by mouth 3 (three) times daily. for 5 days, Disp: 15 tablet, Rfl: 0    citalopram (CELEXA) 10 MG tablet, Take 1 tablet (10 mg total) by mouth once daily. (Patient not taking: Reported on 2/6/2024), Disp: 30 tablet, Rfl: 2    clonazePAM (KLONOPIN) 1 MG tablet, Take 1 tablet (1 mg total) by mouth 2 (two) times daily. for 7 days, Disp: 14 tablet, Rfl: 0    HYDROcodone-acetaminophen (NORCO)  mg per tablet, Take 1 tablet by mouth., Disp: , Rfl:     metoprolol succinate (TOPROL-XL) 25 MG 24 hr tablet, Take 1 tablet (25 mg total) by mouth once daily., Disp: 90 tablet, Rfl: 3    12-pt Review of " Pt is not able to answer adm questions "System negative aside from as mentioned in HPI, below, and A/P      Objective:   /74 (BP Location: Right arm, Patient Position: Sitting)   Pulse (!) 119   Ht 5' 2" (1.575 m)   Wt 50.5 kg (111 lb 6.4 oz)   LMP 01/01/2024 (Exact Date) Comment: Had period in Jan 1 -5 and then started bleeding on 1-7 til now  SpO2 98%   BMI 20.38 kg/m²     Physical Exam:  Gen:NAD  HEENT: NC,AT  Chest: normal respiratory effort  CV: mod sinus tach, no m/g/r  Abd: flat  Ext: no edema  Skin: no visible rash  Neuro: CNGI  Psych: AMAA  : not assessed        Assessment:     1. Palpitations  Ambulatory referral/consult to Cardiology    EKG 12-lead      2. 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   -TTE to r/o structural disease   -Will empirically start trial of Lopressor XL (metoprolol succinate 25mg po daily)- convert to Dilt or Verap if no structural heart disease and normal EF   -history of thyroid disease in family, will check TSH reflex FT4    Anxiety  -patient is describing episodes quite similar to panic attacks - as in worrying about the attacks also precipitates them along w/ palpitations  -gave patient short duration of medication as she is due for her refill in the next few days and has been having more stress than usual given her sister passing and her children at home    Hypothyroidism/hyperthyroidism  -check TSH w/ reflex T4    Smoking cessation  -next visit - discussed nicorette, provided info, patient not ready to quit, but will avoid caffeine and try to decrease cigarette smoking        "

## 2024-02-11 LAB
OHS QRS DURATION: 72 MS
OHS QTC CALCULATION: 440 MS

## 2024-02-12 ENCOUNTER — PATIENT MESSAGE (OUTPATIENT)
Dept: FAMILY MEDICINE | Facility: CLINIC | Age: 39
End: 2024-02-12
Payer: MEDICAID

## 2024-02-13 ENCOUNTER — OFFICE VISIT (OUTPATIENT)
Dept: FAMILY MEDICINE | Facility: CLINIC | Age: 39
End: 2024-02-13
Payer: MEDICAID

## 2024-02-13 VITALS
HEART RATE: 112 BPM | WEIGHT: 113 LBS | BODY MASS INDEX: 20.8 KG/M2 | HEIGHT: 62 IN | DIASTOLIC BLOOD PRESSURE: 75 MMHG | SYSTOLIC BLOOD PRESSURE: 114 MMHG

## 2024-02-13 DIAGNOSIS — F41.9 ANXIETY: Chronic | ICD-10-CM

## 2024-02-13 DIAGNOSIS — Z79.899 HIGH RISK MEDICATION USE: Primary | ICD-10-CM

## 2024-02-13 PROCEDURE — 80305 DRUG TEST PRSMV DIR OPT OBS: CPT | Mod: RHCUB | Performed by: FAMILY MEDICINE

## 2024-02-13 PROCEDURE — 3008F BODY MASS INDEX DOCD: CPT | Mod: CPTII,,, | Performed by: FAMILY MEDICINE

## 2024-02-13 PROCEDURE — 1159F MED LIST DOCD IN RCRD: CPT | Mod: CPTII,,, | Performed by: FAMILY MEDICINE

## 2024-02-13 PROCEDURE — 3074F SYST BP LT 130 MM HG: CPT | Mod: CPTII,,, | Performed by: FAMILY MEDICINE

## 2024-02-13 PROCEDURE — 99214 OFFICE O/P EST MOD 30 MIN: CPT | Mod: ,,, | Performed by: FAMILY MEDICINE

## 2024-02-13 PROCEDURE — 3078F DIAST BP <80 MM HG: CPT | Mod: CPTII,,, | Performed by: FAMILY MEDICINE

## 2024-02-13 RX ORDER — CLONAZEPAM 1 MG/1
TABLET ORAL
Qty: 75 TABLET | Refills: 2 | Status: SHIPPED | OUTPATIENT
Start: 2024-02-13 | End: 2024-04-18 | Stop reason: SDUPTHER

## 2024-02-13 NOTE — PROGRESS NOTES
"              Ezra Morrison IV, DO  [facility]  [address] [place] [address]  Phone: [phone]      Subjective     Name: Windy Alcocer   Sex: female  YOB: 1985 (38 y.o.)  MRN: [id number]  Visit Date: 02/13/2024   Chief Complaint: Medication Refill        HISTORY OF PRESENT ILLNESS:    Chief Complaint   Patient presents with    Medication Refill       HPI  See tests that keep you healthy and the problem oriented documentation below.    Tests to Keep You Healthy    Cervical Cancer Screening: Met on 8/24/2023  Tobacco Cessation: NO      Portions of this note may have been created with voice recognition software. Occasional "wrong-word" or "sound-a-like" substitutions may have occurred due to the inherent limitations of voice recognition software. Please, read the note carefully and recognize, using context, where substitutions have occurred.     PAST MEDICAL HISTORY:  Significant Diagnoses - Patient  has a past medical history of Anxiety and Depression.  Medications - Patient has a current medication list which includes the following long-term medication(s): clonazepam and metoprolol succinate.   Allergies - Patient has No Known Allergies.  Surgeries - Patient  has a past surgical history that includes Breast surgery.  Family History - Patient family history is not on file.      SOCIAL HISTORY:  Tobacco - Patient  reports that she has been smoking cigarettes. She has been exposed to tobacco smoke. She has never used smokeless tobacco.   Alcohol - Patient  reports current alcohol use.   Recreational Drugs - Patient  reports that she does not currently use drugs after having used the following drugs: Marijuana. Frequency: 2.00 times per week.       Review of Systems   All other systems reviewed and are negative.       Past Medical History:   Diagnosis Date    Anxiety     Depression         Review of patient's allergies indicates:  No Known Allergies     Past Surgical " "History:   Procedure Laterality Date    BREAST SURGERY          History reviewed. No pertinent family history.    Current Outpatient Medications   Medication Instructions    clonazePAM (KLONOPIN) 1 MG tablet Take 1 tablet (1 mg total) by mouth once daily AND 1.5 tablets (1.5 mg total) every evening.    metoprolol succinate (TOPROL-XL) 25 mg, Oral, Daily        Objective     /75   Pulse (!) 112   Ht 5' 2" (1.575 m)   Wt 51.3 kg (113 lb)   BMI 20.67 kg/m²     Physical Exam  Constitutional:       General: She is not in acute distress.     Appearance: Normal appearance. She is not ill-appearing.   HENT:      Head: Normocephalic and atraumatic.      Right Ear: External ear normal.      Left Ear: External ear normal.   Eyes:      Extraocular Movements: Extraocular movements intact.      Conjunctiva/sclera: Conjunctivae normal.   Cardiovascular:      Rate and Rhythm: Normal rate.      Heart sounds: No murmur heard.  Pulmonary:      Effort: Pulmonary effort is normal.   Musculoskeletal:      Cervical back: Normal range of motion.   Skin:     General: Skin is warm and dry.      Coloration: Skin is not jaundiced.      Findings: No rash.   Neurological:      Mental Status: She is alert.   Psychiatric:         Mood and Affect: Mood normal.        All recently obtained labs have been reviewed and discussed in detail with the patient.   Assessment     1. High risk medication use    2. Anxiety         Plan        Problem List Items Addressed This Visit          Psychiatric    Anxiety (Chronic)     Chronic problem with exacerbation of symptoms.  Patient's previous prescription was canceled when she received a 7 day supply additional medication from a different provider.  I did talk with the patient at length about this today.  I am willing to increase her dosage by 0.5 mg per day for this acute exacerbation.  We will be issuing a new prescription for this.  Presumptive drug screen in the office today was has " expected.         Relevant Medications    clonazePAM (KLONOPIN) 1 MG tablet     Other Visit Diagnoses       High risk medication use    -  Primary    Relevant Orders    POCT Urine Drug Screen Presump (Completed)            No follow-ups on file.    Patient advised that is symptoms worsen, they should call or report directly to local emergency department.    Ezra Morrison DO  [facility]

## 2024-02-13 NOTE — ASSESSMENT & PLAN NOTE
Chronic problem with exacerbation of symptoms.  Patient's previous prescription was canceled when she received a 7 day supply additional medication from a different provider.  I did talk with the patient at length about this today.  I am willing to increase her dosage by 0.5 mg per day for this acute exacerbation.  We will be issuing a new prescription for this.  Presumptive drug screen in the office today was has expected.

## 2024-02-21 ENCOUNTER — HOSPITAL ENCOUNTER (OUTPATIENT)
Dept: CARDIOLOGY | Facility: HOSPITAL | Age: 39
Discharge: HOME | End: 2024-02-21
Attending: HOSPITALIST
Payer: MEDICAID

## 2024-02-21 DIAGNOSIS — R00.2 PALPITATIONS: ICD-10-CM

## 2024-02-21 PROCEDURE — 93306 TTE W/DOPPLER COMPLETE: CPT | Mod: 26,,, | Performed by: HOSPITALIST

## 2024-02-21 PROCEDURE — 93306 TTE W/DOPPLER COMPLETE: CPT

## 2024-02-27 ENCOUNTER — PATIENT MESSAGE (OUTPATIENT)
Dept: CARDIOLOGY | Facility: CLINIC | Age: 39
End: 2024-02-27
Payer: MEDICAID

## 2024-02-29 ENCOUNTER — PATIENT MESSAGE (OUTPATIENT)
Dept: FAMILY MEDICINE | Facility: CLINIC | Age: 39
End: 2024-02-29
Payer: MEDICAID

## 2024-03-04 ENCOUNTER — PATIENT MESSAGE (OUTPATIENT)
Dept: FAMILY MEDICINE | Facility: CLINIC | Age: 39
End: 2024-03-04
Payer: MEDICAID

## 2024-03-04 ENCOUNTER — OFFICE VISIT (OUTPATIENT)
Dept: FAMILY MEDICINE | Facility: CLINIC | Age: 39
End: 2024-03-04
Payer: MEDICAID

## 2024-03-04 VITALS
HEIGHT: 62 IN | WEIGHT: 116 LBS | DIASTOLIC BLOOD PRESSURE: 69 MMHG | HEART RATE: 96 BPM | BODY MASS INDEX: 21.35 KG/M2 | SYSTOLIC BLOOD PRESSURE: 107 MMHG

## 2024-03-04 DIAGNOSIS — J02.9 SORE THROAT: Primary | ICD-10-CM

## 2024-03-04 DIAGNOSIS — Z20.9 EXPOSURE TO COMMUNICABLE DISEASE: ICD-10-CM

## 2024-03-04 DIAGNOSIS — R00.2 PALPITATIONS: ICD-10-CM

## 2024-03-04 LAB
CTP QC/QA: YES
CTP QC/QA: YES
MOLECULAR STREP A: NEGATIVE
POC MOLECULAR INFLUENZA A AGN: NEGATIVE
POC MOLECULAR INFLUENZA B AGN: NEGATIVE

## 2024-03-04 PROCEDURE — 99214 OFFICE O/P EST MOD 30 MIN: CPT | Mod: ,,, | Performed by: FAMILY MEDICINE

## 2024-03-04 PROCEDURE — 3008F BODY MASS INDEX DOCD: CPT | Mod: CPTII,,, | Performed by: FAMILY MEDICINE

## 2024-03-04 PROCEDURE — 3078F DIAST BP <80 MM HG: CPT | Mod: CPTII,,, | Performed by: FAMILY MEDICINE

## 2024-03-04 PROCEDURE — 1159F MED LIST DOCD IN RCRD: CPT | Mod: CPTII,,, | Performed by: FAMILY MEDICINE

## 2024-03-04 PROCEDURE — 87651 STREP A DNA AMP PROBE: CPT | Mod: RHCUB | Performed by: FAMILY MEDICINE

## 2024-03-04 PROCEDURE — 87502 INFLUENZA DNA AMP PROBE: CPT | Mod: RHCUB | Performed by: FAMILY MEDICINE

## 2024-03-04 PROCEDURE — 3074F SYST BP LT 130 MM HG: CPT | Mod: CPTII,,, | Performed by: FAMILY MEDICINE

## 2024-03-04 RX ORDER — AMOXICILLIN AND CLAVULANATE POTASSIUM 875; 125 MG/1; MG/1
1 TABLET, FILM COATED ORAL EVERY 12 HOURS
Qty: 14 TABLET | Refills: 0 | Status: SHIPPED | OUTPATIENT
Start: 2024-03-04 | End: 2024-03-12

## 2024-03-05 ENCOUNTER — PATIENT MESSAGE (OUTPATIENT)
Dept: CARDIOLOGY | Facility: CLINIC | Age: 39
End: 2024-03-05
Payer: MEDICAID

## 2024-03-05 NOTE — PROGRESS NOTES
"              Ezra Morrison IV, DO  [facility]  [address], [place] [address]  Phone: [phone]      Subjective     Name: Windy Alcocer   Sex: female  YOB: 1985 (38 y.o.)  MRN: [id number]  Visit Date: 03/05/2024   Chief Complaint: Sore Throat, Headache, and Cough        HISTORY OF PRESENT ILLNESS:    Chief Complaint   Patient presents with    Sore Throat    Headache    Cough       HPI  See tests that keep you healthy and the problem oriented documentation below.    Tests to Keep You Healthy    Cervical Cancer Screening: Met on 8/24/2023  Tobacco Cessation: NO      Portions of this note may have been created with voice recognition software. Occasional "wrong-word" or "sound-a-like" substitutions may have occurred due to the inherent limitations of voice recognition software. Please, read the note carefully and recognize, using context, where substitutions have occurred.     PAST MEDICAL HISTORY:  Significant Diagnoses - Patient  has a past medical history of Anxiety and Depression.  Medications - Patient has a current medication list which includes the following long-term medication(s): clonazepam and metoprolol succinate.   Allergies - Patient has No Known Allergies.  Surgeries - Patient  has a past surgical history that includes Breast surgery.  Family History - Patient family history is not on file.      SOCIAL HISTORY:  Tobacco - Patient  reports that she has been smoking cigarettes. She has been exposed to tobacco smoke. She has never used smokeless tobacco.   Alcohol - Patient  reports current alcohol use.   Recreational Drugs - Patient  reports that she does not currently use drugs after having used the following drugs: Marijuana. Frequency: 2.00 times per week.       Review of Systems   All other systems reviewed and are negative.       Past Medical History:   Diagnosis Date    Anxiety     Depression         Review of patient's allergies indicates:  No Known " "Allergies     Past Surgical History:   Procedure Laterality Date    BREAST SURGERY          History reviewed. No pertinent family history.    Current Outpatient Medications   Medication Instructions    amoxicillin-clavulanate 875-125mg (AUGMENTIN) 875-125 mg per tablet 1 tablet, Oral, Every 12 hours    clonazePAM (KLONOPIN) 1 MG tablet Take 1 tablet (1 mg total) by mouth once daily AND 1.5 tablets (1.5 mg total) every evening.    metoprolol succinate (TOPROL-XL) 25 mg, Oral, Daily        Objective     /69   Pulse 96   Ht 5' 2" (1.575 m)   Wt 52.6 kg (116 lb)   BMI 21.22 kg/m²     Physical Exam  Constitutional:       General: She is not in acute distress.     Appearance: Normal appearance. She is not ill-appearing.   HENT:      Head: Normocephalic and atraumatic.      Right Ear: External ear normal.      Left Ear: External ear normal.   Eyes:      Extraocular Movements: Extraocular movements intact.      Conjunctiva/sclera: Conjunctivae normal.   Cardiovascular:      Rate and Rhythm: Normal rate.      Heart sounds: No murmur heard.  Pulmonary:      Effort: Pulmonary effort is normal.   Musculoskeletal:      Cervical back: Normal range of motion.   Skin:     General: Skin is warm and dry.      Coloration: Skin is not jaundiced.      Findings: No rash.   Neurological:      Mental Status: She is alert.   Psychiatric:         Mood and Affect: Mood normal.        All recently obtained labs have been reviewed and discussed in detail with the patient.   Assessment     1. Sore throat    2. Exposure to communicable disease    3. Palpitations         Plan        Problem List Items Addressed This Visit          Cardiac/Vascular    Palpitations     Discussed at length patient's most recent visit with a cardiologist.  Pending results echo.  EP possible          Other Visit Diagnoses       Sore throat    -  Primary    Relevant Medications    amoxicillin-clavulanate 875-125mg (AUGMENTIN) 875-125 mg per tablet    Other " Relevant Orders    POCT Strep A, Molecular (Completed)    Exposure to communicable disease        Acute in the systemic symptoms including fever.    Relevant Orders    POCT Influenza A/B Molecular (Completed)            No follow-ups on file.    Patient advised that is symptoms worsen, they should call or report directly to local emergency department.    Ezra Morrison DO  [facility]

## 2024-03-05 NOTE — ASSESSMENT & PLAN NOTE
Discussed at length patient's most recent visit with a cardiologist.  Pending results echo.  EP possible

## 2024-03-11 ENCOUNTER — OFFICE VISIT (OUTPATIENT)
Dept: CARDIOLOGY | Facility: CLINIC | Age: 39
End: 2024-03-11
Payer: MEDICAID

## 2024-03-11 VITALS
HEIGHT: 62 IN | HEART RATE: 102 BPM | OXYGEN SATURATION: 98 % | BODY MASS INDEX: 22.01 KG/M2 | DIASTOLIC BLOOD PRESSURE: 64 MMHG | WEIGHT: 119.63 LBS | SYSTOLIC BLOOD PRESSURE: 104 MMHG

## 2024-03-11 DIAGNOSIS — Z01.818 PREOP EXAMINATION: ICD-10-CM

## 2024-03-11 DIAGNOSIS — I50.9 ACC/AHA STAGE C CONGESTIVE HEART FAILURE DUE TO ISCHEMIC CARDIOMYOPATHY: ICD-10-CM

## 2024-03-11 DIAGNOSIS — I25.5 ACC/AHA STAGE C CONGESTIVE HEART FAILURE DUE TO ISCHEMIC CARDIOMYOPATHY: ICD-10-CM

## 2024-03-11 DIAGNOSIS — I50.9 NEW ONSET OF CONGESTIVE HEART FAILURE: Primary | ICD-10-CM

## 2024-03-11 DIAGNOSIS — R00.2 PALPITATIONS: ICD-10-CM

## 2024-03-11 DIAGNOSIS — I20.89 ATYPICAL ANGINA: ICD-10-CM

## 2024-03-11 LAB
AORTIC ROOT ANNULUS: 2.55 CM
AORTIC VALVE CUSP SEPERATION: 1.12 CM
AV INDEX (PROSTH): 0.54
AV MEAN GRADIENT: 3 MMHG
AV PEAK GRADIENT: 5 MMHG
AV VALVE AREA BY VELOCITY RATIO: 1.71 CM²
AV VALVE AREA: 1.48 CM²
AV VELOCITY RATIO: 0.62
CV ECHO LV RWT: 0.55 CM
DOP CALC AO PEAK VEL: 1.17 M/S
DOP CALC AO VTI: 21.4 CM
DOP CALC LVOT AREA: 2.7 CM2
DOP CALC LVOT DIAMETER: 1.87 CM
DOP CALC LVOT PEAK VEL: 0.73 M/S
DOP CALC LVOT STROKE VOLUME: 31.57 CM3
DOP CALCLVOT PEAK VEL VTI: 11.5 CM
E WAVE DECELERATION TIME: 218.74 MSEC
E/A RATIO: 1.18
E/E' RATIO: 9.67 M/S
ECHO LV POSTERIOR WALL: 0.84 CM (ref 0.6–1.1)
EJECTION FRACTION: 40 %
FRACTIONAL SHORTENING: -2 % (ref 28–44)
INTERVENTRICULAR SEPTUM: 0.55 CM (ref 0.6–1.1)
IVC DIAMETER: 2.34 CM
LEFT ATRIUM VOLUME MOD: 16.85 CM3
LEFT INTERNAL DIMENSION IN SYSTOLE: 3.12 CM (ref 2.1–4)
LEFT VENTRICLE DIASTOLIC VOLUME: 36.92 ML
LEFT VENTRICLE SYSTOLIC VOLUME: 38.39 ML
LEFT VENTRICULAR INTERNAL DIMENSION IN DIASTOLE: 3.07 CM (ref 3.5–6)
LEFT VENTRICULAR MASS: 50.34 G
LV LATERAL E/E' RATIO: 9.67 M/S
LV SEPTAL E/E' RATIO: 9.67 M/S
LVOT MG: 1.28 MMHG
LVOT MV: 0.54 CM/S
MV PEAK A VEL: 0.74 M/S
MV PEAK E VEL: 0.87 M/S
MV STENOSIS PRESSURE HALF TIME: 63.43 MS
MV VALVE AREA P 1/2 METHOD: 3.47 CM2
PISA MRMAX VEL: 1.07 M/S
PISA TR MAX VEL: 1.47 M/S
PV PEAK GRADIENT: 2 MMHG
PV PEAK VELOCITY: 0.72 M/S
RA PRESSURE ESTIMATED: 3 MMHG
RA VOL SYS: 19.21 ML
RIGHT ATRIAL AREA: 9.4 CM2
RIGHT VENTRICLE DIASTOLIC LENGTH: 5 CM
RIGHT VENTRICLE DIASTOLIC MID DIMENSION: 2.3 CM
RIGHT VENTRICULAR END-DIASTOLIC DIMENSION: 2.4 CM
RIGHT VENTRICULAR LENGTH IN DIASTOLE (APICAL 4-CHAMBER VIEW): 5.02 CM
RV MID DIAMA: 2.26 CM
RV TB RVSP: 4 MMHG
TDI LATERAL: 0.09 M/S
TDI SEPTAL: 0.09 M/S
TDI: 0.09 M/S
TR MAX PG: 9 MMHG
TRICUSPID ANNULAR PLANE SYSTOLIC EXCURSION: 2.17 CM
TV REST PULMONARY ARTERY PRESSURE: 12 MMHG

## 2024-03-11 PROCEDURE — 3008F BODY MASS INDEX DOCD: CPT | Mod: CPTII,,, | Performed by: HOSPITALIST

## 2024-03-11 PROCEDURE — 99213 OFFICE O/P EST LOW 20 MIN: CPT | Mod: PBBFAC | Performed by: HOSPITALIST

## 2024-03-11 PROCEDURE — 99215 OFFICE O/P EST HI 40 MIN: CPT | Mod: S$PBB,,, | Performed by: HOSPITALIST

## 2024-03-11 PROCEDURE — 1159F MED LIST DOCD IN RCRD: CPT | Mod: CPTII,,, | Performed by: HOSPITALIST

## 2024-03-11 PROCEDURE — 3078F DIAST BP <80 MM HG: CPT | Mod: CPTII,,, | Performed by: HOSPITALIST

## 2024-03-11 PROCEDURE — 3074F SYST BP LT 130 MM HG: CPT | Mod: CPTII,,, | Performed by: HOSPITALIST

## 2024-03-11 NOTE — PROGRESS NOTES
"PCP: Ezra Morrison IV, DO    Referring Provider:     Subjective:   Windy Alcocer is a 38 y.o. female with hx of recent sibling passing, acute adjustment disorder, and episodic panic episodes that have triggered SVT runs >160 bpm - who presents for evaluation following ER visit -      03/11/2024  Update - reviewed TTE with patient showing (newly reduced) EF 40%; discussed ischemic evaluation with patient and she is amenable - see assessment and plan      Results for orders placed during the hospital encounter of 02/21/24    Echo    Interpretation Summary    Left Ventricle: The left ventricle is smaller than normal. Wall is thinner than normal. Regional wall motion abnormalities present. Difficult to name precise segments, but mid-apical lateral wall segments There is moderately reduced systolic function. Ejection fraction by visual approximation is 40%. There is diastolic dysfunction but grade cannot be determined.    Right Ventricle: Normal right ventricular cavity size. Systolic function is normal.    Aortic Valve: Mildly restricted motion. Aortic valve peak velocity is 1.17 m/s. Mean gradient is 3 mmHg.    Pulmonary Artery: The estimated pulmonary artery systolic pressure is 12 mmHg.    IVC/SVC: Normal venous pressure at 3 mmHg.        Fhx: mother and brother both have enlarged heart (patient has prior had LVH by voltage criteria)  Shx: current smoker - had lengthy discussion - patient not in place to quit at this point, however will discuss at follow-up     EKG - sinus tach    ECHO - No results found for this or any previous visit.      CATH - No results found for this or any previous visit.      Stress -     Lab Results   Component Value Date     01/12/2024    K 3.4 (L) 01/12/2024     01/12/2024    CO2 22 01/12/2024    BUN 14 01/12/2024    CREATININE 0.64 01/12/2024    CALCIUM 9.3 01/12/2024    ANIONGAP 18 (H) 01/12/2024       No results found for: "CHOL"  No results found for: "HDL"  No results " "found for: "LDLCALC"  No results found for: "TRIG"  No results found for: "CHOLHDL"    Lab Results   Component Value Date    WBC 6.80 01/12/2024    HGB 13.5 01/12/2024    HCT 41.3 01/12/2024    MCV 73.5 (L) 01/12/2024     01/12/2024           Current Outpatient Medications:     amoxicillin-clavulanate 875-125mg (AUGMENTIN) 875-125 mg per tablet, Take 1 tablet by mouth every 12 (twelve) hours. for 7 days, Disp: 14 tablet, Rfl: 0    clonazePAM (KLONOPIN) 1 MG tablet, Take 1 tablet (1 mg total) by mouth once daily AND 1.5 tablets (1.5 mg total) every evening., Disp: 75 tablet, Rfl: 2    metoprolol succinate (TOPROL-XL) 25 MG 24 hr tablet, Take 1 tablet (25 mg total) by mouth once daily., Disp: 90 tablet, Rfl: 3    12-pt Review of System negative aside from as mentioned in HPI, below, and A/P      Objective:   Blood Pressure 104/64 (BP Location: Right arm, Patient Position: Sitting)   Pulse 102   Height 5' 2" (1.575 m)   Weight 54.3 kg (119 lb 9.6 oz)   Oxygen Saturation 98%   Body Mass Index 21.88 kg/m²     Physical Exam:  Gen:NAD  HEENT: NC,AT  Chest: normal respiratory effort  CV: mod sinus tach, no m/g/r  Abd: flat  Ext: no edema  Skin: no visible rash  Neuro: CNGI  Psych: AMAA  : not assessed        Assessment:   39yo F w hx of atypical angina, palpitations, smoking, recent TTE showing newly reduced EF 40% - patient is amenable to ischemic evaluation        Plan:       Atypical angina w/ newly reduced EF (presume ischemic cardiomyopathy)  -Risks, benefits and alternatives of the catheterization procedure were discussed with the patient.The risks of coronary angiography include but are not limited to: bleeding, infection, death, heart attack, arrhythmia, kidney injury or failure, potential need for dialysis, allergic reactions, stroke, need for emergency surgery, hematoma, pseudoaneurysm etc.  Should stenting be indicated, the patient has agreed to dual anti-platelet therapy for 1-consecutive year with " a drug-eluting stent and a minimum of 1-month with the use of a bare metal stent. Additionally, pt is aware that non-compliance is likely to result in stent clotting with heart attack, heart failure, and/or death  The risks of moderate sedation include hypotension, respiratory depression, arrhythmias, bronchospasm, and death. Informed consent was obtained and the  patient is agreeable to proceed with the procedure. Consent was placed on the chart.      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   -TTE to r/o structural disease   -Will empirically start trial of Lopressor XL (metoprolol succinate 25mg po daily)- convert to Dilt or Verap if no structural heart disease and normal EF   -history of thyroid disease in family, will check TSH reflex FT4    Anxiety  -patient is describing episodes quite similar to panic attacks - as in worrying about the attacks also precipitates them along w/ palpitations  -gave patient short duration of medication as she is due for her refill in the next few days and has been having more stress than usual given her sister passing and her children at home    Hypothyroidism/hyperthyroidism  -check TSH w/ reflex T4    Smoking cessation  -next visit - discussed nicorette, provided info, patient not ready to quit, but will avoid caffeine and try to decrease cigarette smoking    So

## 2024-03-12 ENCOUNTER — PATIENT MESSAGE (OUTPATIENT)
Dept: FAMILY MEDICINE | Facility: CLINIC | Age: 39
End: 2024-03-12
Payer: MEDICAID

## 2024-03-12 ENCOUNTER — OFFICE VISIT (OUTPATIENT)
Dept: FAMILY MEDICINE | Facility: CLINIC | Age: 39
End: 2024-03-12
Payer: MEDICAID

## 2024-03-12 VITALS
BODY MASS INDEX: 21.9 KG/M2 | HEIGHT: 62 IN | SYSTOLIC BLOOD PRESSURE: 99 MMHG | WEIGHT: 119 LBS | DIASTOLIC BLOOD PRESSURE: 62 MMHG | HEART RATE: 85 BPM

## 2024-03-12 DIAGNOSIS — Z71.2 ENCOUNTER TO DISCUSS TEST RESULTS: ICD-10-CM

## 2024-03-12 DIAGNOSIS — I50.42 CHRONIC COMBINED SYSTOLIC AND DIASTOLIC CONGESTIVE HEART FAILURE: Primary | ICD-10-CM

## 2024-03-12 DIAGNOSIS — D64.9 ANEMIA, UNSPECIFIED TYPE: ICD-10-CM

## 2024-03-12 LAB
FERRITIN SERPL-MCNC: 21 NG/ML (ref 8–252)
IRON SATN MFR SERPL: 36 % (ref 14–50)
IRON SERPL-MCNC: 111 ΜG/DL (ref 50–170)
TIBC SERPL-MCNC: 310 ΜG/DL (ref 250–450)

## 2024-03-12 PROCEDURE — 3074F SYST BP LT 130 MM HG: CPT | Mod: CPTII,,, | Performed by: FAMILY MEDICINE

## 2024-03-12 PROCEDURE — 83540 ASSAY OF IRON: CPT | Mod: ,,, | Performed by: CLINICAL MEDICAL LABORATORY

## 2024-03-12 PROCEDURE — 3008F BODY MASS INDEX DOCD: CPT | Mod: CPTII,,, | Performed by: FAMILY MEDICINE

## 2024-03-12 PROCEDURE — 83550 IRON BINDING TEST: CPT | Mod: ,,, | Performed by: CLINICAL MEDICAL LABORATORY

## 2024-03-12 PROCEDURE — 3078F DIAST BP <80 MM HG: CPT | Mod: CPTII,,, | Performed by: FAMILY MEDICINE

## 2024-03-12 PROCEDURE — 82728 ASSAY OF FERRITIN: CPT | Mod: ,,, | Performed by: CLINICAL MEDICAL LABORATORY

## 2024-03-12 PROCEDURE — 99214 OFFICE O/P EST MOD 30 MIN: CPT | Mod: ,,, | Performed by: FAMILY MEDICINE

## 2024-03-12 PROCEDURE — 1159F MED LIST DOCD IN RCRD: CPT | Mod: CPTII,,, | Performed by: FAMILY MEDICINE

## 2024-03-12 NOTE — PROGRESS NOTES
"              Ezra Morrison IV, DO  [facility]  [address] [place] [address]  Phone: [phone]      Subjective     Name: Windy Alcocer   Sex: female  YOB: 1985 (38 y.o.)  MRN: [id number]  Visit Date: 03/12/2024   Chief Complaint: Follow-up        HISTORY OF PRESENT ILLNESS:    Chief Complaint   Patient presents with    Follow-up       HPI  See tests that keep you healthy and the problem oriented documentation below.    Tests to Keep You Healthy    Cervical Cancer Screening: Met on 8/24/2023  Tobacco Cessation: NO      Portions of this note may have been created with voice recognition software. Occasional "wrong-word" or "sound-a-like" substitutions may have occurred due to the inherent limitations of voice recognition software. Please, read the note carefully and recognize, using context, where substitutions have occurred.     PAST MEDICAL HISTORY:  Significant Diagnoses - Patient  has a past medical history of Anxiety and Depression.  Medications - Patient has a current medication list which includes the following long-term medication(s): clonazepam and metoprolol succinate.   Allergies - Patient has No Known Allergies.  Surgeries - Patient  has a past surgical history that includes Breast surgery.  Family History - Patient family history is not on file.      SOCIAL HISTORY:  Tobacco - Patient  reports that she has been smoking cigarettes. She has been exposed to tobacco smoke. She has never used smokeless tobacco.   Alcohol - Patient  reports current alcohol use.   Recreational Drugs - Patient  reports that she does not currently use drugs after having used the following drugs: Marijuana. Frequency: 2.00 times per week.       Review of Systems       Past Medical History:   Diagnosis Date    Anxiety     Depression         Review of patient's allergies indicates:  No Known Allergies     Past Surgical History:   Procedure Laterality Date    BREAST SURGERY          History " "reviewed. No pertinent family history.    Current Outpatient Medications   Medication Instructions    amoxicillin-clavulanate 875-125mg (AUGMENTIN) 875-125 mg per tablet 1 tablet, Oral, Every 12 hours    clonazePAM (KLONOPIN) 1 MG tablet Take 1 tablet (1 mg total) by mouth once daily AND 1.5 tablets (1.5 mg total) every evening.    metoprolol succinate (TOPROL-XL) 25 mg, Oral, Daily        Objective     BP 99/62   Pulse 85   Ht 5' 2" (1.575 m)   Wt 54 kg (119 lb)   BMI 21.77 kg/m²     Physical Exam     All recently obtained labs have been reviewed and discussed in detail with the patient.   Assessment     1. Chronic combined systolic and diastolic congestive heart failure    2. Encounter to discuss test results    3. Anemia, unspecified type         Plan        Problem List Items Addressed This Visit          Oncology    Anemia (Chronic)    Relevant Orders    Ferritin    Iron and TIBC       Other    Encounter to discuss test results     Patient presents today to discuss test results done other health care provider.  We did review stress echocardiogram for which patient has combined systolic and diastolic dysfunction with reduced ejection fraction.  Dr. Guajardo has a catheterization procedure planned for later this month.  We also discussed patient's most recent complete blood count as well as basic metabolic panel.  We did discuss microcytic anemia as well as red cell distribution width.  We also discussed patient's hypoglycemia most recently as low as 64.  Recommend monitoring.            Other Visit Diagnoses       Chronic combined systolic and diastolic congestive heart failure    -  Primary            No follow-ups on file.    Patient advised that is symptoms worsen, they should call or report directly to local emergency department.    Ezra Morrison, DO  [facility]       "

## 2024-03-12 NOTE — ASSESSMENT & PLAN NOTE
Chronic problem with progression.  Patient's anemia had resolved approximately 1 year ago.  It is return to this time is microcytic in nature with anisocytosis present.  Recommend iron TIBC and ferritin.

## 2024-03-12 NOTE — ASSESSMENT & PLAN NOTE
Patient presents today to discuss test results done other health care provider.  We did review stress echocardiogram for which patient has combined systolic and diastolic dysfunction with reduced ejection fraction.  Dr. Guajardo has a catheterization procedure planned for later this month.  We also discussed patient's most recent complete blood count as well as basic metabolic panel.  We did discuss microcytic anemia as well as red cell distribution width.  We also discussed patient's hypoglycemia most recently as low as 64.  Recommend monitoring.

## 2024-03-13 ENCOUNTER — PATIENT MESSAGE (OUTPATIENT)
Dept: CARDIOLOGY | Facility: CLINIC | Age: 39
End: 2024-03-13
Payer: MEDICAID

## 2024-03-13 DIAGNOSIS — R94.39 ABNORMAL CARDIOVASCULAR STRESS TEST: Primary | ICD-10-CM

## 2024-03-13 RX ORDER — SODIUM CHLORIDE 0.9 % (FLUSH) 0.9 %
2 SYRINGE (ML) INJECTION
Status: CANCELLED | OUTPATIENT
Start: 2024-03-13

## 2024-03-16 ENCOUNTER — PATIENT MESSAGE (OUTPATIENT)
Dept: CARDIOLOGY | Facility: CLINIC | Age: 39
End: 2024-03-16
Payer: MEDICAID

## 2024-03-19 ENCOUNTER — PATIENT MESSAGE (OUTPATIENT)
Dept: CARDIOLOGY | Facility: HOSPITAL | Age: 39
End: 2024-03-19
Payer: MEDICAID

## 2024-03-19 ENCOUNTER — HOSPITAL ENCOUNTER (OUTPATIENT)
Facility: HOSPITAL | Age: 39
Discharge: HOME | End: 2024-03-19
Attending: HOSPITALIST | Admitting: HOSPITALIST
Payer: MEDICAID

## 2024-03-19 VITALS
TEMPERATURE: 98 F | RESPIRATION RATE: 18 BRPM | HEART RATE: 71 BPM | BODY MASS INDEX: 21.9 KG/M2 | WEIGHT: 119 LBS | DIASTOLIC BLOOD PRESSURE: 59 MMHG | SYSTOLIC BLOOD PRESSURE: 94 MMHG | HEIGHT: 62 IN | OXYGEN SATURATION: 100 %

## 2024-03-19 DIAGNOSIS — R93.1 ABNORMAL ECHOCARDIOGRAM: ICD-10-CM

## 2024-03-19 DIAGNOSIS — I25.5 ACC/AHA STAGE C CONGESTIVE HEART FAILURE DUE TO ISCHEMIC CARDIOMYOPATHY: ICD-10-CM

## 2024-03-19 DIAGNOSIS — R94.39 ABNORMAL CARDIOVASCULAR STRESS TEST: ICD-10-CM

## 2024-03-19 DIAGNOSIS — I20.89 ATYPICAL ANGINA: Primary | ICD-10-CM

## 2024-03-19 DIAGNOSIS — Z01.818 PREOP EXAMINATION: ICD-10-CM

## 2024-03-19 DIAGNOSIS — I50.9 ACC/AHA STAGE C CONGESTIVE HEART FAILURE DUE TO ISCHEMIC CARDIOMYOPATHY: ICD-10-CM

## 2024-03-19 PROCEDURE — 93005 ELECTROCARDIOGRAM TRACING: CPT

## 2024-03-19 PROCEDURE — 27201423 OPTIME MED/SURG SUP & DEVICES STERILE SUPPLY: Performed by: HOSPITALIST

## 2024-03-19 PROCEDURE — 93010 ELECTROCARDIOGRAM REPORT: CPT | Mod: ,,, | Performed by: HOSPITALIST

## 2024-03-19 PROCEDURE — 25500020 PHARM REV CODE 255: Performed by: HOSPITALIST

## 2024-03-19 PROCEDURE — C1894 INTRO/SHEATH, NON-LASER: HCPCS | Performed by: HOSPITALIST

## 2024-03-19 PROCEDURE — C1766 INTRO/SHEATH,STRBLE,NON-PEEL: HCPCS | Performed by: HOSPITALIST

## 2024-03-19 PROCEDURE — 93458 L HRT ARTERY/VENTRICLE ANGIO: CPT | Performed by: HOSPITALIST

## 2024-03-19 PROCEDURE — 63600175 PHARM REV CODE 636 W HCPCS: Performed by: HOSPITALIST

## 2024-03-19 PROCEDURE — 25000003 PHARM REV CODE 250: Performed by: HOSPITALIST

## 2024-03-19 PROCEDURE — 99153 MOD SED SAME PHYS/QHP EA: CPT | Performed by: HOSPITALIST

## 2024-03-19 PROCEDURE — 99152 MOD SED SAME PHYS/QHP 5/>YRS: CPT | Mod: ,,, | Performed by: HOSPITALIST

## 2024-03-19 PROCEDURE — 93458 L HRT ARTERY/VENTRICLE ANGIO: CPT | Mod: 26,,, | Performed by: HOSPITALIST

## 2024-03-19 PROCEDURE — 99152 MOD SED SAME PHYS/QHP 5/>YRS: CPT | Performed by: HOSPITALIST

## 2024-03-19 RX ORDER — FENTANYL CITRATE 50 UG/ML
INJECTION, SOLUTION INTRAMUSCULAR; INTRAVENOUS
Status: DISCONTINUED | OUTPATIENT
Start: 2024-03-19 | End: 2024-03-22 | Stop reason: HOSPADM

## 2024-03-19 RX ORDER — VERAPAMIL HYDROCHLORIDE 2.5 MG/ML
INJECTION, SOLUTION INTRAVENOUS
Status: DISCONTINUED | OUTPATIENT
Start: 2024-03-19 | End: 2024-03-22 | Stop reason: HOSPADM

## 2024-03-19 RX ORDER — SODIUM CHLORIDE 0.9 % (FLUSH) 0.9 %
2 SYRINGE (ML) INJECTION
Status: DISCONTINUED | OUTPATIENT
Start: 2024-03-19 | End: 2024-03-22 | Stop reason: HOSPADM

## 2024-03-19 RX ORDER — SODIUM CHLORIDE 9 MG/ML
INJECTION, SOLUTION INTRAVENOUS
Status: DISCONTINUED | OUTPATIENT
Start: 2024-03-19 | End: 2024-03-22 | Stop reason: HOSPADM

## 2024-03-19 RX ORDER — SODIUM CHLORIDE 9 MG/ML
INJECTION, SOLUTION INTRAVENOUS CONTINUOUS
Status: DISCONTINUED | OUTPATIENT
Start: 2024-03-19 | End: 2024-03-19 | Stop reason: HOSPADM

## 2024-03-19 RX ORDER — MIDAZOLAM HYDROCHLORIDE 1 MG/ML
INJECTION INTRAMUSCULAR; INTRAVENOUS
Status: DISCONTINUED | OUTPATIENT
Start: 2024-03-19 | End: 2024-03-22 | Stop reason: HOSPADM

## 2024-03-19 RX ORDER — HEPARIN SODIUM 5000 [USP'U]/ML
INJECTION, SOLUTION INTRAVENOUS; SUBCUTANEOUS
Status: DISCONTINUED | OUTPATIENT
Start: 2024-03-19 | End: 2024-03-22 | Stop reason: HOSPADM

## 2024-03-19 RX ORDER — LIDOCAINE HYDROCHLORIDE 10 MG/ML
INJECTION INFILTRATION; PERINEURAL
Status: DISCONTINUED | OUTPATIENT
Start: 2024-03-19 | End: 2024-03-22 | Stop reason: HOSPADM

## 2024-03-19 RX ORDER — NITROGLYCERIN 5 MG/ML
INJECTION, SOLUTION INTRAVENOUS
Status: DISCONTINUED | OUTPATIENT
Start: 2024-03-19 | End: 2024-03-22 | Stop reason: HOSPADM

## 2024-03-19 NOTE — BRIEF OP NOTE
Non-obstructive cors  Left-dominant circulation  Minor luminal irregularities only  R radial access, no complications

## 2024-03-19 NOTE — Clinical Note
The catheter was repositioned into the ostium   right coronary artery. An angiography was performed of the right coronary arteries. Multiple views were taken. [[email or web address]] : PT DEVELOPED  LEFT LOWER EYELID ERYTHEMA FEW DAYS AGO THAT IS GETTING WORSE WITH TENDERNESS AND EDEMA,NO D/C NO CONJUNCTIVA SX OR VISION CHANGES ,NO TRAUMA O R INJURY

## 2024-03-19 NOTE — Clinical Note
Report given to and site and distal pulse assessed angeles Briceno RN. Tr band securely in plaace s bleeding, swelling, or hematoma. Distal pulse +. Demonstrated to RN how to safely remove air from band as well as informed her of air removal protocol. Very receptive and attentive. Restated main points and objectives. Questions asked and answered. Verbalized understanding. Pt instructed not to use R arm for pushing, pulling, or lifting  X 5 days. Questions asked and answered. Verbalized understanding.

## 2024-03-19 NOTE — Clinical Note
The catheter was inserted into the, insertion attempt was made into the and was inserted over the wire into the ostium   left main.

## 2024-03-19 NOTE — Clinical Note
20 ml of contrast were injected throughout the case. 80 mL of contrast was the total wasted during the case. 100 mL was the total amount used during the case. The patients son Denice Sullivan called and will come as soon as he can. [phone].      Brett Amezquita RN  01/18/20 3524

## 2024-03-19 NOTE — Clinical Note
How Severe Is Your Skin Lesion?: moderate The patient was draped. Has Your Skin Lesion Been Treated?: not been treated Is This A New Presentation, Or A Follow-Up?: Growths

## 2024-03-19 NOTE — INTERVAL H&P NOTE
The patient has been examined and the H&P has been reviewed:    I concur with the findings and no changes have occurred since H&P was written.    Procedure risks, benefits and alternative options discussed and understood by patient/family.    Risks, benefits and alternatives of the catheterization procedure were discussed with the patient.The risks of coronary angiography include but are not limited to: bleeding, infection, death, heart attack, arrhythmia, kidney injury or failure, potential need for dialysis, allergic reactions, stroke, need for emergency surgery, hematoma, pseudoaneurysm etc.  Should stenting be indicated, the patient has agreed to dual anti-platelet therapy for 1-consecutive year with a drug-eluting stent and a minimum of 1-month with the use of a bare metal stent. Additionally, pt is aware that non-compliance is likely to result in stent clotting with heart attack, heart failure, and/or death  The risks of moderate sedation include hypotension, respiratory depression, arrhythmias, bronchospasm, and death. Informed consent was obtained and the  patient is agreeable to proceed with the procedure. Consent was placed on the chart.          There are no hospital problems to display for this patient.

## 2024-03-20 ENCOUNTER — PATIENT MESSAGE (OUTPATIENT)
Dept: CARDIOLOGY | Facility: CLINIC | Age: 39
End: 2024-03-20
Payer: MEDICAID

## 2024-03-25 NOTE — DISCHARGE SUMMARY
[facility] - Cath Lab  Discharge Note  Short Stay    Procedure(s) (LRB):  Left heart cath (N/A)      OUTCOME: Patient tolerated treatment/procedure well without complication and is now ready for discharge.    DISPOSITION: Home or Self Care    FINAL DIAGNOSIS:  non-obstructive coronary artery disease, HFrEF 2/2 NICM     FOLLOWUP: In clinic    DISCHARGE INSTRUCTIONS:    Discharge Procedure Orders   Cardiac rehab phase II   Standing Status: Future Standing Exp. Date: 03/19/25   Scheduling Instructions: [facility]     Order Specific Question Answer Comments   Department External - Other    Select qualifying diagnosis: Other - Specify    Other (Specify): HF w/ reduced EF 2/2 non-ischemic cardiomyopathy          Clinical Reference Documents Added to Patient Instructions         Document    CARDIAC CATHETERIZATION DISCHARGE INSTRUCTIONS (ENGLISH)    [facility] GEN RETURN TO WORK/SCHOOL            TIME SPENT ON DISCHARGE: 10 minutes

## 2024-03-26 ENCOUNTER — OFFICE VISIT (OUTPATIENT)
Dept: FAMILY MEDICINE | Facility: CLINIC | Age: 39
End: 2024-03-26
Payer: MEDICAID

## 2024-03-26 VITALS
SYSTOLIC BLOOD PRESSURE: 108 MMHG | HEIGHT: 62 IN | DIASTOLIC BLOOD PRESSURE: 71 MMHG | WEIGHT: 119 LBS | HEART RATE: 93 BPM | BODY MASS INDEX: 21.9 KG/M2

## 2024-03-26 DIAGNOSIS — Z71.6 TOBACCO ABUSE COUNSELING: ICD-10-CM

## 2024-03-26 DIAGNOSIS — Z71.2 ENCOUNTER TO DISCUSS TEST RESULTS: ICD-10-CM

## 2024-03-26 DIAGNOSIS — I25.5 ACC/AHA STAGE C CONGESTIVE HEART FAILURE DUE TO ISCHEMIC CARDIOMYOPATHY: Primary | ICD-10-CM

## 2024-03-26 DIAGNOSIS — I50.9 ACC/AHA STAGE C CONGESTIVE HEART FAILURE DUE TO ISCHEMIC CARDIOMYOPATHY: Primary | ICD-10-CM

## 2024-03-26 PROCEDURE — 99407 BEHAV CHNG SMOKING > 10 MIN: CPT | Mod: ,,, | Performed by: FAMILY MEDICINE

## 2024-03-26 PROCEDURE — 3078F DIAST BP <80 MM HG: CPT | Mod: CPTII,,, | Performed by: FAMILY MEDICINE

## 2024-03-26 PROCEDURE — 3074F SYST BP LT 130 MM HG: CPT | Mod: CPTII,,, | Performed by: FAMILY MEDICINE

## 2024-03-26 PROCEDURE — 99214 OFFICE O/P EST MOD 30 MIN: CPT | Mod: 25,,, | Performed by: FAMILY MEDICINE

## 2024-03-26 PROCEDURE — 3008F BODY MASS INDEX DOCD: CPT | Mod: CPTII,,, | Performed by: FAMILY MEDICINE

## 2024-03-26 PROCEDURE — 1159F MED LIST DOCD IN RCRD: CPT | Mod: CPTII,,, | Performed by: FAMILY MEDICINE

## 2024-03-26 PROCEDURE — 80061 LIPID PANEL: CPT | Mod: ,,, | Performed by: CLINICAL MEDICAL LABORATORY

## 2024-03-26 RX ORDER — MEDROXYPROGESTERONE ACETATE 150 MG/ML
150 INJECTION, SUSPENSION INTRAMUSCULAR
COMMUNITY

## 2024-03-26 NOTE — PROGRESS NOTES
"              Ezra Morrison IV, DO  [facility]  [address] [place] [address]  Phone: [phone]      Subjective     Name: Windy Alcocer   Sex: female  YOB: 1985 (38 y.o.)  MRN: [id number]  Visit Date: 03/26/2024   Chief Complaint: Follow-up (Wants to discuss results of recent cath)        HISTORY OF PRESENT ILLNESS:    Chief Complaint   Patient presents with    Follow-up     Wants to discuss results of recent cath       Tobacco Use/Cessation:  I assessed Windy Alcocer and discussed smoking cessation with her for 12 minutes. She reports that she has been smoking cigarettes. She has been exposed to tobacco smoke. She has never used smokeless tobacco.      See tests that keep you healthy and the problem oriented documentation below.    Tests to Keep You Healthy    Cervical Cancer Screening: Met on 8/24/2023  Tobacco Cessation: NO      Portions of this note may have been created with voice recognition software. Occasional "wrong-word" or "sound-a-like" substitutions may have occurred due to the inherent limitations of voice recognition software. Please, read the note carefully and recognize, using context, where substitutions have occurred.     PAST MEDICAL HISTORY:  Significant Diagnoses - Patient  has a past medical history of Anxiety and Depression.  Medications - Patient has a current medication list which includes the following long-term medication(s): clonazepam and metoprolol succinate.   Allergies - Patient has No Known Allergies.  Surgeries - Patient  has a past surgical history that includes Breast surgery and Left heart catheterization (N/A, 3/19/2024).  Family History - Patient family history is not on file.      SOCIAL HISTORY:  Tobacco - Patient  reports that she has been smoking cigarettes. She has been exposed to tobacco smoke. She has never used smokeless tobacco.   Alcohol - Patient  reports current alcohol use.   Recreational Drugs - Patient  reports current " "drug use. Frequency: 2.00 times per week. Drugs: Marijuana and Hydrocodone.       Review of Systems   All other systems reviewed and are negative.       Past Medical History:   Diagnosis Date    Anxiety     Depression         Review of patient's allergies indicates:  No Known Allergies     Past Surgical History:   Procedure Laterality Date    BREAST SURGERY      LEFT HEART CATHETERIZATION N/A 3/19/2024    Procedure: Left heart cath;  Surgeon: August Guajardo MD;  Location[facility] C[facility] LAB;  Service: Cardiology;  Laterality: N/A;        No family history on file.    Current Outpatient Medications   Medication Instructions    clonazePAM (KLONOPIN) 1 MG tablet Take 1 tablet (1 mg total) by mouth once daily AND 1.5 tablets (1.5 mg total) every evening.    medroxyPROGESTERone (DEPO-PROVERA) 150 mg, Intramuscular, Every 3 months    metoprolol succinate (TOPROL-XL) 25 mg, Oral, Daily        Objective     /71   Pulse 93   Ht 5' 2.4" (1.585 m)   Wt 54 kg (119 lb)   BMI 21.49 kg/m²     Physical Exam  Constitutional:       General: She is not in acute distress.     Appearance: Normal appearance. She is not ill-appearing.   HENT:      Head: Normocephalic and atraumatic.      Right Ear: External ear normal.      Left Ear: External ear normal.   Eyes:      Extraocular Movements: Extraocular movements intact.      Conjunctiva/sclera: Conjunctivae normal.   Cardiovascular:      Rate and Rhythm: Normal rate.      Heart sounds: No murmur heard.  Pulmonary:      Effort: Pulmonary effort is normal.   Musculoskeletal:      Cervical back: Normal range of motion.   Skin:     General: Skin is warm and dry.      Coloration: Skin is not jaundiced.      Findings: No rash.   Neurological:      Mental Status: She is alert.   Psychiatric:         Mood and Affect: Mood normal.        All recently obtained labs have been reviewed and discussed in detail with the patient.   Assessment     1. ACC/AHA stage C congestive heart " failure due to ischemic cardiomyopathy    2. Tobacco abuse counseling    3. Encounter to discuss test results         Plan        Problem List Items Addressed This Visit          Cardiac/Vascular    ACC/AHA stage C congestive heart failure due to ischemic cardiomyopathy - Primary    Relevant Orders    Lipid panel       Other    Encounter to discuss test results    Tobacco abuse counseling     The patient was counseled on the dangers of tobacco use, and was advised to quit.  Reviewed strategies to maximize success, including removing cigarettes and smoking materials from environment, stress management, substitution of other forms of reinforcement, support of family/friends, and written materials.           Relevant Orders    [30807] [place] TOBACCO USE CESSATION INTENSIVE >10 MIN       No follow-ups on file.    Patient advised that is symptoms worsen, they should call or report directly to local emergency department.    Ezra Morrison,   [facility]

## 2024-03-26 NOTE — PATIENT INSTRUCTIONS
Hi April,     If you are due for any health screening(s) below please notify me so we can arrange them to be ordered and scheduled to maintain your health. Most healthy patients complete it. Don't lose out on improving your health.     Tests to Keep You Healthy    Cervical Cancer Screening: Met on 8/24/2023  Tobacco Cessation: NO

## 2024-03-26 NOTE — ASSESSMENT & PLAN NOTE
The patient was counseled on the dangers of tobacco use, and was advised to quit.  Reviewed strategies to maximize success, including removing cigarettes and smoking materials from environment, stress management, substitution of other forms of reinforcement, support of family/friends, and written materials.

## 2024-03-27 ENCOUNTER — OFFICE VISIT (OUTPATIENT)
Dept: CARDIOLOGY | Facility: CLINIC | Age: 39
End: 2024-03-27
Payer: MEDICAID

## 2024-03-27 VITALS
WEIGHT: 121.19 LBS | SYSTOLIC BLOOD PRESSURE: 90 MMHG | OXYGEN SATURATION: 99 % | HEART RATE: 80 BPM | BODY MASS INDEX: 21.88 KG/M2 | DIASTOLIC BLOOD PRESSURE: 65 MMHG

## 2024-03-27 DIAGNOSIS — I50.30 ACC/AHA STAGE B DIASTOLIC HEART FAILURE: ICD-10-CM

## 2024-03-27 DIAGNOSIS — I50.9 ACC/AHA STAGE C CONGESTIVE HEART FAILURE DUE TO ISCHEMIC CARDIOMYOPATHY: Primary | ICD-10-CM

## 2024-03-27 DIAGNOSIS — M25.512 PAIN IN JOINT OF LEFT SHOULDER: ICD-10-CM

## 2024-03-27 DIAGNOSIS — R00.2 PALPITATIONS: ICD-10-CM

## 2024-03-27 DIAGNOSIS — Z71.6 TOBACCO ABUSE COUNSELING: ICD-10-CM

## 2024-03-27 DIAGNOSIS — I25.5 ACC/AHA STAGE C CONGESTIVE HEART FAILURE DUE TO ISCHEMIC CARDIOMYOPATHY: Primary | ICD-10-CM

## 2024-03-27 LAB
CHOLEST SERPL-MCNC: 165 MG/DL (ref 0–200)
CHOLEST/HDLC SERPL: 3.3 {RATIO}
HDLC SERPL-MCNC: 50 MG/DL (ref 40–60)
LDLC SERPL CALC-MCNC: 101 MG/DL
LDLC/HDLC SERPL: 2 {RATIO}
NONHDLC SERPL-MCNC: 115 MG/DL
TRIGL SERPL-MCNC: 68 MG/DL (ref 35–150)
VLDLC SERPL-MCNC: 14 MG/DL

## 2024-03-27 PROCEDURE — 99215 OFFICE O/P EST HI 40 MIN: CPT | Mod: S$PBB,,, | Performed by: HOSPITALIST

## 2024-03-27 PROCEDURE — 3008F BODY MASS INDEX DOCD: CPT | Mod: CPTII,,, | Performed by: HOSPITALIST

## 2024-03-27 PROCEDURE — 3078F DIAST BP <80 MM HG: CPT | Mod: CPTII,,, | Performed by: HOSPITALIST

## 2024-03-27 PROCEDURE — 99213 OFFICE O/P EST LOW 20 MIN: CPT | Mod: PBBFAC | Performed by: HOSPITALIST

## 2024-03-27 PROCEDURE — 3074F SYST BP LT 130 MM HG: CPT | Mod: CPTII,,, | Performed by: HOSPITALIST

## 2024-03-27 PROCEDURE — 1159F MED LIST DOCD IN RCRD: CPT | Mod: CPTII,,, | Performed by: HOSPITALIST

## 2024-03-27 NOTE — PROGRESS NOTES
CARDIOVASCULAR CONSULTATION        REASON FOR CONSULT:   Windy Alcocer is a 38 y.o. female who presents for   Chief Complaint   Patient presents with    Follow-up     Cath follow-up    Palpitations     With exertions          HISTORY OF PRESENT ILLNESS:   38 y.o. female who  has a past medical history of Anxiety and Depression.    Today we discussed the patient's cardiac symptoms at length.     PAST MEDICAL HISTORY:     Past Medical History:   Diagnosis Date    Anxiety     Depression        PAST SURGICAL HISTORY:     Past Surgical History:   Procedure Laterality Date    BREAST SURGERY      LEFT HEART CATHETERIZATION N/A 3/19/2024    Procedure: Left heart cath;  Surgeon: August Guajardo MD;  Location: [facility] CATH LAB;  Service: Cardiology;  Laterality: N/A;       ALLERGIES AND MEDICATION:   Review of patient's allergies indicates:  No Known Allergies     Medication List       Accurate as of March 27, 2024  3:58 PM.  If you have any questions, ask your nurse or doctor.       Continue taking these medications      clonazePAM 1 MG tablet  Commonly known as: KlonoPIN  Take 1 tablet (1 mg total) by mouth once daily AND 1.5 tablets (1.5 mg total) every evening.     DEPO-PROVERA 150 mg/mL injection  Generic drug: medroxyPROGESTERone     metoprolol succinate 25 MG 24 hr tablet  Commonly known as: TOPROL-XL  Take 1 tablet (25 mg total) by mouth once daily.              SOCIAL HISTORY:     Social History     Socioeconomic History    Marital status: Single   Tobacco Use    Smoking status: Every Day     Types: Cigarettes     Passive exposure: Current    Smokeless tobacco: Never   Substance and Sexual Activity    Alcohol use: Yes     Comment: occasionally    Drug use: Yes     Frequency: 2.0 times per week     Types: Marijuana, Hydrocodone     Comment: last smoked 4 days ago    Sexual activity: Yes     Partners: Male     Social Determinants of Health     Financial Resource Strain: Medium Risk (2/6/2024)    Overall Financial  Resource Strain (CARDIA)     Difficulty of Paying Living Expenses: Somewhat hard   Food Insecurity: Food Insecurity Present (2/6/2024)    Hunger Vital Sign     Worried About Running Out of Food in the Last Year: Never true     Ran Out of Food in the Last Year: Sometimes true   Transportation Needs: Unmet Transportation Needs (2/6/2024)    PRAPARE - Transportation     Lack of Transportation (Medical): Yes     Lack of Transportation (Non-Medical): Yes   Physical Activity: Sufficiently Active (2/6/2024)    Exercise Vital Sign     Days of Exercise per Week: 7 days     Minutes of Exercise per Session: 30 min   Stress: Stress Concern Present (2/6/2024)    [employer] - Occupational Stress Questionnaire     Feeling of Stress : Very much   Social Connections: Unknown (2/6/2024)    Social Connection and Isolation Panel [NHANES]     Frequency of Communication with Friends and Family: Twice a week     Frequency of Social Gatherings with Friends and Family: Once a week     Active Member of Clubs or Organizations: Yes     Attends Club or Organization Meetings: 1 to 4 times per year     Marital Status: Living with partner   Housing Stability: High Risk (2/6/2024)    Housing Stability Vital Sign     Unable to Pay for Housing in the Last Year: Yes     Number of Places Lived in the Last Year: 3     Unstable Housing in the Last Year: Yes       FAMILY HISTORY:   History reviewed. No pertinent family history.    REVIEW OF SYSTEMS:       Cardiology focused 12-point ROS otherwise unremarkable except for as mentioned in HPI and A/P.   Pertinent Positives and Negatives documented herein.       PHYSICAL EXAM:     Vitals:    03/27/24 1408   BP: 90/65   Pulse: 80    Body mass index is 21.88 kg/m².  Weight: 55 kg (121 lb 3.2 oz)         Physical Exam      DATA:     Laboratory:  CBC:  Recent Labs   Lab 10/24/23  1548 01/12/24  1012 03/11/24  1508   WBC 7.33 6.80 6.96   Hemoglobin 12.1 13.5 11.6 L   Hematocrit 38.2  "41.3 37.4 L   Platelet Count 181 222 176       CHEMISTRIES:  Recent Labs   Lab 01/20/23  2142 09/11/23  0749 10/24/23  1548 01/12/24  1125 03/11/24  1508   Glucose 97 96 72 L 75 64 L   Sodium 144 137 143 139 141   Potassium 3.9 3.5 3.8 3.4 L 3.5   BUN 16 13 12 14 13   Creatinine 0.72 0.64 0.76 0.64 0.66   Calcium 8.3 L 9.0 8.6 9.3 8.8   Magnesium 2.1 2.1  --   --   --        CARDIAC BIOMARKERS:      No results found for: "BNP"    COAGS:        LIPIDS/LFTS:  Recent Labs   Lab 09/11/23  0749 10/24/23  1548 01/12/24  1125   AST 14 L 20 21   ALT 23 32 28       No results found for: "HGBA1C"    TSH  Recent Labs   Lab 02/06/24  1017   TSH 0.603       The ASCVD Risk score (Wesley CHAVEZ, et al., 2019) failed to calculate for the following reasons:    The 2019 ASCVD risk score is only valid for ages 40 to 79     IMAGING  No orders to display       ASSESSMENT AND PLAN     Patient Active Problem List   Diagnosis    Anxiety    Reactive depression    Palpitations    Atypical angina    New onset of congestive heart failure    ACC/AHA stage B diastolic heart failure    Encounter to discuss test results    Anemia    Tobacco abuse counseling    Pain in joint of left shoulder         Orders Placed This Encounter   Procedures    Echo       1. Tobacco abuse counseling    2. Pain in joint of left shoulder    3. ACC/AHA stage C congestive heart failure due to ischemic cardiomyopathy  - Echo; Future    4. ACC/AHA stage B diastolic heart failure    5. Palpitations      Problem Noted   Pain in Joint of Left Shoulder 3/27/2024    Trial of diclofenac gel     Tobacco Abuse Counseling 3/26/2024    Bupropion 150mg daily for three days, then 300mg for 6-12 mo, pick quit date in 2-3 weeks after beginning medication  Nicorette brand name (WHITE ICE MINT) 4mg gum      Atypical Angina 3/11/2024   New Onset of Congestive Heart Failure 3/11/2024    Results for orders placed during the hospital encounter of 02/21/24    Echo    Interpretation Summary    " Left Ventricle: The left ventricle is smaller than normal. Wall is thinner than normal. Regional wall motion abnormalities present. Difficult to name precise segments, but mid-apical lateral wall segments There is moderately reduced systolic function. Ejection fraction by visual approximation is 40%. There is diastolic dysfunction but grade cannot be determined.    Right Ventricle: Normal right ventricular cavity size. Systolic function is normal.    Aortic Valve: Mildly restricted motion. Aortic valve peak velocity is 1.17 m/s. Mean gradient is 3 mmHg.    Pulmonary Artery: The estimated pulmonary artery systolic pressure is 12 mmHg.    IVC/SVC: Normal venous pressure at 3 mmHg.       Acc/Aha Stage B Diastolic Heart Failure 3/11/2024    HFmEF - EF 40-45%   -palpitations w/ exertion, but now symptoms consistent w/ ACC/AHA stage B   -encouraged exercise, smoking cessation  -discussed narcotics - patient - advised would be best      Palpitations 1/9/2024    Mild infrequent - discussed ziopatch, no need at the moment as palpitations are mild, will discuss at f/u or if palpitations become symptomatic                      This note was dictated with the help of speech recognition software.  There might be un-intended errors and/or substitutions.

## 2024-04-01 RX ORDER — BUPROPION HYDROCHLORIDE 300 MG/1
300 TABLET ORAL DAILY
Qty: 30 TABLET | Refills: 11 | Status: SHIPPED | OUTPATIENT
Start: 2024-04-01 | End: 2024-04-18

## 2024-04-18 ENCOUNTER — OFFICE VISIT (OUTPATIENT)
Dept: FAMILY MEDICINE | Facility: CLINIC | Age: 39
End: 2024-04-18
Payer: MEDICAID

## 2024-04-18 VITALS
HEIGHT: 62 IN | HEART RATE: 101 BPM | BODY MASS INDEX: 23 KG/M2 | DIASTOLIC BLOOD PRESSURE: 63 MMHG | WEIGHT: 125 LBS | SYSTOLIC BLOOD PRESSURE: 99 MMHG

## 2024-04-18 DIAGNOSIS — Z71.6 TOBACCO ABUSE COUNSELING: ICD-10-CM

## 2024-04-18 DIAGNOSIS — F41.9 ANXIETY: Chronic | ICD-10-CM

## 2024-04-18 DIAGNOSIS — R06.02 SHORTNESS OF BREATH: ICD-10-CM

## 2024-04-18 DIAGNOSIS — Z87.891 HISTORY OF TOBACCO USE: Primary | ICD-10-CM

## 2024-04-18 PROCEDURE — 3074F SYST BP LT 130 MM HG: CPT | Mod: CPTII,,, | Performed by: FAMILY MEDICINE

## 2024-04-18 PROCEDURE — 99406 BEHAV CHNG SMOKING 3-10 MIN: CPT | Mod: ,,, | Performed by: FAMILY MEDICINE

## 2024-04-18 PROCEDURE — 3078F DIAST BP <80 MM HG: CPT | Mod: CPTII,,, | Performed by: FAMILY MEDICINE

## 2024-04-18 PROCEDURE — 1159F MED LIST DOCD IN RCRD: CPT | Mod: CPTII,,, | Performed by: FAMILY MEDICINE

## 2024-04-18 PROCEDURE — 3008F BODY MASS INDEX DOCD: CPT | Mod: CPTII,,, | Performed by: FAMILY MEDICINE

## 2024-04-18 PROCEDURE — 99213 OFFICE O/P EST LOW 20 MIN: CPT | Mod: 25,,, | Performed by: FAMILY MEDICINE

## 2024-04-18 RX ORDER — CLONAZEPAM 1 MG/1
TABLET ORAL
Qty: 225 TABLET | Refills: 0 | Status: SHIPPED | OUTPATIENT
Start: 2024-04-18 | End: 2024-05-16 | Stop reason: SDUPTHER

## 2024-04-18 NOTE — PROGRESS NOTES
"              Ezra Morrison IV, DO  [facility]  [address] [place] [address]  Phone: [phone]      Subjective     Name: Windy Alcocer   Sex: female  YOB: 1985 (38 y.o.)  MRN: [id number]  Visit Date: 04/18/2024   Chief Complaint: Medication Refill        HISTORY OF PRESENT ILLNESS:    Chief Complaint   Patient presents with    Medication Refill       Tobacco Use/Cessation:  I assessed Windy Alcocer and discussed smoking cessation with her for 3-10 minutes. She reports that she has been smoking cigarettes. She has been exposed to tobacco smoke. She has never used smokeless tobacco.      See tests that keep you healthy and the problem oriented documentation below.    Tests to Keep You Healthy    Cervical Cancer Screening: Met on 8/24/2023  Tobacco Cessation: NO      Portions of this note may have been created with voice recognition software. Occasional "wrong-word" or "sound-a-like" substitutions may have occurred due to the inherent limitations of voice recognition software. Please, read the note carefully and recognize, using context, where substitutions have occurred.     PAST MEDICAL HISTORY:  Significant Diagnoses - Patient  has a past medical history of Anxiety and Depression.  Medications - Patient has a current medication list which includes the following long-term medication(s): metoprolol succinate and clonazepam.   Allergies - Patient has No Known Allergies.  Surgeries - Patient  has a past surgical history that includes Breast surgery and Left heart catheterization (N/A, 3/19/2024).  Family History - Patient family history is not on file.      SOCIAL HISTORY:  Tobacco - Patient  reports that she has been smoking cigarettes. She has been exposed to tobacco smoke. She has never used smokeless tobacco.   Alcohol - Patient  reports current alcohol use.   Recreational Drugs - Patient  reports current drug use. Frequency: 2.00 times per week. Drugs: Marijuana and " "Hydrocodone.       Review of Systems   All other systems reviewed and are negative.       Past Medical History:   Diagnosis Date    Anxiety     Depression         Review of patient's allergies indicates:  No Known Allergies     Past Surgical History:   Procedure Laterality Date    BREAST SURGERY      LEFT HEART CATHETERIZATION N/A 3/19/2024    Procedure: Left heart cath;  Surgeon: August Guajardo MD;  Location: [facility] CATH LAB;  Service: Cardiology;  Laterality: N/A;        No family history on file.    Current Outpatient Medications   Medication Instructions    clonazePAM (KLONOPIN) 1 MG tablet Take 1 tablet (1 mg total) by mouth once daily AND 1.5 tablets (1.5 mg total) every evening.    medroxyPROGESTERone (DEPO-PROVERA) 150 mg, Intramuscular, Every 3 months    metoprolol succinate (TOPROL-XL) 25 mg, Oral, Daily        Objective     BP 99/63   Pulse 101   Ht 5' 2.4" (1.585 m)   Wt 56.7 kg (125 lb)   BMI 22.57 kg/m²     Physical Exam  Constitutional:       General: She is not in acute distress.     Appearance: Normal appearance. She is not ill-appearing.   HENT:      Head: Normocephalic and atraumatic.      Right Ear: External ear normal.      Left Ear: External ear normal.   Eyes:      Extraocular Movements: Extraocular movements intact.      Conjunctiva/sclera: Conjunctivae normal.   Cardiovascular:      Rate and Rhythm: Normal rate.      Heart sounds: No murmur heard.  Pulmonary:      Effort: Pulmonary effort is normal.   Musculoskeletal:      Cervical back: Normal range of motion.   Skin:     General: Skin is warm and dry.      Coloration: Skin is not jaundiced.      Findings: No rash.   Neurological:      Mental Status: She is alert.   Psychiatric:         Mood and Affect: Mood normal.        All recently obtained labs have been reviewed and discussed in detail with the patient.   Assessment     1. History of tobacco use    2. Anxiety    3. Tobacco abuse counseling    4. Shortness of breath  "        Plan        Problem List Items Addressed This Visit          Psychiatric    Anxiety (Chronic)    Relevant Medications    clonazePAM (KLONOPIN) 1 MG tablet       Other    Tobacco abuse counseling     The patient was counseled on the dangers of tobacco use, and was advised to quit and reluctant to quit.  Reviewed strategies to maximize success, including removing cigarettes and smoking materials from environment, stress management, substitution of other forms of reinforcement, support of family/friends, and written materials.           Relevant Orders    Complete PFT w/o bronchodilator     Other Visit Diagnoses       History of tobacco use    -  Primary    Relevant Orders    [40072] NC TOBACCO USE CESSATION INTERMEDIATE 3-10 MIN    Complete PFT w/o bronchodilator    Shortness of breath        Relevant Orders    Complete PFT w/o bronchodilator            No follow-ups on file.    Patient advised that is symptoms worsen, they should call or report directly to local emergency department.    Ezra Morrison DO  [facility]

## 2024-04-18 NOTE — ASSESSMENT & PLAN NOTE
The patient was counseled on the dangers of tobacco use, and was advised to quit and reluctant to quit.  Reviewed strategies to maximize success, including removing cigarettes and smoking materials from environment, stress management, substitution of other forms of reinforcement, support of family/friends, and written materials.

## 2024-05-16 ENCOUNTER — OFFICE VISIT (OUTPATIENT)
Dept: FAMILY MEDICINE | Facility: CLINIC | Age: 39
End: 2024-05-16
Payer: MEDICAID

## 2024-05-16 VITALS
HEIGHT: 62 IN | BODY MASS INDEX: 22.45 KG/M2 | HEART RATE: 97 BPM | DIASTOLIC BLOOD PRESSURE: 67 MMHG | WEIGHT: 122 LBS | SYSTOLIC BLOOD PRESSURE: 98 MMHG

## 2024-05-16 DIAGNOSIS — F41.9 ANXIETY: Chronic | ICD-10-CM

## 2024-05-16 DIAGNOSIS — T14.8XXA BRUISING: Primary | ICD-10-CM

## 2024-05-16 PROCEDURE — 3074F SYST BP LT 130 MM HG: CPT | Mod: CPTII,,, | Performed by: FAMILY MEDICINE

## 2024-05-16 PROCEDURE — 99214 OFFICE O/P EST MOD 30 MIN: CPT | Mod: ,,, | Performed by: FAMILY MEDICINE

## 2024-05-16 PROCEDURE — 3008F BODY MASS INDEX DOCD: CPT | Mod: CPTII,,, | Performed by: FAMILY MEDICINE

## 2024-05-16 PROCEDURE — 3078F DIAST BP <80 MM HG: CPT | Mod: CPTII,,, | Performed by: FAMILY MEDICINE

## 2024-05-16 RX ORDER — CLONAZEPAM 1 MG/1
TABLET ORAL
Qty: 75 TABLET | Refills: 0 | Status: SHIPPED | OUTPATIENT
Start: 2024-07-18 | End: 2024-06-12

## 2024-05-17 LAB
APTT PPP: 30.3 SECONDS (ref 25.2–37.3)
INR BLD: 1.01
PROTHROMBIN TIME: 13.2 SECONDS (ref 11.7–14.7)

## 2024-05-19 ENCOUNTER — HOSPITAL ENCOUNTER (EMERGENCY)
Facility: HOSPITAL | Age: 39
Discharge: HOME | End: 2024-05-19
Payer: MEDICAID

## 2024-05-19 VITALS
SYSTOLIC BLOOD PRESSURE: 116 MMHG | HEIGHT: 61 IN | OXYGEN SATURATION: 100 % | RESPIRATION RATE: 18 BRPM | WEIGHT: 122 LBS | HEART RATE: 82 BPM | BODY MASS INDEX: 23.03 KG/M2 | TEMPERATURE: 98 F | DIASTOLIC BLOOD PRESSURE: 83 MMHG

## 2024-05-19 DIAGNOSIS — R51.9 NONINTRACTABLE HEADACHE, UNSPECIFIED CHRONICITY PATTERN, UNSPECIFIED HEADACHE TYPE: Primary | ICD-10-CM

## 2024-05-19 LAB — HCG UR QL IA.RAPID: NEGATIVE

## 2024-05-19 PROCEDURE — 81025 URINE PREGNANCY TEST: CPT | Performed by: NURSE PRACTITIONER

## 2024-05-19 PROCEDURE — 99284 EMERGENCY DEPT VISIT MOD MDM: CPT | Mod: ,,, | Performed by: NURSE PRACTITIONER

## 2024-05-19 PROCEDURE — 99283 EMERGENCY DEPT VISIT LOW MDM: CPT

## 2024-05-19 PROCEDURE — 63600175 PHARM REV CODE 636 W HCPCS: Performed by: NURSE PRACTITIONER

## 2024-05-19 RX ORDER — PROCHLORPERAZINE MALEATE 5 MG
10 TABLET ORAL
Status: COMPLETED | OUTPATIENT
Start: 2024-05-19 | End: 2024-05-19

## 2024-05-19 RX ADMIN — PROCHLORPERAZINE MALEATE 10 MG: 5 TABLET ORAL at 08:05

## 2024-05-19 NOTE — ED PROVIDER NOTES
Encounter Date: 5/19/2024       History     Chief Complaint   Patient presents with    Headache     Pain to left side of head that comes and goes, going down left side of neck     Patient presents to the ED with complaints of a headache, reports she took a Norco this morning but thinks she is just tired from taking care of her daughter during the night that has asthma.     The history is provided by the patient.     Review of patient's allergies indicates:  No Known Allergies  Past Medical History:   Diagnosis Date    Anxiety     Depression      Past Surgical History:   Procedure Laterality Date    BREAST SURGERY      LEFT HEART CATHETERIZATION N/A 3/19/2024    Procedure: Left heart cath;  Surgeon: August Guajardo MD;  Location: [facility] CATH LAB;  Service: Cardiology;  Laterality: N/A;     No family history on file.  Social History     Tobacco Use    Smoking status: Every Day     Types: Cigarettes     Passive exposure: Current    Smokeless tobacco: Never   Substance Use Topics    Alcohol use: Yes     Comment: occasionally    Drug use: Yes     Frequency: 2.0 times per week     Types: Marijuana, Hydrocodone     Comment: last smoked 4 days ago     Review of Systems   Constitutional: Negative.    Respiratory: Negative.     Cardiovascular: Negative.    Musculoskeletal: Negative.    Skin: Negative.    Neurological:  Positive for headaches.   Psychiatric/Behavioral: Negative.     All other systems reviewed and are negative.      Physical Exam     Initial Vitals [05/19/24 0744]   BP Pulse Resp Temp SpO2   116/83 82 18 98.3 °F (36.8 °C) 100 %      MAP       --         Physical Exam    Vitals reviewed.  Constitutional: She appears well-developed and well-nourished.   Cardiovascular:  Normal rate, regular rhythm, normal heart sounds and intact distal pulses.           Pulmonary/Chest: Breath sounds normal.   Musculoskeletal:         General: Normal range of motion.     Neurological: She is alert and oriented to person,  place, and time. She has normal strength. GCS score is 15. GCS eye subscore is 4. GCS verbal subscore is 5. GCS motor subscore is 6.   Skin: Skin is warm and dry. Capillary refill takes less than 2 seconds.   Psychiatric: She has a normal mood and affect. Her behavior is normal. Judgment and thought content normal.         Medical Screening Exam   See Full Note    ED Course   Procedures  Labs Reviewed   HCG QUALITATIVE URINE - Normal          Imaging Results    None          Medications   prochlorperazine tablet 10 mg (10 mg Oral Given 5/19/24 0830)     Medical Decision Making  MDM    Patient presents for emergent evaluation of acute headache that poses a threat to life and/or bodily function.    In the ED patient found to have acute tension headache.      Discharge MDM  I discussed the treatment and discharge plan with the patient. Patient drove her children to the ED, patient was treated with Compazine and her spouse is here to drive her and her kids home.   Patient was discharged in stable condition.  Detailed return precautions discussed.    Risk  Prescription drug management.                                      Clinical Impression:   Final diagnoses:  [R51.9] Nonintractable headache, unspecified chronicity pattern, unspecified headache type (Primary)        ED Disposition Condition    Discharge Stable          ED Prescriptions    None       Follow-up Information       Follow up With Specialties Details Why Contact Info    Ezra Morrison IV, DO Family Medicine In 1 week  [address] [facility] [facility] [place] [address]  [phone]               Carey Ricci, [facility]  05/19/24 0814

## 2024-05-20 PROBLEM — T14.8XXA BRUISING: Status: ACTIVE | Noted: 2024-05-20

## 2024-05-20 NOTE — ASSESSMENT & PLAN NOTE
Undiagnosed new problem with the unclear prognosis.    Given patient's concerned about DVT and high anxiety levels recommend that we Start with PT PTT.

## 2024-05-20 NOTE — PROGRESS NOTES
"              Ezra Morrison IV, DO  [facility]  [address] [place] [address]  Phone: [phone]      Subjective     Name: Windy Alcocer   Sex: female  YOB: 1985 (38 y.o.)  MRN: [id number]  Visit Date: 05/20/2024   Chief Complaint: Numbness (Pt c/o numbness and heavy feeling in left arm and leg )        HISTORY OF PRESENT ILLNESS:    Chief Complaint   Patient presents with    Numbness     Pt c/o numbness and heavy feeling in left arm and leg        HPI  See tests that keep you healthy and the problem oriented documentation below.    Tests to Keep You Healthy    Cervical Cancer Screening: Met on 8/24/2023  Tobacco Cessation: NO      Portions of this note may have been created with voice recognition software. Occasional "wrong-word" or "sound-a-like" substitutions may have occurred due to the inherent limitations of voice recognition software. Please, read the note carefully and recognize, using context, where substitutions have occurred.     PAST MEDICAL HISTORY:  Significant Diagnoses - Patient  has a past medical history of Anxiety and Depression.  Medications - Patient has a current medication list which includes the following long-term medication(s): metoprolol succinate and [START ON 7/18/2024] clonazepam.   Allergies - Patient has No Known Allergies.  Surgeries - Patient  has a past surgical history that includes Breast surgery and Left heart catheterization (N/A, 3/19/2024).  Family History - Patient family history is not on file.      SOCIAL HISTORY:  Tobacco - Patient  reports that she has been smoking cigarettes. She has been exposed to tobacco smoke. She has never used smokeless tobacco.   Alcohol - Patient  reports current alcohol use.   Recreational Drugs - Patient  reports current drug use. Frequency: 2.00 times per week. Drugs: Marijuana and Hydrocodone.       Review of Systems       Past Medical History:   Diagnosis Date    Anxiety     Depression     " "    Review of patient's allergies indicates:  No Known Allergies     Past Surgical History:   Procedure Laterality Date    BREAST SURGERY      LEFT HEART CATHETERIZATION N/A 3/19/2024    Procedure: Left heart cath;  Surgeon: August Guajardo MD;  Location: [facility] CATH LAB;  Service: Cardiology;  Laterality: N/A;        History reviewed. No pertinent family history.    Current Outpatient Medications   Medication Instructions    [START ON 7/18/2024] clonazePAM (KLONOPIN) 1 MG tablet Take 1 tablet (1 mg total) by mouth once daily AND 1.5 tablets (1.5 mg total) every evening.    medroxyPROGESTERone (DEPO-PROVERA) 150 mg, Intramuscular, Every 3 months    metoprolol succinate (TOPROL-XL) 25 mg, Oral, Daily        Objective     BP 98/67   Pulse 97   Ht 5' 2.4" (1.585 m)   Wt 55.3 kg (122 lb)   BMI 22.03 kg/m²     Physical Exam     All recently obtained labs have been reviewed and discussed in detail with the patient.   Assessment     1. Bruising    2. Anxiety         Plan        Problem List Items Addressed This Visit          Psychiatric    Anxiety (Chronic)      Chronic with exacerbation.  Patient has been seeing a cardiologist in does have some concerns about things that she is read.  We did go over multiple labs and tests from other facilities and other providers.  She is worried about possibly having a clot in her leg.    Wells score of 0 for DVT.   We did however talked about correlation between DVT, smoking, contraceptives.  Have reassured patient have refilled medications.         Relevant Medications    clonazePAM (KLONOPIN) 1 MG tablet (Start on 7/18/2024)       Orthopedic    Bruising - Primary       Undiagnosed new problem with the unclear prognosis.    Given patient's concerned about DVT and high anxiety levels recommend that we Start with PT PTT.         Relevant Orders    PT and PTT (Completed)       No follow-ups on file.    Patient advised that is symptoms worsen, they should call or report " directly to local emergency department.    Ezra Morrison DO  [facility]

## 2024-05-21 LAB
OHS QRS DURATION: 78 MS
OHS QTC CALCULATION: 395 MS

## 2024-05-28 ENCOUNTER — HOSPITAL ENCOUNTER (OUTPATIENT)
Dept: CARDIOLOGY | Facility: HOSPITAL | Age: 39
Discharge: HOME | End: 2024-05-28
Attending: HOSPITALIST
Payer: MEDICAID

## 2024-05-28 DIAGNOSIS — I25.5 ACC/AHA STAGE C CONGESTIVE HEART FAILURE DUE TO ISCHEMIC CARDIOMYOPATHY: ICD-10-CM

## 2024-05-28 DIAGNOSIS — I50.9 ACC/AHA STAGE C CONGESTIVE HEART FAILURE DUE TO ISCHEMIC CARDIOMYOPATHY: ICD-10-CM

## 2024-05-28 PROCEDURE — 93306 TTE W/DOPPLER COMPLETE: CPT

## 2024-05-28 PROCEDURE — 93306 TTE W/DOPPLER COMPLETE: CPT | Mod: 26,,, | Performed by: HOSPITALIST

## 2024-05-31 ENCOUNTER — PATIENT MESSAGE (OUTPATIENT)
Dept: CARDIOLOGY | Facility: CLINIC | Age: 39
End: 2024-05-31
Payer: MEDICAID

## 2024-05-31 LAB
AORTIC ROOT ANNULUS: 2.3 CM
AV INDEX (PROSTH): 0.78
AV MEAN GRADIENT: 3 MMHG
AV PEAK GRADIENT: 5 MMHG
AV VALVE AREA BY VELOCITY RATIO: 2.17 CM²
AV VALVE AREA: 2.11 CM²
AV VELOCITY RATIO: 0.8
CV ECHO LV RWT: 0.21 CM
DOP CALC AO PEAK VEL: 1.15 M/S
DOP CALC AO VTI: 23.9 CM
DOP CALC LVOT AREA: 2.7 CM2
DOP CALC LVOT DIAMETER: 1.86 CM
DOP CALC LVOT PEAK VEL: 0.92 M/S
DOP CALC LVOT STROKE VOLUME: 50.51 CM3
DOP CALCLVOT PEAK VEL VTI: 18.6 CM
E WAVE DECELERATION TIME: 192.23 MSEC
E/A RATIO: 1.13
E/E' RATIO: 7.15 M/S
ECHO LV POSTERIOR WALL: 0.5 CM (ref 0.6–1.1)
FRACTIONAL SHORTENING: 32 % (ref 28–44)
INTERVENTRICULAR SEPTUM: 0.63 CM (ref 0.6–1.1)
IVC DIAMETER: 1.9 CM
LEFT ATRIUM VOLUME MOD: 23.4 CM3
LEFT INTERNAL DIMENSION IN SYSTOLE: 3.18 CM (ref 2.1–4)
LEFT VENTRICLE DIASTOLIC VOLUME: 101.74 ML
LEFT VENTRICLE SYSTOLIC VOLUME: 40.22 ML
LEFT VENTRICULAR INTERNAL DIMENSION IN DIASTOLE: 4.69 CM (ref 3.5–6)
LEFT VENTRICULAR MASS: 78.79 G
LV LATERAL E/E' RATIO: 6.64 M/S
LV SEPTAL E/E' RATIO: 7.75 M/S
LVOT MG: 1.76 MMHG
LVOT MV: 0.63 CM/S
MV PEAK A VEL: 0.82 M/S
MV PEAK E VEL: 0.93 M/S
MV STENOSIS PRESSURE HALF TIME: 55.75 MS
MV VALVE AREA P 1/2 METHOD: 3.95 CM2
OHS CV RV/LV RATIO: 0.41 CM
PISA TR MAX VEL: 1.72 M/S
PV PEAK GRADIENT: 3 MMHG
PV PEAK VELOCITY: 0.82 M/S
RA PRESSURE ESTIMATED: 3 MMHG
RA VOL SYS: 22.03 ML
RIGHT ATRIAL AREA: 10.3 CM2
RIGHT VENTRICULAR END-DIASTOLIC DIMENSION: 1.9 CM
RIGHT VENTRICULAR LENGTH IN DIASTOLE (APICAL 4-CHAMBER VIEW): 6.71 CM
RV MID DIAMA: 1.76 CM
RV TB RVSP: 5 MMHG
TDI LATERAL: 0.14 M/S
TDI SEPTAL: 0.12 M/S
TDI: 0.13 M/S
TR MAX PG: 12 MMHG
TRICUSPID ANNULAR PLANE SYSTOLIC EXCURSION: 1.84 CM
TV REST PULMONARY ARTERY PRESSURE: 15 MMHG

## 2024-06-02 ENCOUNTER — PATIENT MESSAGE (OUTPATIENT)
Dept: CARDIOLOGY | Facility: CLINIC | Age: 39
End: 2024-06-02
Payer: MEDICAID

## 2024-06-04 ENCOUNTER — PATIENT MESSAGE (OUTPATIENT)
Dept: CARDIOLOGY | Facility: CLINIC | Age: 39
End: 2024-06-04
Payer: MEDICAID

## 2024-06-07 ENCOUNTER — HOSPITAL ENCOUNTER (EMERGENCY)
Facility: HOSPITAL | Age: 39
Discharge: HOME | End: 2024-06-07
Payer: MEDICAID

## 2024-06-07 VITALS
OXYGEN SATURATION: 100 % | HEART RATE: 73 BPM | HEIGHT: 62 IN | RESPIRATION RATE: 16 BRPM | SYSTOLIC BLOOD PRESSURE: 102 MMHG | TEMPERATURE: 99 F | WEIGHT: 120 LBS | BODY MASS INDEX: 22.08 KG/M2 | DIASTOLIC BLOOD PRESSURE: 63 MMHG

## 2024-06-07 DIAGNOSIS — R07.9 CHEST PAIN: ICD-10-CM

## 2024-06-07 DIAGNOSIS — F43.0 STRESS REACTION: Primary | ICD-10-CM

## 2024-06-07 LAB
ALBUMIN SERPL BCP-MCNC: 3.7 G/DL (ref 3.5–5)
ALBUMIN/GLOB SERPL: 1.3 {RATIO}
ALP SERPL-CCNC: 49 U/L (ref 37–98)
ALT SERPL W P-5'-P-CCNC: 13 U/L (ref 13–56)
ANION GAP SERPL CALCULATED.3IONS-SCNC: 7 MMOL/L (ref 7–16)
ANISOCYTOSIS BLD QL SMEAR: ABNORMAL
AST SERPL W P-5'-P-CCNC: 9 U/L (ref 15–37)
BASOPHILS # BLD AUTO: 0.01 K/UL (ref 0–0.2)
BASOPHILS NFR BLD AUTO: 0.2 % (ref 0–1)
BILIRUB SERPL-MCNC: 0.6 MG/DL (ref ?–1.2)
BUN SERPL-MCNC: 13 MG/DL (ref 7–18)
BUN/CREAT SERPL: 21 (ref 6–20)
CALCIUM SERPL-MCNC: 8.2 MG/DL (ref 8.5–10.1)
CHLORIDE SERPL-SCNC: 105 MMOL/L (ref 98–107)
CO2 SERPL-SCNC: 29 MMOL/L (ref 21–32)
CREAT SERPL-MCNC: 0.63 MG/DL (ref 0.55–1.02)
DIFFERENTIAL METHOD BLD: ABNORMAL
EGFR (NO RACE VARIABLE) (RUSH/TITUS): 117 ML/MIN/1.73M2
EOSINOPHIL # BLD AUTO: 0.14 K/UL (ref 0–0.5)
EOSINOPHIL NFR BLD AUTO: 3.1 % (ref 1–4)
ERYTHROCYTE [DISTWIDTH] IN BLOOD BY AUTOMATED COUNT: 13.9 % (ref 11.5–14.5)
GLOBULIN SER-MCNC: 2.9 G/DL (ref 2–4)
GLUCOSE SERPL-MCNC: 86 MG/DL (ref 74–106)
HCT VFR BLD AUTO: 35.2 % (ref 38–47)
HGB BLD-MCNC: 11 G/DL (ref 12–16)
HYPOCHROMIA BLD QL SMEAR: ABNORMAL
IMM GRANULOCYTES # BLD AUTO: 0 K/UL (ref 0–0.04)
IMM GRANULOCYTES NFR BLD: 0 % (ref 0–0.4)
LYMPHOCYTES # BLD AUTO: 2.66 K/UL (ref 1–4.8)
LYMPHOCYTES NFR BLD AUTO: 58.1 % (ref 27–41)
MAGNESIUM SERPL-MCNC: 2.2 MG/DL (ref 1.7–2.3)
MCH RBC QN AUTO: 22.5 PG (ref 27–31)
MCHC RBC AUTO-ENTMCNC: 31.3 G/DL (ref 32–36)
MCV RBC AUTO: 72 FL (ref 80–96)
MICROCYTES BLD QL SMEAR: ABNORMAL
MONOCYTES # BLD AUTO: 0.36 K/UL (ref 0–0.8)
MONOCYTES NFR BLD AUTO: 7.9 % (ref 2–6)
MPC BLD CALC-MCNC: 138 FL (ref 9.4–12.4)
NEUTROPHILS # BLD AUTO: 1.41 K/UL (ref 1.8–7.7)
NEUTROPHILS NFR BLD AUTO: 30.7 % (ref 53–65)
NRBC, AUTO (.00): 0 %
NT-PROBNP SERPL-MCNC: 42 PG/ML (ref 1–125)
OHS QRS DURATION: 78 MS
OHS QTC CALCULATION: 402 MS
PLATELET # BLD AUTO: 138 K/UL (ref 150–400)
PLATELET MORPHOLOGY: ABNORMAL
POIKILOCYTOSIS BLD QL SMEAR: ABNORMAL
POTASSIUM SERPL-SCNC: 4.1 MMOL/L (ref 3.5–5.1)
PROT SERPL-MCNC: 6.6 G/DL (ref 6.4–8.2)
RBC # BLD AUTO: 4.89 M/UL (ref 4.2–5.4)
SODIUM SERPL-SCNC: 137 MMOL/L (ref 136–145)
TROPONIN I SERPL DL<=0.01 NG/ML-MCNC: <4 PG/ML
WBC # BLD AUTO: 4.58 K/UL (ref 4.5–11)

## 2024-06-07 PROCEDURE — 99284 EMERGENCY DEPT VISIT MOD MDM: CPT | Mod: ,,, | Performed by: NURSE PRACTITIONER

## 2024-06-07 PROCEDURE — 84484 ASSAY OF TROPONIN QUANT: CPT | Performed by: NURSE PRACTITIONER

## 2024-06-07 PROCEDURE — 80053 COMPREHEN METABOLIC PANEL: CPT | Performed by: NURSE PRACTITIONER

## 2024-06-07 PROCEDURE — 93005 ELECTROCARDIOGRAM TRACING: CPT

## 2024-06-07 PROCEDURE — 83880 ASSAY OF NATRIURETIC PEPTIDE: CPT | Performed by: NURSE PRACTITIONER

## 2024-06-07 PROCEDURE — 99285 EMERGENCY DEPT VISIT HI MDM: CPT | Mod: 25

## 2024-06-07 PROCEDURE — 93010 ELECTROCARDIOGRAM REPORT: CPT | Mod: ,,, | Performed by: HOSPITALIST

## 2024-06-07 PROCEDURE — 83735 ASSAY OF MAGNESIUM: CPT | Performed by: NURSE PRACTITIONER

## 2024-06-07 PROCEDURE — 85025 COMPLETE CBC W/AUTO DIFF WBC: CPT | Performed by: NURSE PRACTITIONER

## 2024-06-07 PROCEDURE — 36415 COLL VENOUS BLD VENIPUNCTURE: CPT | Performed by: NURSE PRACTITIONER

## 2024-06-07 NOTE — ED PROVIDER NOTES
Encounter Date: 6/7/2024       History     Chief Complaint   Patient presents with    Chest Pain     Pt presents with c/o right upper chest pain. Onset one hour PTA. Pt also states she sleeps on her right side.     Pt reports around 8am this morning she was fussing at her kids for not listening to her when she developed sharp right sided chest pain.  Pain lasted a few seconds and went away on its own.  She reports pain didn't radiate.  No associated symptoms.  Pt is followed by Dr Guajardo with cardiology at [facility].  Had heart cath in March which showed Non-obstructive coronaries, left-dominant circulation, Minor luminal irregularities only.  She also has hx of anxiety/depression and CHF secondary to ischemic cardiomyopathy.        The history is provided by the patient.     Review of patient's allergies indicates:  No Known Allergies  Past Medical History:   Diagnosis Date    Anxiety     Depression      Past Surgical History:   Procedure Laterality Date    BREAST SURGERY      LEFT HEART CATHETERIZATION N/A 3/19/2024    Procedure: Left heart cath;  Surgeon: August Guajardo MD;  Location: [facility] CATH LAB;  Service: Cardiology;  Laterality: N/A;     No family history on file.  Social History     Tobacco Use    Smoking status: Every Day     Types: Cigarettes     Passive exposure: Current    Smokeless tobacco: Never   Substance Use Topics    Alcohol use: Yes     Comment: occasionally    Drug use: Not Currently     Frequency: 2.0 times per week     Types: Marijuana, Hydrocodone     Comment: last smoked 4 days ago     Review of Systems   Constitutional:  Negative for fever.   HENT:  Negative for sore throat.    Respiratory:  Negative for shortness of breath.    Cardiovascular:  Positive for chest pain.   Gastrointestinal:  Negative for nausea.   Genitourinary:  Negative for dysuria.   Musculoskeletal:  Negative for back pain.   Skin:  Negative for rash.   Neurological:  Negative for weakness.   Hematological:   Does not bruise/bleed easily.       Physical Exam     Initial Vitals [06/07/24 1018]   BP Pulse Resp Temp SpO2   118/69 74 16 98.6 °F (37 °C) 99 %      MAP       --         Physical Exam    Nursing note and vitals reviewed.  Constitutional: She appears well-developed and well-nourished. She is not diaphoretic. She is cooperative.  Non-toxic appearance. She does not have a sickly appearance. She does not appear ill. No distress.   HENT:   Head: Normocephalic and atraumatic.   Eyes: Pupils are equal, round, and reactive to light.   Neck: Neck supple.   Cardiovascular:  Normal rate, regular rhythm, normal heart sounds and intact distal pulses.     Exam reveals no gallop and no friction rub.       No murmur heard.  Pulmonary/Chest: Breath sounds normal. No respiratory distress. She has no wheezes. She has no rhonchi. She has no rales. She exhibits no tenderness.   Musculoskeletal:      Cervical back: Neck supple.     Neurological: She is alert and oriented to person, place, and time.   Skin: Skin is warm and dry. Capillary refill takes less than 2 seconds.   Psychiatric: She has a normal mood and affect.         Medical Screening Exam   See Full Note    ED Course   Procedures  Labs Reviewed   COMPREHENSIVE METABOLIC PANEL - Abnormal; Notable for the following components:       Result Value    BUN/Creatinine Ratio 21 (*)     Calcium 8.2 (*)     AST 9 (*)     All other components within normal limits   CBC WITH DIFFERENTIAL - Abnormal; Notable for the following components:    Hemoglobin 11.0 (*)     Hematocrit 35.2 (*)     MCV 72.0 (*)     MCH 22.5 (*)     MCHC 31.3 (*)     Platelet Count 138 (*)     .0 (*)     Neutrophils % 30.7 (*)     Lymphocytes % 58.1 (*)     Monocytes % 7.9 (*)     Neutrophils, Abs 1.41 (*)     All other components within normal limits   CBC MORPHOLOGY - Abnormal; Notable for the following components:    Platelet Morphology Large & Giant Platelets (*)     All other components within normal  limits   MAGNESIUM - Normal   TROPONIN I - Normal   NT-PRO NATRIURETIC PEPTIDE - Normal   CBC W/ AUTO DIFFERENTIAL    Narrative:     The following orders were created for panel order CBC auto differential.  Procedure                               Abnormality         Status                     ---------                               -----------         ------                     CBC with Differential[[id number]]       Abnormal            Final result                 Please view results for these tests on the individual orders.     EKG Readings: (Independently Interpreted)   Initial Reading: No STEMI. Rhythm: Normal Sinus Rhythm. Heart Rate: 75. Ectopy: No Ectopy. Conduction: Normal. ST Segments: Normal ST Segments. T Waves: Normal. Axis: Normal. Clinical Impression: Normal Sinus Rhythm       Imaging Results              X-Ray Chest PA And Lateral (Final result)  Result time 06/07/24 10:46:33      Final result by Memo Branham DO (06/07/24 10:46:33)                   Impression:      No acute pulmonary disease      Electronically signed by: Memo Branham  Date:    06/07/2024  Time:    10:46               Narrative:    EXAMINATION:  XR CHEST PA AND LATERAL    CLINICAL HISTORY:  Chest pain, unspecified    TECHNIQUE:  XR CHEST PA AND LATERAL    COMPARISON:  2023    FINDINGS:  No lines or tubes.    Lungs are clear.    Normal pleura.    Cardiac silhouette is similar to comparison exam.    No obvious acute bone findings.                                       Medications - No data to display  Medical Decision Making  Problems Addressed:  Chest pain: acute illness or injury  Stress reaction: acute illness or injury    Amount and/or Complexity of Data Reviewed  Labs: ordered.  Radiology: ordered.               ED Course as of 06/07/24 1209   Fri Jun 07, 2024   1205 Troponin negative.  No further events of chest pain.  Will dc home to refer to pcp [AG]      ED Course User Index  [AG] Yue Pena, HARDY                            Clinical Impression:   Final diagnoses:  [R07.9] Chest pain  [F43.0] Stress reaction (Primary)        ED Disposition Condition    Discharge Stable          ED Prescriptions    None       Follow-up Information       Follow up With Specialties Details Why Contact Info    August Guajardo MD Cardiology Call today  [address] [place] [id number]  [phone]               Yue Pena, HARDY  06/07/24 1206       Yue Pena, HARDY  06/07/24 3679

## 2024-06-07 NOTE — DISCHARGE INSTRUCTIONS
Call your cardiologist or send him a secure chat to let him know about your ER visit for chest pain.  Follow-up with both him and your family doctor.  Decrease stress in your life.  Return for new or worsening of your symptoms.

## 2024-06-08 ENCOUNTER — PATIENT MESSAGE (OUTPATIENT)
Dept: CARDIOLOGY | Facility: CLINIC | Age: 39
End: 2024-06-08
Payer: MEDICAID

## 2024-06-09 ENCOUNTER — HOSPITAL ENCOUNTER (EMERGENCY)
Facility: HOSPITAL | Age: 39
Discharge: HOME | End: 2024-06-09
Attending: FAMILY MEDICINE
Payer: MEDICAID

## 2024-06-09 VITALS
TEMPERATURE: 99 F | SYSTOLIC BLOOD PRESSURE: 94 MMHG | BODY MASS INDEX: 22.45 KG/M2 | WEIGHT: 122 LBS | DIASTOLIC BLOOD PRESSURE: 62 MMHG | OXYGEN SATURATION: 100 % | HEART RATE: 58 BPM | HEIGHT: 62 IN | RESPIRATION RATE: 20 BRPM

## 2024-06-09 DIAGNOSIS — R07.9 CHEST PAIN: ICD-10-CM

## 2024-06-09 LAB
ALBUMIN SERPL BCP-MCNC: 3.6 G/DL (ref 3.5–5)
ALBUMIN/GLOB SERPL: 1.3 {RATIO}
ALP SERPL-CCNC: 46 U/L (ref 37–98)
ALT SERPL W P-5'-P-CCNC: 12 U/L (ref 13–56)
ANION GAP SERPL CALCULATED.3IONS-SCNC: 8 MMOL/L (ref 7–16)
AST SERPL W P-5'-P-CCNC: 11 U/L (ref 15–37)
BASOPHILS # BLD AUTO: 0.02 K/UL (ref 0–0.2)
BASOPHILS NFR BLD AUTO: 0.4 % (ref 0–1)
BILIRUB SERPL-MCNC: 0.6 MG/DL (ref ?–1.2)
BUN SERPL-MCNC: 18 MG/DL (ref 7–18)
BUN/CREAT SERPL: 18 (ref 6–20)
CALCIUM SERPL-MCNC: 8.8 MG/DL (ref 8.5–10.1)
CHLORIDE SERPL-SCNC: 111 MMOL/L (ref 98–107)
CO2 SERPL-SCNC: 26 MMOL/L (ref 21–32)
CREAT SERPL-MCNC: 0.99 MG/DL (ref 0.55–1.02)
DIFFERENTIAL METHOD BLD: ABNORMAL
EGFR (NO RACE VARIABLE) (RUSH/TITUS): 75 ML/MIN/1.73M2
EOSINOPHIL # BLD AUTO: 0.14 K/UL (ref 0–0.5)
EOSINOPHIL NFR BLD AUTO: 2.5 % (ref 1–4)
ERYTHROCYTE [DISTWIDTH] IN BLOOD BY AUTOMATED COUNT: 14 % (ref 11.5–14.5)
GLOBULIN SER-MCNC: 2.8 G/DL (ref 2–4)
GLUCOSE SERPL-MCNC: 88 MG/DL (ref 74–106)
HCT VFR BLD AUTO: 35.5 % (ref 38–47)
HGB BLD-MCNC: 11.2 G/DL (ref 12–16)
IMM GRANULOCYTES # BLD AUTO: 0 K/UL (ref 0–0.04)
IMM GRANULOCYTES NFR BLD: 0 % (ref 0–0.4)
LYMPHOCYTES # BLD AUTO: 3.38 K/UL (ref 1–4.8)
LYMPHOCYTES NFR BLD AUTO: 61 % (ref 27–41)
MCH RBC QN AUTO: 22.9 PG (ref 27–31)
MCHC RBC AUTO-ENTMCNC: 31.5 G/DL (ref 32–36)
MCV RBC AUTO: 72.6 FL (ref 80–96)
MICROCYTES BLD QL SMEAR: ABNORMAL
MONOCYTES # BLD AUTO: 0.51 K/UL (ref 0–0.8)
MONOCYTES NFR BLD AUTO: 9.2 % (ref 2–6)
MPC BLD CALC-MCNC: ABNORMAL G/DL
NEUTROPHILS # BLD AUTO: 1.49 K/UL (ref 1.8–7.7)
NEUTROPHILS NFR BLD AUTO: 26.9 % (ref 53–65)
NRBC # BLD AUTO: 0 X10E3/UL
NRBC, AUTO (.00): 0 %
OVALOCYTES BLD QL SMEAR: ABNORMAL
PLATELET # BLD AUTO: 131 K/UL (ref 150–400)
PLATELET MORPHOLOGY: ABNORMAL
POTASSIUM SERPL-SCNC: 3.9 MMOL/L (ref 3.5–5.1)
PROT SERPL-MCNC: 6.4 G/DL (ref 6.4–8.2)
RBC # BLD AUTO: 4.89 M/UL (ref 4.2–5.4)
SCHISTOCYTES BLD QL AUTO: ABNORMAL
SODIUM SERPL-SCNC: 141 MMOL/L (ref 136–145)
TROPONIN I SERPL DL<=0.01 NG/ML-MCNC: <4 PG/ML
WBC # BLD AUTO: 5.54 K/UL (ref 4.5–11)

## 2024-06-09 PROCEDURE — 93005 ELECTROCARDIOGRAM TRACING: CPT

## 2024-06-09 PROCEDURE — 80053 COMPREHEN METABOLIC PANEL: CPT | Performed by: FAMILY MEDICINE

## 2024-06-09 PROCEDURE — 85025 COMPLETE CBC W/AUTO DIFF WBC: CPT | Performed by: FAMILY MEDICINE

## 2024-06-09 PROCEDURE — 99284 EMERGENCY DEPT VISIT MOD MDM: CPT | Mod: 25

## 2024-06-09 PROCEDURE — 36415 COLL VENOUS BLD VENIPUNCTURE: CPT | Performed by: FAMILY MEDICINE

## 2024-06-09 PROCEDURE — 84484 ASSAY OF TROPONIN QUANT: CPT | Performed by: FAMILY MEDICINE

## 2024-06-09 PROCEDURE — 93010 ELECTROCARDIOGRAM REPORT: CPT | Mod: ,,, | Performed by: INTERNAL MEDICINE

## 2024-06-09 RX ORDER — LORAZEPAM 2 MG/ML
2 INJECTION INTRAMUSCULAR
Status: DISCONTINUED | OUTPATIENT
Start: 2024-06-09 | End: 2024-06-09

## 2024-06-10 NOTE — ED PROVIDER NOTES
Encounter Date: 6/9/2024    SCRIBE #1 NOTE: I, Rosa Candelaria, am scribing for, and in the presence of,  Ezra Merchant, DO. I have scribed the entire note.       History     Chief Complaint   Patient presents with    Headache     Pov to er - pt states she is a heart pt - went to [place] and had labs done - still c/o ha - states she is fixing to go out of town and she wants to make sure everything's ok     This is a 38 y/o female,who presents to the ED for evaluation. She states she was seen in [place] and had blood work performed. She notes she would like a second opinion. She notes she is having a HA and believes she is running a fever. She notes having chest pain Friday, 08/07/2024 but none today and she is not short of breath. She states she is going out of town in the next few days and just to be sure everything is good. There are no other complaints/pain in the ED at this time.     The history is provided by the patient. No  was used.     Review of patient's allergies indicates:  No Known Allergies  Past Medical History:   Diagnosis Date    Anxiety     Depression      Past Surgical History:   Procedure Laterality Date    BREAST SURGERY      LEFT HEART CATHETERIZATION N/A 3/19/2024    Procedure: Left heart cath;  Surgeon: August Guajardo MD;  Location: [facility] CATH LAB;  Service: Cardiology;  Laterality: N/A;     No family history on file.  Social History     Tobacco Use    Smoking status: Every Day     Types: Cigarettes     Passive exposure: Current    Smokeless tobacco: Never   Substance Use Topics    Alcohol use: Yes     Comment: occasionally    Drug use: Not Currently     Frequency: 2.0 times per week     Types: Marijuana, Hydrocodone     Comment: last smoked 4 days ago     Review of Systems   Constitutional:         Wants blood work drawn.      Cardiovascular:  Negative for chest pain.   All other systems reviewed and are negative.      Physical Exam     Initial Vitals  [06/09/24 2012]   BP Pulse Resp Temp SpO2   108/70 90 16 99.4 °F (37.4 °C) 98 %      MAP       --         Physical Exam    Nursing note and vitals reviewed.  Constitutional: She appears well-developed and well-nourished.   HENT:   Head: Normocephalic and atraumatic.   Eyes: Conjunctivae and EOM are normal. Pupils are equal, round, and reactive to light.   Neck: Neck supple.   Normal range of motion.  Cardiovascular:  Normal rate, regular rhythm, normal heart sounds and intact distal pulses.           Pulmonary/Chest: Breath sounds normal.   Abdominal: Abdomen is soft. Bowel sounds are normal.   Musculoskeletal:         General: Normal range of motion.      Cervical back: Normal range of motion and neck supple.     Neurological: She is alert and oriented to person, place, and time. She has normal strength.   Skin: Skin is warm and dry. Capillary refill takes less than 2 seconds.   Psychiatric: She has a normal mood and affect. Thought content normal.         ED Course   Procedures  Labs Reviewed   COMPREHENSIVE METABOLIC PANEL - Abnormal; Notable for the following components:       Result Value    Chloride 111 (*)     ALT 12 (*)     AST 11 (*)     All other components within normal limits   CBC WITH DIFFERENTIAL - Abnormal; Notable for the following components:    Hemoglobin 11.2 (*)     Hematocrit 35.5 (*)     MCV 72.6 (*)     MCH 22.9 (*)     MCHC 31.5 (*)     Platelet Count 131 (*)     Neutrophils % 26.9 (*)     Lymphocytes % 61.0 (*)     Monocytes % 9.2 (*)     Neutrophils, Abs 1.49 (*)     All other components within normal limits   CBC MORPHOLOGY - Abnormal; Notable for the following components:    Platelet Morphology Decreased (*)     All other components within normal limits   TROPONIN I - Normal   CBC W/ AUTO DIFFERENTIAL    Narrative:     The following orders were created for panel order CBC Auto Differential.  Procedure                               Abnormality         Status                     ---------                                -----------         ------                     CBC with Differential[[id number]]       Abnormal            Final result                 Please view results for these tests on the individual orders.          Imaging Results    None          Medications - No data to display  Medical Decision Making  Amount and/or Complexity of Data Reviewed  Labs: ordered.              Attending Attestation:           Physician Attestation for Scribe:  Physician Attestation Statement for Scribe #1: I, Ezra Merchant DO, reviewed documentation, as scribed by Rosa Houston in my presence, and it is both accurate and complete.                        Medical Decision Making:   Initial Assessment:   This is a 38 y/o female,who presents to the ED for evaluation. She states she was seen in [place] and had blood work performed. She notes she would like a second opinion. She notes she is having a HA and believes she is running a fever. She notes having chest pain Friday, 08/07/2024 but none today and she is not short of breath. She states she is going out of town in the next few days and just to be sure everything is good. There are no other complaints/pain in the ED at this time.     The history is provided by the patient. No  was used.     Differential Diagnosis:   History of CHF  ED Management:  Follow up primary care provider             Clinical Impression:  Final diagnoses:  [R07.9] Chest pain          ED Disposition Condition    Discharge Stable          ED Prescriptions    None       Follow-up Information    None          Ezra Merchant DO  06/09/24 9957

## 2024-06-11 LAB
OHS QRS DURATION: 82 MS
OHS QTC CALCULATION: 398 MS

## 2024-06-12 ENCOUNTER — OFFICE VISIT (OUTPATIENT)
Dept: FAMILY MEDICINE | Facility: CLINIC | Age: 39
End: 2024-06-12
Payer: MEDICAID

## 2024-06-12 VITALS
DIASTOLIC BLOOD PRESSURE: 62 MMHG | SYSTOLIC BLOOD PRESSURE: 95 MMHG | HEART RATE: 86 BPM | BODY MASS INDEX: 23.19 KG/M2 | HEIGHT: 62 IN | WEIGHT: 126 LBS

## 2024-06-12 DIAGNOSIS — F41.9 ANXIETY: Chronic | ICD-10-CM

## 2024-06-12 PROCEDURE — 1159F MED LIST DOCD IN RCRD: CPT | Mod: CPTII,,, | Performed by: FAMILY MEDICINE

## 2024-06-12 PROCEDURE — 3008F BODY MASS INDEX DOCD: CPT | Mod: CPTII,,, | Performed by: FAMILY MEDICINE

## 2024-06-12 PROCEDURE — 3078F DIAST BP <80 MM HG: CPT | Mod: CPTII,,, | Performed by: FAMILY MEDICINE

## 2024-06-12 PROCEDURE — 99213 OFFICE O/P EST LOW 20 MIN: CPT | Mod: ,,, | Performed by: FAMILY MEDICINE

## 2024-06-12 PROCEDURE — 3074F SYST BP LT 130 MM HG: CPT | Mod: CPTII,,, | Performed by: FAMILY MEDICINE

## 2024-06-12 RX ORDER — CLONAZEPAM 1 MG/1
TABLET ORAL
Qty: 75 TABLET | Refills: 0 | Status: SHIPPED | OUTPATIENT
Start: 2024-07-03 | End: 2024-08-02

## 2024-06-12 NOTE — PROGRESS NOTES
"              Ezra Morrison IV, DO  [facility]  [address], [place] [address]  Phone: [phone]      Subjective     Name: Windy Alcocer   Sex: female  YOB: 1985 (39 y.o.)  MRN: [id number]  Visit Date: 06/12/2024   Chief Complaint: Hand Pain        HISTORY OF PRESENT ILLNESS:    Chief Complaint   Patient presents with    Hand Pain       HPI  See tests that keep you healthy and the problem oriented documentation below.    Tests to Keep You Healthy    Cervical Cancer Screening: Met on 8/24/2023  Tobacco Cessation: NO      Portions of this note may have been created with voice recognition software. Occasional "wrong-word" or "sound-a-like" substitutions may have occurred due to the inherent limitations of voice recognition software. Please, read the note carefully and recognize, using context, where substitutions have occurred.     PAST MEDICAL HISTORY:  Significant Diagnoses - Patient  has a past medical history of Anxiety and Depression.  Medications - Patient has a current medication list which includes the following long-term medication(s): metoprolol succinate and [START ON 7/3/2024] clonazepam.   Allergies - Patient has No Known Allergies.  Surgeries - Patient  has a past surgical history that includes Breast surgery and Left heart catheterization (N/A, 3/19/2024).  Family History - Patient family history is not on file.      SOCIAL HISTORY:  Tobacco - Patient  reports that she has been smoking cigarettes. She has been exposed to tobacco smoke. She has never used smokeless tobacco.   Alcohol - Patient  reports current alcohol use.   Recreational Drugs - Patient  reports that she does not currently use drugs after having used the following drugs: Marijuana and Hydrocodone. Frequency: 2.00 times per week.       Review of Systems   All other systems reviewed and are negative.       Past Medical History:   Diagnosis Date    Anxiety     Depression         Review of " "patient's allergies indicates:  No Known Allergies     Past Surgical History:   Procedure Laterality Date    BREAST SURGERY      LEFT HEART CATHETERIZATION N/A 3/19/2024    Procedure: Left heart cath;  Surgeon: August Guajardo MD;  Location: [facility] CATH LAB;  Service: Cardiology;  Laterality: N/A;        History reviewed. No pertinent family history.    Current Outpatient Medications   Medication Instructions    [START ON 7/3/2024] clonazePAM (KLONOPIN) 1 MG tablet Take 1 tablet (1 mg total) by mouth once daily AND 1.5 tablets (1.5 mg total) every evening.    medroxyPROGESTERone (DEPO-PROVERA) 150 mg, Intramuscular, Every 3 months    metoprolol succinate (TOPROL-XL) 25 mg, Oral, Daily        Objective     BP 95/62   Pulse 86   Ht 5' 2" (1.575 m)   Wt 57.2 kg (126 lb)   BMI 23.05 kg/m²     Physical Exam  Constitutional:       General: She is not in acute distress.     Appearance: Normal appearance. She is not ill-appearing.   HENT:      Head: Normocephalic and atraumatic.      Right Ear: External ear normal.      Left Ear: External ear normal.   Eyes:      Extraocular Movements: Extraocular movements intact.      Conjunctiva/sclera: Conjunctivae normal.   Cardiovascular:      Rate and Rhythm: Normal rate.      Heart sounds: No murmur heard.  Pulmonary:      Effort: Pulmonary effort is normal.   Musculoskeletal:      Cervical back: Normal range of motion.   Skin:     General: Skin is warm and dry.      Coloration: Skin is not jaundiced.      Findings: No rash.   Neurological:      Mental Status: She is alert.   Psychiatric:         Mood and Affect: Mood normal.        All recently obtained labs have been reviewed and discussed in detail with the patient.   Assessment     1. Anxiety         Plan        Problem List Items Addressed This Visit          Psychiatric    Anxiety (Chronic)    Relevant Medications    clonazePAM (KLONOPIN) 1 MG tablet (Start on 7/3/2024)       No follow-ups on file.    Patient " advised that is symptoms worsen, they should call or report directly to local emergency department.    Ezra Morrison,   [facility]

## 2024-06-19 ENCOUNTER — HOSPITAL ENCOUNTER (EMERGENCY)
Facility: HOSPITAL | Age: 39
Discharge: HOME | End: 2024-06-19
Payer: MEDICAID

## 2024-06-19 VITALS
SYSTOLIC BLOOD PRESSURE: 115 MMHG | WEIGHT: 126 LBS | OXYGEN SATURATION: 100 % | RESPIRATION RATE: 16 BRPM | HEIGHT: 64 IN | BODY MASS INDEX: 21.51 KG/M2 | DIASTOLIC BLOOD PRESSURE: 74 MMHG | TEMPERATURE: 99 F | HEART RATE: 77 BPM

## 2024-06-19 DIAGNOSIS — L50.9 URTICARIA: ICD-10-CM
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
## 2024-06-19 PROCEDURE — 99282 EMERGENCY DEPT VISIT SF MDM: CPT

## 2024-06-19 PROCEDURE — 99282 EMERGENCY DEPT VISIT SF MDM: CPT | Mod: ,,,

## 2024-06-19 RX ORDER — HYDROCODONE BITARTRATE AND ACETAMINOPHEN 5; 325 MG/1; MG/1
TABLET ORAL
COMMUNITY
Start: 2024-01-18

## 2024-06-19 RX ORDER — ESCITALOPRAM OXALATE 20 MG/1
TABLET ORAL
COMMUNITY
Start: 2024-02-01

## 2024-06-19 RX ORDER — IBUPROFEN 800 MG/1
TABLET ORAL
COMMUNITY
Start: 2024-02-01

## 2024-06-19 NOTE — DISCHARGE INSTRUCTIONS
Take Benadryl over-the-counter every 6 hours as needed for itching.  We recommend not combining this medication with your Klonopin as discussed.  You may also try hydrocortisone cream locally to the areas of insect bites.  Follow up with the primary care provider in 1-2 days for a recheck.  Return to emergency department for weakness, difficulty breathing, difficulty swallowing, shortness of breath, or any other new or worrisome symptoms.

## 2024-06-19 NOTE — ED PROVIDER NOTES
Encounter Date: 6/19/2024       History     Chief Complaint   Patient presents with    Rash    Insect Bite    Headache     Patient is a 39-year-old female who presents to emergency department with complaints of insect bites to her bilateral upper extremities.  She reports that she noticed these areas yesterday.  She suspects that she was bitten by a horse fly throughout the night.  She has been applying Neosporin ointment throughout the day today and reports no improvement.  She denies any difficulty breathing, difficulty swallowing, chest pain, shortness of breath, diaphoresis, wheezing, stridor, or any other complaints.  Her blood pressure is 120/82, temperature 98.5°, heart rate 93, respirations 18, and oxygen saturation 98% on room air.  She appears in no acute distress.    The history is provided by the patient.     Review of patient's allergies indicates:  No Known Allergies  Past Medical History:   Diagnosis Date    Anxiety     Depression      Past Surgical History:   Procedure Laterality Date    BREAST SURGERY      LEFT HEART CATHETERIZATION N/A 3/19/2024    Procedure: Left heart cath;  Surgeon: August Guajardo MD;  Location: [facility] CATH LAB;  Service: Cardiology;  Laterality: N/A;     No family history on file.  Social History     Tobacco Use    Smoking status: Every Day     Current packs/day: 0.25     Types: Cigarettes     Passive exposure: Current    Smokeless tobacco: Never   Substance Use Topics    Alcohol use: Not Currently     Comment: occasionally    Drug use: Not Currently     Frequency: 2.0 times per week     Types: Marijuana, Hydrocodone     Comment: last smoked 4 days ago     Review of Systems   Constitutional:  Negative for activity change, appetite change, chills and fever.   HENT:  Negative for congestion, sore throat and trouble swallowing.    Eyes: Negative.    Respiratory:  Negative for cough, chest tightness, shortness of breath, wheezing and stridor.    Cardiovascular:  Negative for  chest pain and palpitations.   Gastrointestinal:  Negative for abdominal distention, abdominal pain, diarrhea, nausea and vomiting.   Endocrine: Negative.    Genitourinary:  Negative for dysuria and frequency.   Musculoskeletal:  Negative for arthralgias, back pain, neck pain and neck stiffness.   Skin:  Positive for rash. Negative for color change and pallor.   Allergic/Immunologic: Negative for food allergies and immunocompromised state.   Neurological:  Negative for dizziness, syncope, facial asymmetry, speech difficulty, weakness, light-headedness and headaches.   Hematological:  Does not bruise/bleed easily.   Psychiatric/Behavioral:  Negative for confusion. The patient is nervous/anxious.    All other systems reviewed and are negative.      Physical Exam     Initial Vitals [06/19/24 1741]   BP Pulse Resp Temp SpO2   120/82 93 18 98.5 °F (36.9 °C) 98 %      MAP       --         Physical Exam    Nursing note and vitals reviewed.  Constitutional: She appears well-developed and well-nourished. She is not diaphoretic. No distress.   HENT:   Head: Normocephalic and atraumatic.   Mouth/Throat: Oropharynx is clear and moist.   Eyes: Conjunctivae and EOM are normal. Pupils are equal, round, and reactive to light.   Neck: Neck supple.   Normal range of motion.  Cardiovascular:  Normal rate, regular rhythm, S1 normal, S2 normal, normal heart sounds and normal pulses.           Pulmonary/Chest: Effort normal and breath sounds normal. No accessory muscle usage. No tachypnea. No respiratory distress. She has no decreased breath sounds. She has no wheezes. She has no rhonchi. She has no rales. She exhibits no tenderness.   Abdominal: Abdomen is soft. Bowel sounds are normal. She exhibits no distension. There is no abdominal tenderness. There is no rebound and no guarding.   Musculoskeletal:         General: Normal range of motion.      Cervical back: Normal range of motion and neck supple.     Neurological: She is alert and  oriented to person, place, and time. She has normal strength. GCS score is 15. GCS eye subscore is 4. GCS verbal subscore is 5. GCS motor subscore is 6.   Skin: Skin is warm and dry. Capillary refill takes less than 2 seconds.        Psychiatric: She has a normal mood and affect. Her behavior is normal. Judgment and thought content normal.         Medical Screening Exam   See Full Note    ED Course   Procedures  Labs Reviewed - No data to display       Imaging Results    None          Medications - No data to display  Medical Decision Making  Patient presents to ED for evaluation of bug bites.  She reports itching but denies any signs of anaphylaxis such as wheezing, stridor, diaphoresis, difficulty breathing, difficulty swallowing, or any other complaints.  She has had no treatment prior to arrival to the ED.  She was alert and oriented x4.  GCS 15.  Vital signs stable.  Oxygen saturation 98% on room air.  Breath sounds are equal and clear bilaterally to auscultation.  We did recommend Benadryl as needed for itching but instructed her not to combine this with her Klonopin due to the potential for increased drowsiness.  Also recommended that she could attempt hydrocortisone cream and continue the ice packs as needed.  Recommended a follow up with the primary care provider in 1-2 days for a recheck.  Strict ED return precautions were explained in detail.  All questions were answered.  She verbalizes understanding is in agreement with this plan.    Amount and/or Complexity of Data Reviewed  Independent Historian:      Details: Patient is a 39-year-old female who presents to emergency department with complaints of insect bites to her bilateral upper extremities.  She reports that she noticed these areas yesterday.  She suspects that she was bitten by a horse fly throughout the night.  She has been applying Neosporin ointment throughout the day today and reports no improvement.  She denies any difficulty breathing,  difficulty swallowing, chest pain, shortness of breath, diaphoresis, wheezing, stridor, or any other complaints.  Her blood pressure is 120/82, temperature 98.5°, heart rate 93, respirations 18, and oxygen saturation 98% on room air.  She appears in no acute distress.    Risk  Risk Details: Patient presents for emergent evaluation of acute insect bite that poses a threat to life and/or bodily function.    Final diagnoses:  [W57.XXXA] Insect bite, unspecified site, initial encounter (Primary)  [L50.9] Urticaria  It was not felt that labs or x-rays would be beneficial at this time.     Patient was managed in the ED with symptomatic care at home and reassurance.    The response to treatment was improved.    Patient was discharged in stable condition.  Detailed return precautions discussed.                                       Clinical Impression:   Final diagnoses:  [W57.XXXA] Insect bite, unspecified site, initial encounter (Primary)  [L50.9] Urticaria        ED Disposition Condition    Discharge Stable          ED Prescriptions    None       Follow-up Information       Follow up With Specialties Details Why Contact Info    Ezra Morrison IV, DO Family Medicine Call on 6/20/2024  [address]  [place] [address]  [phone]               Dano Millard, HARDY  06/19/24 8248

## 2024-07-05 ENCOUNTER — HOSPITAL ENCOUNTER (EMERGENCY)
Facility: HOSPITAL | Age: 39
Discharge: HOME | End: 2024-07-05
Payer: MEDICAID

## 2024-07-05 VITALS
RESPIRATION RATE: 16 BRPM | DIASTOLIC BLOOD PRESSURE: 81 MMHG | TEMPERATURE: 98 F | HEIGHT: 62 IN | WEIGHT: 122 LBS | SYSTOLIC BLOOD PRESSURE: 116 MMHG | BODY MASS INDEX: 22.45 KG/M2 | OXYGEN SATURATION: 99 % | HEART RATE: 95 BPM

## 2024-07-05 DIAGNOSIS — R07.89 CHEST WALL PAIN: Primary | ICD-10-CM

## 2024-07-05 PROCEDURE — 99282 EMERGENCY DEPT VISIT SF MDM: CPT | Mod: ,,, | Performed by: NURSE PRACTITIONER

## 2024-07-05 PROCEDURE — 99281 EMR DPT VST MAYX REQ PHY/QHP: CPT

## 2024-07-05 NOTE — DISCHARGE INSTRUCTIONS
Take ibuprofen 600-800 mg every 8 hours or Naproxen 220mg every 12 hours for up to 5 days for your pain. Follow-up with your PCP on Monday if pain persists. Return to the ED if worsening of pain or trouble breathing.

## 2024-07-06 NOTE — ED PROVIDER NOTES
Encounter Date: 7/5/2024       History     Chief Complaint   Patient presents with    Chest Pain     Onset yesterday after eating and worse with movement.     Presented with c/o pain to upper chest that started 2 days ago. Reports pain is only present with movement such as twisting and lifting arms. Denies cough, dyspnea, fever or chills. States that her  recently came home from being gone a while and she reports that some of the activities she did during intimacy were strenuous. States also did have some abd pain that starting after eating today but has since subsided. Denies n/v/d or constipation.      Review of patient's allergies indicates:  No Known Allergies  Past Medical History:   Diagnosis Date    Anxiety     Depression      Past Surgical History:   Procedure Laterality Date    BREAST SURGERY      LEFT HEART CATHETERIZATION N/A 3/19/2024    Procedure: Left heart cath;  Surgeon: August Guajardo MD;  Location: [facility] CATH LAB;  Service: Cardiology;  Laterality: N/A;     No family history on file.  Social History     Tobacco Use    Smoking status: Every Day     Current packs/day: 0.25     Types: Cigarettes     Passive exposure: Current    Smokeless tobacco: Never   Substance Use Topics    Alcohol use: Not Currently     Comment: occasionally    Drug use: Not Currently     Frequency: 2.0 times per week     Types: Marijuana, Hydrocodone     Comment: last smoked 4 days ago     Review of Systems   Constitutional:  Negative for activity change, appetite change and fever.   HENT:  Negative for congestion.    Respiratory:  Negative for cough, chest tightness, shortness of breath and wheezing.    Cardiovascular:  Positive for chest pain (with movement). Negative for palpitations and leg swelling.   Gastrointestinal:  Negative for abdominal pain, constipation, diarrhea, nausea and vomiting.   Genitourinary: Negative.    Musculoskeletal: Negative.    Skin: Negative.    Neurological:  Negative for dizziness,  weakness and headaches.   Psychiatric/Behavioral: Negative.         Physical Exam     Initial Vitals [07/05/24 1715]   BP Pulse Resp Temp SpO2   116/81 95 16 98.3 °F (36.8 °C) 99 %      MAP       --         Physical Exam    Vitals reviewed.  Constitutional: She appears well-developed and well-nourished. No distress.   HENT:   Head: Normocephalic.   Right Ear: External ear normal.   Left Ear: External ear normal.   Nose: Nose normal.   Mouth/Throat: Oropharynx is clear and moist.   Eyes: Conjunctivae and EOM are normal. Pupils are equal, round, and reactive to light.   Neck: Neck supple.   Normal range of motion.  Cardiovascular:  Normal rate, regular rhythm, normal heart sounds and intact distal pulses.           No murmur heard.  Pulmonary/Chest: Breath sounds normal. No respiratory distress. She has no wheezes. She has no rhonchi. She has no rales. She exhibits tenderness.     Abdominal: Abdomen is soft. Bowel sounds are normal. There is no abdominal tenderness.   Musculoskeletal:         General: Normal range of motion.      Cervical back: Normal range of motion and neck supple.     Neurological: She is alert and oriented to person, place, and time. She has normal strength. GCS score is 15. GCS eye subscore is 4. GCS verbal subscore is 5. GCS motor subscore is 6.   Skin: Skin is warm and dry. Capillary refill takes less than 2 seconds.   Psychiatric: She has a normal mood and affect.         Medical Screening Exam   See Full Note    ED Course   Procedures  Labs Reviewed - No data to display  EKG Readings: (Independently Interpreted)   Previous EKG: Compared with most recent EKG Previous EKG Date: 6/9/24. Rhythm: Normal Sinus Rhythm. Heart Rate: 98.   Reviewed at 1720.       Imaging Results    None          Medications - No data to display  Medical Decision Making  Presented with c/o pain to upper chest that started 2 days ago. Reports pain is only present with movement such as twisting and lifting arms. Denies  cough, dyspnea, fever or chills. States that her  recently came home from being gone a while and she reports that some of the activities she did during intimacy were strenuous. States also did have some abd pain that starting after eating today but has since subsided. Denies n/v/d or constipation.    Amount and/or Complexity of Data Reviewed  ECG/medicine tests: ordered. Decision-making details documented in ED Course.    Risk  Risk Details: Pt is stable. Afebrile. HR 95, RR 16, O2 sat 99%, /81.  Instructed on home care, med use, follow-up and return precautions. Discharged home with detailed written instructions provided.                                        Clinical Impression:   Final diagnoses:  [R07.89] Chest wall pain (Primary)        ED Disposition Condition    Discharge Stable          ED Prescriptions    None       Follow-up Information       Follow up With Specialties Details Why Contact Info    Ezra Morrison IV, DO Family Medicine In 3 days  [address] [facility] [id number] [phone]      [facility] - Emergency Department Emergency Medicine  If symptoms worsen [address] [facility] [address] [phone]             Marcela Madrid NP  07/05/24 [id number]

## 2024-07-19 ENCOUNTER — HOSPITAL ENCOUNTER (EMERGENCY)
Facility: HOSPITAL | Age: 39
Discharge: HOME | End: 2024-07-19
Payer: MEDICAID

## 2024-07-19 VITALS
OXYGEN SATURATION: 99 % | WEIGHT: 122 LBS | HEART RATE: 103 BPM | HEIGHT: 62 IN | DIASTOLIC BLOOD PRESSURE: 68 MMHG | RESPIRATION RATE: 19 BRPM | BODY MASS INDEX: 22.45 KG/M2 | TEMPERATURE: 100 F | SYSTOLIC BLOOD PRESSURE: 90 MMHG

## 2024-07-19 DIAGNOSIS — I88.9 LYMPHADENITIS: Primary | ICD-10-CM

## 2024-07-19 DIAGNOSIS — R05.9 COUGH: ICD-10-CM

## 2024-07-19 LAB
ANION GAP SERPL CALCULATED.3IONS-SCNC: 7 MMOL/L (ref 7–16)
ANISOCYTOSIS BLD QL SMEAR: ABNORMAL
BASOPHILS # BLD AUTO: 0.02 K/UL (ref 0–0.2)
BASOPHILS NFR BLD AUTO: 0.4 % (ref 0–1)
BUN SERPL-MCNC: 11 MG/DL (ref 7–18)
BUN/CREAT SERPL: 14 (ref 6–20)
CALCIUM SERPL-MCNC: 9.1 MG/DL (ref 8.5–10.1)
CHLORIDE SERPL-SCNC: 110 MMOL/L (ref 98–107)
CO2 SERPL-SCNC: 27 MMOL/L (ref 21–32)
CREAT SERPL-MCNC: 0.78 MG/DL (ref 0.55–1.02)
CRENATED CELLS: ABNORMAL
DIFFERENTIAL METHOD BLD: ABNORMAL
EGFR (NO RACE VARIABLE) (RUSH/TITUS): 100 ML/MIN/1.73M2
EOSINOPHIL # BLD AUTO: 0.18 K/UL (ref 0–0.5)
EOSINOPHIL NFR BLD AUTO: 3.3 % (ref 1–4)
ERYTHROCYTE [DISTWIDTH] IN BLOOD BY AUTOMATED COUNT: 14.6 % (ref 11.5–14.5)
GLUCOSE SERPL-MCNC: 96 MG/DL (ref 74–106)
GROUP A STREP MOLECULAR (OHS): NEGATIVE
HCT VFR BLD AUTO: 40.4 % (ref 38–47)
HGB BLD-MCNC: 12.6 G/DL (ref 12–16)
HYPOCHROMIA BLD QL SMEAR: ABNORMAL
IMM GRANULOCYTES # BLD AUTO: 0.01 K/UL (ref 0–0.04)
IMM GRANULOCYTES NFR BLD: 0.2 % (ref 0–0.4)
INFLUENZA A MOLECULAR (OHS): NEGATIVE
INFLUENZA B MOLECULAR (OHS): NEGATIVE
LYMPHOCYTES # BLD AUTO: 3.2 K/UL (ref 1–4.8)
LYMPHOCYTES NFR BLD AUTO: 58.3 % (ref 27–41)
MCH RBC QN AUTO: 22.5 PG (ref 27–31)
MCHC RBC AUTO-ENTMCNC: 31.2 G/DL (ref 32–36)
MCV RBC AUTO: 72 FL (ref 80–96)
MICROCYTES BLD QL SMEAR: ABNORMAL
MONOCYTES # BLD AUTO: 0.49 K/UL (ref 0–0.8)
MONOCYTES NFR BLD AUTO: 8.9 % (ref 2–6)
MPC BLD CALC-MCNC: ABNORMAL G/DL
NEUTROPHILS # BLD AUTO: 1.59 K/UL (ref 1.8–7.7)
NEUTROPHILS NFR BLD AUTO: 28.9 % (ref 53–65)
NRBC # BLD AUTO: 0 X10E3/UL
NRBC, AUTO (.00): 0 %
OVALOCYTES BLD QL SMEAR: ABNORMAL
PLATELET # BLD AUTO: 154 K/UL (ref 150–400)
PLATELET MORPHOLOGY: ABNORMAL
POTASSIUM SERPL-SCNC: 4.3 MMOL/L (ref 3.5–5.1)
RBC # BLD AUTO: 5.61 M/UL (ref 4.2–5.4)
SARS-COV-2 RDRP RESP QL NAA+PROBE: NEGATIVE
SCHISTOCYTES BLD QL AUTO: ABNORMAL
SODIUM SERPL-SCNC: 140 MMOL/L (ref 136–145)
WBC # BLD AUTO: 5.49 K/UL (ref 4.5–11)

## 2024-07-19 PROCEDURE — 87635 SARS-COV-2 COVID-19 AMP PRB: CPT | Performed by: NURSE PRACTITIONER

## 2024-07-19 PROCEDURE — 87651 STREP A DNA AMP PROBE: CPT | Performed by: NURSE PRACTITIONER

## 2024-07-19 PROCEDURE — 36415 COLL VENOUS BLD VENIPUNCTURE: CPT | Performed by: NURSE PRACTITIONER

## 2024-07-19 PROCEDURE — 87502 INFLUENZA DNA AMP PROBE: CPT | Performed by: NURSE PRACTITIONER

## 2024-07-19 PROCEDURE — 99284 EMERGENCY DEPT VISIT MOD MDM: CPT | Mod: 25

## 2024-07-19 PROCEDURE — 85025 COMPLETE CBC W/AUTO DIFF WBC: CPT | Performed by: NURSE PRACTITIONER

## 2024-07-19 PROCEDURE — 80048 BASIC METABOLIC PNL TOTAL CA: CPT | Performed by: NURSE PRACTITIONER

## 2024-07-19 RX ORDER — CLINDAMYCIN HYDROCHLORIDE 150 MG/1
300 CAPSULE ORAL 4 TIMES DAILY
Qty: 56 CAPSULE | Refills: 0 | Status: SHIPPED | OUTPATIENT
Start: 2024-07-19 | End: 2024-07-26

## 2024-07-19 RX ORDER — IBUPROFEN 400 MG/1
400 TABLET ORAL EVERY 8 HOURS PRN
Qty: 20 TABLET | Refills: 0 | Status: SHIPPED | OUTPATIENT
Start: 2024-07-19

## 2024-07-19 NOTE — ED PROVIDER NOTES
"Encounter Date: 7/19/2024       History     Chief Complaint   Patient presents with    Neck Pain     C/o neck pain and swelling. Pt states noticed swelling in neck yesterday.     39 year old female presents to ED with complaint of neck pain and swelling. Patient reports swelling has improved, but she noted swelling to left side of her neck on yesterday. She reports she then started having sharp pain to the left side of her jaw and head. Denies known fever, chills, chest pain, shortness of breath. She states she had a cough a few days ago due to sleeping with fan/air conditioner for her asthmatic son. Also notes having sick contacts in household. She reports being a "heart" patient and having concern of a blocked artery. PMH of mitral valve regurgitation, anxiety, depression.     The history is provided by the patient.     Review of patient's allergies indicates:  No Known Allergies  Past Medical History:   Diagnosis Date    Anxiety     Depression      Past Surgical History:   Procedure Laterality Date    BREAST SURGERY      LEFT HEART CATHETERIZATION N/A 3/19/2024    Procedure: Left heart cath;  Surgeon: August Guajardo MD;  Location: [facility] CATH LAB;  Service: Cardiology;  Laterality: N/A;     No family history on file.  Social History     Tobacco Use    Smoking status: Every Day     Current packs/day: 0.25     Types: Cigarettes     Passive exposure: Current    Smokeless tobacco: Never   Substance Use Topics    Alcohol use: Not Currently     Comment: occasionally    Drug use: Not Currently     Frequency: 2.0 times per week     Types: Marijuana, Hydrocodone     Comment: last smoked 4 days ago     Review of Systems   Constitutional:  Negative for chills and fever.   HENT:  Negative for facial swelling, sinus pressure, sinus pain and sore throat.    Respiratory:  Positive for cough. Negative for shortness of breath.    Cardiovascular:  Negative for chest pain and palpitations.   Gastrointestinal:  Negative for " nausea and vomiting.   Genitourinary:  Negative for dysuria and urgency.   Musculoskeletal:  Positive for neck pain. Negative for arthralgias and gait problem.   Skin:  Negative for color change and wound.   Neurological:  Positive for headaches. Negative for dizziness and weakness.   Hematological:  Negative for adenopathy. Does not bruise/bleed easily.   Psychiatric/Behavioral:  Negative for agitation, behavioral problems and confusion.    All other systems reviewed and are negative.      Physical Exam     Initial Vitals [07/19/24 1245]   BP Pulse Resp Temp SpO2   90/68 103 19 99.5 °F (37.5 °C) 99 %      MAP       --         Physical Exam    Nursing note and vitals reviewed.  Constitutional: She appears well-developed and well-nourished.   HENT:   Head: Normocephalic and atraumatic.   Mouth/Throat: Dental caries present. Posterior oropharyngeal erythema present.       Eyes: EOM are normal. Pupils are equal, round, and reactive to light.   Neck: Neck supple.       Normal range of motion.  Cardiovascular:  Normal rate and regular rhythm.           No murmur heard.  Pulmonary/Chest: She has no wheezes. She has no rhonchi.   Abdominal: Abdomen is soft. She exhibits no distension. There is no abdominal tenderness.   Musculoskeletal:         General: No tenderness or edema.      Cervical back: Normal range of motion and neck supple. No rigidity. No spinous process tenderness. Normal range of motion.     Lymphadenopathy:     She has cervical adenopathy.   Neurological: She is alert and oriented to person, place, and time. She displays normal reflexes. No cranial nerve deficit or sensory deficit.   Skin: Skin is warm and dry. Capillary refill takes less than 2 seconds.   Psychiatric: She has a normal mood and affect. Thought content normal.         Medical Screening Exam   See Full Note    ED Course   Procedures  Labs Reviewed   BASIC METABOLIC PANEL - Abnormal; Notable for the following components:       Result Value     Chloride 110 (*)     All other components within normal limits   CBC WITH DIFFERENTIAL - Abnormal; Notable for the following components:    RBC 5.61 (*)     MCV 72.0 (*)     MCH 22.5 (*)     MCHC 31.2 (*)     RDW 14.6 (*)     Neutrophils % 28.9 (*)     Lymphocytes % 58.3 (*)     Monocytes % 8.9 (*)     Neutrophils, Abs 1.59 (*)     All other components within normal limits   CBC MORPHOLOGY - Abnormal; Notable for the following components:    Platelet Morphology Few Large Platelets (*)     All other components within normal limits   INFLUENZA A & B BY MOLECULAR - Normal   SARS-COV-2 RNA AMPLIFICATION, QUAL - Normal    Narrative:     Negative SARS-CoV results should not be used as the sole basis for treatment or patient management decisions; negative results should be considered in the context of a patient's recent exposures, history and the presene of clinical signs and symptoms consistent with COVID-19.  Negative results should be treated as presumptive and confirmed by molecular assay, if necessary for patient management.   STREP A BY MOLECULAR METHOD - Normal   CBC W/ AUTO DIFFERENTIAL    Narrative:     The following orders were created for panel order CBC auto differential.  Procedure                               Abnormality         Status                     ---------                               -----------         ------                     CBC with Differential[[id number]]       Abnormal            Final result                 Please view results for these tests on the individual orders.          Imaging Results              X-Ray Chest PA And Lateral (Final result)  Result time 07/19/24 13:19:48      Final result by Guy Stoner II, MD (07/19/24 13:19:48)                   Impression:      No evidence of cardiopulmonary disease.      Electronically signed by: Guy Stoner  Date:    07/19/2024  Time:    13:19               Narrative:    EXAMINATION:  XR CHEST PA AND LATERAL    CLINICAL  "HISTORY:  Cough, unspecified    COMPARISON:  7 June 2024    TECHNIQUE:  XR CHEST PA AND LATERAL    FINDINGS:  The heart and mediastinum are normal in size and configuration.  The pulmonary vascularity is normal in caliber.  No lung infiltrates, effusions, pneumothorax or other abnormality is demonstrated.                                       Medications - No data to display  Medical Decision Making  39 year old female presents to ED with complaint of neck pain and swelling. Patient reports swelling has improved, but she noted swelling to left side of her neck on yesterday. She reports she then started having sharp pain to the left side of her jaw and head. Denies known fever, chills, chest pain, shortness of breath. She states she had a cough a few days ago due to sleeping with fan/air conditioner for her asthmatic son. Also notes having sick contacts in household. She reports being a "heart" patient and having concern of a blocked artery. PMH of mitral valve regurgitation, anxiety, depression.     Labs, diagnostics obtained. Prescriptions provided    Amount and/or Complexity of Data Reviewed  Labs: ordered.     Details: Chloride 110 (*)  All other components within normal limits  CBC WITH DIFFERENTIAL - Abnormal; Notable for the following components:  RBC 5.61 (*)  MCV 72.0 (*)  MCH 22.5 (*)  MCHC 31.2 (*)  RDW 14.6 (*)  Neutrophils % 28.9 (*)  Lymphocytes % 58.3 (*)  Monocytes % 8.9 (*)  Neutrophils, Abs 1.59 (*)  All other components within normal limits  CBC MORPHOLOGY - Abnormal; Notable for the following components:  Platelet Morphology Few Large Platelets (*)    Radiology: ordered.     Details: No evidence of cardiopulmonary disease.      Risk  Prescription drug management.                                      Clinical Impression:   Final diagnoses:  [R05.9] Cough  [I88.9] Lymphadenitis (Primary)        ED Disposition Condition    Discharge Stable          ED Prescriptions       Medication Sig Dispense Start " Date End Date Auth. Provider    clindamycin (CLEOCIN) 150 MG capsule Take 2 capsules (300 mg total) by mouth 4 (four) times daily. for 7 days 56 capsule 7/19/2024 7/26/2024 Mckenzie Menard FNP    ibuprofen (ADVIL,MOTRIN) 400 MG tablet Take 1 tablet (400 mg total) by mouth every 8 (eight) hours as needed for Pain. 20 tablet 7/19/2024 -- Mckenzie Menard FNP          Follow-up Information    None          Mckenzie Menard FNP  07/19/24 4213

## 2024-07-21 LAB
OHS QRS DURATION: 76 MS
OHS QTC CALCULATION: 462 MS

## 2024-07-29 ENCOUNTER — OFFICE VISIT (OUTPATIENT)
Dept: CARDIOLOGY | Facility: CLINIC | Age: 39
End: 2024-07-29
Payer: MEDICAID

## 2024-07-29 ENCOUNTER — HOSPITAL ENCOUNTER (OUTPATIENT)
Dept: CARDIOLOGY | Facility: HOSPITAL | Age: 39
Discharge: HOME | End: 2024-07-29
Attending: HOSPITALIST
Payer: MEDICAID

## 2024-07-29 VITALS
OXYGEN SATURATION: 98 % | WEIGHT: 125.19 LBS | HEART RATE: 84 BPM | HEIGHT: 62 IN | DIASTOLIC BLOOD PRESSURE: 60 MMHG | BODY MASS INDEX: 23.04 KG/M2 | SYSTOLIC BLOOD PRESSURE: 102 MMHG

## 2024-07-29 DIAGNOSIS — I20.89 ATYPICAL ANGINA: Primary | ICD-10-CM

## 2024-07-29 DIAGNOSIS — I20.89 ATYPICAL ANGINA: ICD-10-CM

## 2024-07-29 DIAGNOSIS — R00.2 PALPITATIONS: ICD-10-CM

## 2024-07-29 DIAGNOSIS — I50.9 NEW ONSET OF CONGESTIVE HEART FAILURE: ICD-10-CM

## 2024-07-29 DIAGNOSIS — I50.30 ACC/AHA STAGE B DIASTOLIC HEART FAILURE: ICD-10-CM

## 2024-07-29 PROBLEM — M25.512 PAIN IN JOINT OF LEFT SHOULDER: Status: RESOLVED | Noted: 2024-03-27 | Resolved: 2024-07-29

## 2024-07-29 PROCEDURE — 99215 OFFICE O/P EST HI 40 MIN: CPT | Mod: S$PBB,,, | Performed by: HOSPITALIST

## 2024-07-29 PROCEDURE — 99999 PR PBB SHADOW E&M-EST. PATIENT-LVL IV: CPT | Mod: PBBFAC,,, | Performed by: HOSPITALIST

## 2024-07-29 PROCEDURE — 3008F BODY MASS INDEX DOCD: CPT | Mod: CPTII,,, | Performed by: HOSPITALIST

## 2024-07-29 PROCEDURE — 1159F MED LIST DOCD IN RCRD: CPT | Mod: CPTII,,, | Performed by: HOSPITALIST

## 2024-07-29 PROCEDURE — 99214 OFFICE O/P EST MOD 30 MIN: CPT | Mod: PBBFAC,25 | Performed by: HOSPITALIST

## 2024-07-29 PROCEDURE — 93246 EXT ECG>7D<15D RECORDING: CPT

## 2024-07-29 PROCEDURE — 3074F SYST BP LT 130 MM HG: CPT | Mod: CPTII,,, | Performed by: HOSPITALIST

## 2024-07-29 PROCEDURE — 3078F DIAST BP <80 MM HG: CPT | Mod: CPTII,,, | Performed by: HOSPITALIST

## 2024-07-29 NOTE — PATIENT INSTRUCTIONS
-heart monitor put on in clinic today: take off on 8/12/24 and mail back: instructions given   -Follow up with nurse practitioner in same office as Dr. Guajardo for results: 8/29/24 at 1 P.M.  -Follow up with Dr. Guajardo in 6 months unless OrDHIRAJ smith decides otherwise on 8/29/24: you were put on a waitlist and will be mailed the appointment closer to January when the schedule is released.   -Stop smoking as discussed with Dr. Guajardo today

## 2024-07-29 NOTE — PROGRESS NOTES
CARDIOVASCULAR CONSULTATION        REASON FOR CONSULT:   Windy Alcocer is a 39 y.o. female who presents for   Chief Complaint   Patient presents with    Palpitations     Fluttering of heart with exertion and stress. Patient states she has also been having flutters on the left side carotid artery along with left jaw pain    Chest Pain     Occasion chest pains in center and right side of chest          HISTORY OF PRESENT ILLNESS:   39 y.o. female who  has a past medical history of Anxiety and Depression.    Today we discussed the patient's cardiac symptoms at length. We discussed her stress in life, her palpitations, her jugular veins, and her cervical lymphadenopathy (mild/inflammatory) - does not appear to be fixed or concerning for malignancy etc. We discussed smoking cessation at length - as she complains of symptoms, many, if not all of which, smoking is contributing to if not the sole cause of. Recommended NRT w/ name brand Nicorette 4mg White Ice mint gum. Patient amenable. We discussed 14 day monitor to eval palpitation etiology as holter showed 'transient flutter' - would like to establish burden etc. Patient amenable.    PAST MEDICAL HISTORY:     Past Medical History:   Diagnosis Date    Anxiety     Depression        PAST SURGICAL HISTORY:     Past Surgical History:   Procedure Laterality Date    BREAST SURGERY      LEFT HEART CATHETERIZATION N/A 3/19/2024    Procedure: Left heart cath;  Surgeon: August Guajardo MD;  Location: [facility] CATH LAB;  Service: Cardiology;  Laterality: N/A;       ALLERGIES AND MEDICATION:   Review of patient's allergies indicates:  No Known Allergies     Medication List            Accurate as of July 29, 2024  3:55 PM. If you have any questions, ask your nurse or doctor.                CONTINUE taking these medications      clonazePAM 1 MG tablet  Commonly known as: KlonoPIN  Take 1 tablet (1 mg total) by mouth once daily AND 1.5 tablets (1.5 mg total) every evening.      DEPO-PROVERA 150 mg/mL injection  Generic drug: medroxyPROGESTERone     EScitalopram oxalate 20 MG tablet  Commonly known as: LEXAPRO     HYDROcodone-acetaminophen 5-325 mg per tablet  Commonly known as: NORCO     ibuprofen 400 MG tablet  Commonly known as: ADVIL,MOTRIN  Take 1 tablet (400 mg total) by mouth every 8 (eight) hours as needed for Pain.     metoprolol succinate 25 MG 24 hr tablet  Commonly known as: TOPROL-XL  Take 1 tablet (25 mg total) by mouth once daily.              SOCIAL HISTORY:     Social History     Socioeconomic History    Marital status: Single   Tobacco Use    Smoking status: Every Day     Current packs/day: 0.25     Types: Cigarettes     Passive exposure: Current    Smokeless tobacco: Never   Substance and Sexual Activity    Alcohol use: Not Currently     Comment: occasionally    Drug use: Not Currently     Frequency: 2.0 times per week     Types: Marijuana, Hydrocodone     Comment: last smoked 4 days ago    Sexual activity: Yes     Partners: Male     Social Determinants of Health     Financial Resource Strain: Medium Risk (2/6/2024)    Overall Financial Resource Strain (CARDIA)     Difficulty of Paying Living Expenses: Somewhat hard   Food Insecurity: Food Insecurity Present (2/6/2024)    Hunger Vital Sign     Worried About Running Out of Food in the Last Year: Never true     Ran Out of Food in the Last Year: Sometimes true   Transportation Needs: Unmet Transportation Needs (2/6/2024)    PRAPARE - Transportation     Lack of Transportation (Medical): Yes     Lack of Transportation (Non-Medical): Yes   Physical Activity: Sufficiently Active (2/6/2024)    Exercise Vital Sign     Days of Exercise per Week: 7 days     Minutes of Exercise per Session: 30 min   Stress: Stress Concern Present (2/6/2024)    [employer] [employer] - Occupational Stress Questionnaire     Feeling of Stress : Very much   Housing Stability: High Risk (2/6/2024)    Housing Stability Vital Sign      "Unable to Pay for Housing in the Last Year: Yes     Number of Places Lived in the Last Year: 3     Unstable Housing in the Last Year: Yes       FAMILY HISTORY:     Family History   Problem Relation Name Age of Onset    Hypertension Mother      Hypertension Father         REVIEW OF SYSTEMS:       Cardiology focused 12-point ROS otherwise unremarkable except for as mentioned in HPI and A/P.   Pertinent Positives and Negatives documented herein.       PHYSICAL EXAM:     Vitals:    07/29/24 1342   BP: 102/60   Pulse: 84    Body mass index is 22.9 kg/m².  Weight: 56.8 kg (125 lb 3.2 oz)   Height: 5' 2" (157.5 cm)     Gen: NAD  HEENT: NC/AT, MMM; patient has mild cervical (anterior) lymphadenopathy, appears to be inflammation, no alarm features (not hard, not fixed, mobile and slightly enlarged)  Chest: normal wob  CV: RRR, no m/g/r  Ext: mild/trace edema of BLEs  Skin: no rash  Psych: AMAA  Neuro: CNGI        DATA:     Laboratory:  CBC:  Recent Labs   Lab 06/07/24  1105 06/09/24 2147 07/19/24  1316   WBC 4.58 5.54 5.49   Hemoglobin 11.0 L 11.2 L 12.6   Hematocrit 35.2 L 35.5 L 40.4   Platelet Count 138 L 131 L 154       CHEMISTRIES:  Recent Labs   Lab 01/20/23 2142 09/11/23  0749 10/24/23  1548 06/07/24  1105 06/09/24 2147 07/19/24  1316   Glucose 97 96   < > 86 88 96   Sodium 144 137   < > 137 141 140   Potassium 3.9 3.5   < > 4.1 3.9 4.3   BUN 16 13   < > 13 18 11   Creatinine 0.72 0.64   < > 0.63 0.99 0.78   Calcium 8.3 L 9.0   < > 8.2 L 8.8 9.1   Magnesium 2.1 2.1  --  2.2  --   --     < > = values in this interval not displayed.       CARDIAC BIOMARKERS:      No results found for: "BNP"    COAGS:  Recent Labs   Lab 05/17/24  1713   INR 1.01       LIPIDS/LFTS:  Recent Labs   Lab 01/12/24  1125 03/26/24  1455 06/07/24  1105 06/09/24 2147   Cholesterol  --  165  --   --    Triglycerides  --  68  --   --    HDL Cholesterol  --  50  --   --    LDL Calculated  --  101  --   --    Non-HDL  --  115  --   --    AST 21  " "--  9 L 11 L   ALT 28  --  13 12 L       No results found for: "HGBA1C"    TSH  Recent Labs   Lab 02/06/24  1017   TSH 0.603       The ASCVD Risk score (Wesley CHAVEZ, et al., 2019) failed to calculate for the following reasons:    The 2019 ASCVD risk score is only valid for ages 40 to 79     IMAGING  No orders to display       ASSESSMENT AND PLAN     Patient Active Problem List   Diagnosis    Anxiety    Reactive depression    Palpitations    Atypical angina    New onset of congestive heart failure    ACC/AHA stage B diastolic heart failure    Encounter to discuss test results    Anemia    Tobacco abuse counseling    Bruising         Orders Placed This Encounter   Procedures    Cardiac Monitor - 3-15 Day Adult (Cupid Only)       1. Palpitations  - Cardiac Monitor - 3-15 Day Adult (Cupid Only); Future    2. Atypical angina  - Cardiac Monitor - 3-15 Day Adult (Cupid Only); Future    3. New onset of congestive heart failure    4. ACC/AHA stage B diastolic heart failure        Problem Noted   Palpitations 1/9/2024    Update 7/29- we discussed ziopatch again given increased palpitation frequency and severity, prior, planned to place 14 day monitor if palpitations became more symptomatic, which they have  -14 day monitor to r/o underlying arrhythmia, establish pac/pvc burden, svt burden, afib/flutter etc     Tobacco Abuse Counseling 3/26/2024   Atypical Angina 3/11/2024   New Onset of Congestive Heart Failure 3/11/2024    Results for orders placed during the hospital encounter of 02/21/24      Non-ischemic cardiomyopathy - plan to discuss initiation/titration of GDMT for HFmEF (mid-range EF) at followup; no hypervolemia or HF exacerbations since last visit    Results for orders placed during the hospital encounter of 03/19/24    Cardiac catheterization    Conclusion    The pre-procedure left ventricular end diastolic pressure was 10.    The estimated blood loss was none.    There was non-obstructive coronary artery " disease..    Non-obstructive cors  Co-dominant circulation  Minor luminal irregularities only  R radial access, no complications    The procedure log was documented by Documenter: Nataly Sesay and verified by August Guajardo MD.    Date: 3/22/2024  Time: 9:13 PM    Echo    Interpretation Summary    Left Ventricle: The left ventricle is smaller than normal. Wall is thinner than normal. Regional wall motion abnormalities present. Difficult to name precise segments, but mid-apical lateral wall segments There is moderately reduced systolic function. Ejection fraction by visual approximation is 40%. There is diastolic dysfunction but grade cannot be determined.    Right Ventricle: Normal right ventricular cavity size. Systolic function is normal.    Aortic Valve: Mildly restricted motion. Aortic valve peak velocity is 1.17 m/s. Mean gradient is 3 mmHg.    Pulmonary Artery: The estimated pulmonary artery systolic pressure is 12 mmHg.    IVC/SVC: Normal venous pressure at 3 mmHg.       Acc/Aha Stage B Diastolic Heart Failure 3/11/2024    HFmEF - EF 40-45%   -palpitations w/ exertion, but now symptoms consistent w/ ACC/AHA stage B   -encouraged exercise, smoking cessation  -discussed narcotics - patient understands she cannot quit cold turkey, but we discussed taper- advised would be best if taper til off bc chronic narcotic therapy carries risk of multiple comorbid d/o and increased mortality      Pain in Joint of Left Shoulder (Resolved) 3/27/2024    Trial of diclofenac gel                     This note was dictated with the help of speech recognition software.  There might be un-intended errors and/or substitutions.

## 2024-07-30 ENCOUNTER — HOSPITAL ENCOUNTER (EMERGENCY)
Facility: HOSPITAL | Age: 39
Discharge: HOME | End: 2024-07-30
Payer: MEDICAID

## 2024-07-30 VITALS
TEMPERATURE: 99 F | RESPIRATION RATE: 18 BRPM | HEART RATE: 88 BPM | BODY MASS INDEX: 23 KG/M2 | SYSTOLIC BLOOD PRESSURE: 121 MMHG | HEIGHT: 62 IN | WEIGHT: 125 LBS | OXYGEN SATURATION: 99 % | DIASTOLIC BLOOD PRESSURE: 77 MMHG

## 2024-07-30 DIAGNOSIS — Z01.30 BLOOD PRESSURE CHECK: Primary | ICD-10-CM

## 2024-07-30 DIAGNOSIS — F41.9 ANXIETY: ICD-10-CM

## 2024-07-30 DIAGNOSIS — Z63.79 STRESSFUL LIFE EVENT AFFECTING FAMILY: ICD-10-CM

## 2024-07-30 PROCEDURE — 99281 EMR DPT VST MAYX REQ PHY/QHP: CPT

## 2024-07-30 PROCEDURE — 99282 EMERGENCY DEPT VISIT SF MDM: CPT | Mod: ,,,

## 2024-07-31 ENCOUNTER — PATIENT MESSAGE (OUTPATIENT)
Dept: FAMILY MEDICINE | Facility: CLINIC | Age: 39
End: 2024-07-31
Payer: MEDICAID

## 2024-07-31 DIAGNOSIS — F41.9 ANXIETY: Chronic | ICD-10-CM

## 2024-07-31 RX ORDER — CLONAZEPAM 1 MG/1
TABLET ORAL
Qty: 75 TABLET | Refills: 1 | Status: SHIPPED | OUTPATIENT
Start: 2024-07-31 | End: 2024-09-29

## 2024-08-07 ENCOUNTER — OFFICE VISIT (OUTPATIENT)
Dept: FAMILY MEDICINE | Facility: CLINIC | Age: 39
End: 2024-08-07
Payer: MEDICAID

## 2024-08-07 ENCOUNTER — PATIENT MESSAGE (OUTPATIENT)
Dept: CARDIOLOGY | Facility: CLINIC | Age: 39
End: 2024-08-07
Payer: MEDICAID

## 2024-08-07 VITALS
DIASTOLIC BLOOD PRESSURE: 77 MMHG | HEART RATE: 89 BPM | BODY MASS INDEX: 22.63 KG/M2 | HEIGHT: 62 IN | WEIGHT: 123 LBS | SYSTOLIC BLOOD PRESSURE: 117 MMHG

## 2024-08-07 DIAGNOSIS — M54.2 NECK PAIN: ICD-10-CM

## 2024-08-07 DIAGNOSIS — R59.1 LYMPHADENOPATHY: ICD-10-CM

## 2024-08-07 DIAGNOSIS — F41.9 ANXIETY: Primary | ICD-10-CM

## 2024-08-07 PROCEDURE — 85025 COMPLETE CBC W/AUTO DIFF WBC: CPT | Mod: ,,, | Performed by: CLINICAL MEDICAL LABORATORY

## 2024-08-07 PROCEDURE — 3078F DIAST BP <80 MM HG: CPT | Mod: CPTII,,, | Performed by: FAMILY MEDICINE

## 2024-08-07 PROCEDURE — 84443 ASSAY THYROID STIM HORMONE: CPT | Mod: ,,, | Performed by: CLINICAL MEDICAL LABORATORY

## 2024-08-07 PROCEDURE — 80053 COMPREHEN METABOLIC PANEL: CPT | Mod: ,,, | Performed by: CLINICAL MEDICAL LABORATORY

## 2024-08-07 PROCEDURE — 99214 OFFICE O/P EST MOD 30 MIN: CPT | Mod: ,,, | Performed by: FAMILY MEDICINE

## 2024-08-07 PROCEDURE — 3008F BODY MASS INDEX DOCD: CPT | Mod: CPTII,,, | Performed by: FAMILY MEDICINE

## 2024-08-07 PROCEDURE — 3074F SYST BP LT 130 MM HG: CPT | Mod: CPTII,,, | Performed by: FAMILY MEDICINE

## 2024-08-07 PROCEDURE — 1159F MED LIST DOCD IN RCRD: CPT | Mod: CPTII,,, | Performed by: FAMILY MEDICINE

## 2024-08-07 RX ORDER — HYDROCODONE BITARTRATE AND ACETAMINOPHEN 5; 325 MG/1; MG/1
1 TABLET ORAL EVERY 6 HOURS PRN
Qty: 20 TABLET | Refills: 0 | Status: SHIPPED | OUTPATIENT
Start: 2024-08-07 | End: 2024-08-12

## 2024-08-08 LAB
ALBUMIN SERPL BCP-MCNC: 4.3 G/DL (ref 3.5–5)
ALBUMIN/GLOB SERPL: 1.3 {RATIO}
ALP SERPL-CCNC: 54 U/L (ref 37–98)
ALT SERPL W P-5'-P-CCNC: 30 U/L (ref 13–56)
ANION GAP SERPL CALCULATED.3IONS-SCNC: 11 MMOL/L (ref 7–16)
AST SERPL W P-5'-P-CCNC: 13 U/L (ref 15–37)
BASOPHILS # BLD AUTO: 0.02 K/UL (ref 0–0.2)
BASOPHILS NFR BLD AUTO: 0.3 % (ref 0–1)
BILIRUB SERPL-MCNC: 0.6 MG/DL (ref ?–1.2)
BUN SERPL-MCNC: 10 MG/DL (ref 7–18)
BUN/CREAT SERPL: 14 (ref 6–20)
CALCIUM SERPL-MCNC: 8.7 MG/DL (ref 8.5–10.1)
CHLORIDE SERPL-SCNC: 108 MMOL/L (ref 98–107)
CO2 SERPL-SCNC: 24 MMOL/L (ref 21–32)
CREAT SERPL-MCNC: 0.74 MG/DL (ref 0.55–1.02)
DIFFERENTIAL METHOD BLD: ABNORMAL
EGFR (NO RACE VARIABLE) (RUSH/TITUS): 106 ML/MIN/1.73M2
EOSINOPHIL # BLD AUTO: 0.23 K/UL (ref 0–0.5)
EOSINOPHIL NFR BLD AUTO: 4 % (ref 1–4)
ERYTHROCYTE [DISTWIDTH] IN BLOOD BY AUTOMATED COUNT: 15 % (ref 11.5–14.5)
GLOBULIN SER-MCNC: 3.3 G/DL (ref 2–4)
GLUCOSE SERPL-MCNC: 89 MG/DL (ref 74–106)
HCT VFR BLD AUTO: 37.7 % (ref 38–47)
HGB BLD-MCNC: 11.9 G/DL (ref 12–16)
IMM GRANULOCYTES # BLD AUTO: 0.03 K/UL (ref 0–0.04)
IMM GRANULOCYTES NFR BLD: 0.5 % (ref 0–0.4)
LYMPHOCYTES # BLD AUTO: 3.19 K/UL (ref 1–4.8)
LYMPHOCYTES NFR BLD AUTO: 55.6 % (ref 27–41)
MCH RBC QN AUTO: 22.5 PG (ref 27–31)
MCHC RBC AUTO-ENTMCNC: 31.6 G/DL (ref 32–36)
MCV RBC AUTO: 71.4 FL (ref 80–96)
MICROCYTES BLD QL SMEAR: ABNORMAL
MONOCYTES # BLD AUTO: 0.45 K/UL (ref 0–0.8)
MONOCYTES NFR BLD AUTO: 7.8 % (ref 2–6)
MPC BLD CALC-MCNC: ABNORMAL G/DL
NEUTROPHILS # BLD AUTO: 1.82 K/UL (ref 1.8–7.7)
NEUTROPHILS NFR BLD AUTO: 31.8 % (ref 53–65)
NRBC # BLD AUTO: 0.02 X10E3/UL
NRBC, AUTO (.00): 0.3 %
OVALOCYTES BLD QL SMEAR: ABNORMAL
PLATELET # BLD AUTO: 138 K/UL (ref 150–400)
PLATELET MORPHOLOGY: ABNORMAL
POTASSIUM SERPL-SCNC: 3.8 MMOL/L (ref 3.5–5.1)
PROT SERPL-MCNC: 7.6 G/DL (ref 6.4–8.2)
RBC # BLD AUTO: 5.28 M/UL (ref 4.2–5.4)
SODIUM SERPL-SCNC: 139 MMOL/L (ref 136–145)
TSH SERPL DL<=0.005 MIU/L-ACNC: 1.09 UIU/ML (ref 0.36–3.74)
WBC # BLD AUTO: 5.74 K/UL (ref 4.5–11)

## 2024-08-09 ENCOUNTER — HOSPITAL ENCOUNTER (EMERGENCY)
Facility: HOSPITAL | Age: 39
Discharge: LEFT BEFORE DISCHARGE | End: 2024-08-09
Payer: MEDICAID

## 2024-08-09 VITALS
DIASTOLIC BLOOD PRESSURE: 81 MMHG | SYSTOLIC BLOOD PRESSURE: 114 MMHG | BODY MASS INDEX: 22.63 KG/M2 | OXYGEN SATURATION: 100 % | RESPIRATION RATE: 16 BRPM | HEIGHT: 62 IN | TEMPERATURE: 99 F | WEIGHT: 123 LBS | HEART RATE: 96 BPM

## 2024-08-12 ENCOUNTER — PATIENT MESSAGE (OUTPATIENT)
Dept: FAMILY MEDICINE | Facility: CLINIC | Age: 39
End: 2024-08-12
Payer: MEDICAID

## 2024-08-13 ENCOUNTER — PATIENT MESSAGE (OUTPATIENT)
Dept: FAMILY MEDICINE | Facility: CLINIC | Age: 39
End: 2024-08-13
Payer: MEDICAID

## 2024-08-20 ENCOUNTER — OFFICE VISIT (OUTPATIENT)
Dept: FAMILY MEDICINE | Facility: CLINIC | Age: 39
End: 2024-08-20
Payer: MEDICAID

## 2024-08-20 ENCOUNTER — PATIENT MESSAGE (OUTPATIENT)
Dept: FAMILY MEDICINE | Facility: CLINIC | Age: 39
End: 2024-08-20
Payer: MEDICAID

## 2024-08-20 VITALS
HEART RATE: 106 BPM | HEIGHT: 62 IN | SYSTOLIC BLOOD PRESSURE: 123 MMHG | BODY MASS INDEX: 22.26 KG/M2 | DIASTOLIC BLOOD PRESSURE: 78 MMHG | WEIGHT: 121 LBS

## 2024-08-20 DIAGNOSIS — M54.2 NECK PAIN: Primary | ICD-10-CM

## 2024-08-20 DIAGNOSIS — D69.6 THROMBOCYTOPENIA: ICD-10-CM

## 2024-08-20 PROCEDURE — 99213 OFFICE O/P EST LOW 20 MIN: CPT | Mod: 25,GC,, | Performed by: FAMILY MEDICINE

## 2024-08-20 PROCEDURE — 1159F MED LIST DOCD IN RCRD: CPT | Mod: CPTII,,, | Performed by: FAMILY MEDICINE

## 2024-08-20 PROCEDURE — 3074F SYST BP LT 130 MM HG: CPT | Mod: CPTII,,, | Performed by: FAMILY MEDICINE

## 2024-08-20 PROCEDURE — 3078F DIAST BP <80 MM HG: CPT | Mod: CPTII,,, | Performed by: FAMILY MEDICINE

## 2024-08-20 PROCEDURE — 96372 THER/PROPH/DIAG INJ SC/IM: CPT | Mod: GC,,, | Performed by: FAMILY MEDICINE

## 2024-08-20 PROCEDURE — 3008F BODY MASS INDEX DOCD: CPT | Mod: CPTII,,, | Performed by: FAMILY MEDICINE

## 2024-08-20 RX ORDER — HYDROCODONE BITARTRATE AND ACETAMINOPHEN 7.5; 325 MG/1; MG/1
1 TABLET ORAL EVERY 8 HOURS PRN
Qty: 12 TABLET | Refills: 0 | Status: SHIPPED | OUTPATIENT
Start: 2024-08-20 | End: 2024-08-24

## 2024-08-20 RX ORDER — KETOROLAC TROMETHAMINE 30 MG/ML
30 INJECTION, SOLUTION INTRAMUSCULAR; INTRAVENOUS
Status: COMPLETED | OUTPATIENT
Start: 2024-08-20 | End: 2024-08-20

## 2024-08-20 RX ORDER — KETOROLAC TROMETHAMINE 30 MG/ML
30 INJECTION, SOLUTION INTRAMUSCULAR; INTRAVENOUS
Status: DISCONTINUED | OUTPATIENT
Start: 2024-08-20 | End: 2024-08-20

## 2024-08-20 RX ORDER — HYDROCODONE BITARTRATE AND ACETAMINOPHEN 7.5; 325 MG/1; MG/1
1 TABLET ORAL EVERY 8 HOURS PRN
Qty: 12 TABLET | Refills: 0 | Status: SHIPPED | OUTPATIENT
Start: 2024-08-20 | End: 2024-08-20

## 2024-08-20 RX ADMIN — KETOROLAC TROMETHAMINE 30 MG: 30 INJECTION, SOLUTION INTRAMUSCULAR; INTRAVENOUS at 09:08

## 2024-08-20 NOTE — ASSESSMENT & PLAN NOTE
Patient endorses left side neck pain that radiates to her shoulder. Patient states that the pain is present for about a month. First time she noticed was when she was driving to  a family member in [place]. 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   States that the pain is constant and gets worse with left arm movement.   Denies tingling or numbness.   Physical examination with point of tenderness in the left side of the neck. (Bump noted in same location).   Denies any trauma or fall.     Ketorolac 30 mg IM given today at the clinic.

## 2024-08-20 NOTE — ASSESSMENT & PLAN NOTE
Patient with Thrombocytopenia.  Referral to hematologist sent today. Dr. Wang.     Lab Results   Component Value Date     (L) 08/07/2024               Component Ref Range & Units 2 mo ago  (6/9/24) 2 mo ago  (6/7/24) 5 mo ago  (3/11/24) 7 mo ago  (1/12/24) 7 mo ago  (1/12/24) 10 mo ago  (10/24/23)   Platelet Count 150 - 400 K/uL 131 Low  138 Low  176  222 181              Component Ref Range & Units 1 mo ago  (7/19/24) 2 mo ago  (6/9/24) 2 mo ago  (6/7/24) 5 mo ago  (3/11/24) 7 mo ago  (1/12/24) 10 mo ago  (10/24/23) 11 mo ago  (9/11/23)   Platelet Morphology Normal Few Large Platelets Abnormal  Decreased Abnormal  CM Large & Giant Platelets Abnormal  Few Large Platelets Abnormal  Few Large Platelets Abnormal  Few Large Platelets Abnormal  Few Large Platelets Abnormal

## 2024-08-20 NOTE — PROGRESS NOTES
Subjective:       Patient ID: Windy Alcocer is a 39 y.o. female.    Chief Complaint: Neck Pain    39 year old female comes to the clinic today with complaint of left side neck pain that radiates to her shoulder. Patient states that the pain is present for about a month. First time she noticed was when she was driving to  a family member in [place]. 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          Current Outpatient Medications:     clonazePAM (KLONOPIN) 1 MG tablet, Take 1 tablet (1 mg total) by mouth once daily AND 1.5 tablets (1.5 mg total) every evening., Disp: 75 tablet, Rfl: 1  No current facility-administered medications for this visit.    Review of patient's allergies indicates:  No Known Allergies    Past Medical History:   Diagnosis Date    Anxiety     Depression        Past Surgical History:   Procedure Laterality Date    BREAST SURGERY      LEFT HEART CATHETERIZATION N/A 3/19/2024    Procedure: Left heart cath;  Surgeon: August Guajardo MD;  Location: [facility] CATH LAB;  Service: Cardiology;  Laterality: N/A;       Family History   Problem Relation Name Age of Onset    Hypertension Mother      Hypertension Father         Social History     Tobacco Use    Smoking status: Every Day     Current packs/day: 0.25     Types: Cigarettes     Passive exposure: Current    Smokeless tobacco: Never   Substance Use Topics    Alcohol use: Not Currently     Comment: occasionally    Drug use: Not Currently     Frequency: 2.0 times per week     Types: Marijuana, Hydrocodone     Comment: last smoked 4 days ago       Review of Systems   Constitutional:  Negative for chills and fever.   HENT:  Negative for ear pain.    Respiratory:  Negative for cough, chest tightness and shortness of breath.    Cardiovascular:  Negative for chest pain.   Gastrointestinal:  Negative for abdominal pain, diarrhea, nausea and vomiting.   Musculoskeletal:   "Positive for myalgias and neck pain (Radiotes to shoulder worse with activity).   Neurological:  Negative for headaches.           Objective:      Vitals:    08/20/24 0827   BP: 123/78   Pulse: 106   Weight: 54.9 kg (121 lb)   Height: 5' 2" (1.575 m)     Physical Exam  Vitals reviewed.   Constitutional:       General: She is not in acute distress.     Appearance: Normal appearance.   HENT:      Head: Normocephalic and atraumatic.      Right Ear: External ear normal.      Left Ear: External ear normal.      Nose: Nose normal. No congestion.      Mouth/Throat:      Mouth: Mucous membranes are moist.   Eyes:      Conjunctiva/sclera: Conjunctivae normal.   Neck:        Comments: Bump noted with tenderness.   Cardiovascular:      Rate and Rhythm: Normal rate and regular rhythm.      Heart sounds: Normal heart sounds.   Pulmonary:      Effort: Pulmonary effort is normal. No respiratory distress.      Breath sounds: Normal breath sounds.   Abdominal:      General: Bowel sounds are normal.   Musculoskeletal:         General: Normal range of motion.      Cervical back: Normal range of motion.   Skin:     General: Skin is warm.      Coloration: Skin is not jaundiced.   Neurological:      General: No focal deficit present.      Mental Status: She is alert.   Psychiatric:         Mood and Affect: Mood normal.         Behavior: Behavior normal.         Thought Content: Thought content normal.           Lab Results   Component Value Date    WBC 5.74 08/07/2024    HGB 11.9 (L) 08/07/2024    HCT 37.7 (L) 08/07/2024     (L) 08/07/2024    CHOL 165 03/26/2024    TRIG 68 03/26/2024    HDL 50 03/26/2024    ALT 30 08/07/2024    AST 13 (L) 08/07/2024     08/07/2024    K 3.8 08/07/2024     (H) 08/07/2024    CREATININE 0.74 08/07/2024    BUN 10 08/07/2024    CO2 24 08/07/2024    TSH 1.090 08/07/2024    INR 1.01 05/17/2024      Assessment:       1. Neck pain    2. Thrombocytopenia        Plan:         Problem List Items " Addressed This Visit       Thrombocytopenia     Patient with chronic Thrombocytopenia.  Referral to hematologist sent today. Dr. Wang.     Lab Results   Component Value Date     (L) 08/07/2024               Component Ref Range & Units 2 mo ago  (6/9/24) 2 mo ago  (6/7/24) 5 mo ago  (3/11/24) 7 mo ago  (1/12/24) 7 mo ago  (1/12/24) 10 mo ago  (10/24/23)   Platelet Count 150 - 400 K/uL 131 Low  138 Low  176  222 181              Component Ref Range & Units 1 mo ago  (7/19/24) 2 mo ago  (6/9/24) 2 mo ago  (6/7/24) 5 mo ago  (3/11/24) 7 mo ago  (1/12/24) 10 mo ago  (10/24/23) 11 mo ago  (9/11/23)   Platelet Morphology Normal Few Large Platelets Abnormal  Decreased Abnormal  CM Large & Giant Platelets Abnormal  Few Large Platelets Abnormal  Few Large Platelets Abnormal  Few Large Platelets Abnormal  Few Large Platelets Abnormal               Relevant Orders    Ambulatory referral/consult to Hematology / Oncology    Neck pain - Primary     Patient endorses left side neck pain that radiates to her shoulder. Patient states that the pain is present for about a month. First time she noticed was when she was driving to  a family member in [place]. 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   States that the pain is constant and gets worse with left arm movement.   Denies tingling or numbness.   Physical examination with point of tenderness in the left side of the neck. (Bump noted in same location).   Denies any trauma or fall.     Ketorolac 30 mg IM given today at the clinic.          Relevant Medications    ketorolac injection 30 mg (Completed)         No follow-ups on file.    Omega Walters MD     Instructed patient that if symptoms fail to improve or worsen patient should seek immediate medical attention or report to the nearest emergency department. Patient expressed verbal agreement and understanding to this plan of care.

## 2024-08-20 NOTE — ADDENDUM NOTE
Addended by: VALENTÍN KENNY on: 8/20/2024 01:39 PM     Modules accepted: Orders     PCP: Addi Corral APRN - NP    Last appt: 2/22/2024    Future Appointments   Date Time Provider Department Center   5/23/2024  3:30 PM Addi Corral APRN - NP [facility]   6/19/2024  9:10 AM Ramu Hammond MD [facility]       Requested Prescriptions     Pending Prescriptions Disp Refills    metFORMIN (GLUCOPHAGE) 1000 MG tablet [Pharmacy Med Name: Metformin Hydrochloride 1000mg Tablet] 180 tablet 1     Sig: Take 1 tablet by mouth twice daily.    omeprazole (PRILOSEC) 20 MG delayed release capsule [Pharmacy Med Name: Omeprazole 20mg Delayed-Release Capsule] 90 capsule 1     Sig: Take 1 capsule by mouth once daily.    simvastatin (ZOCOR) 40 MG tablet [Pharmacy Med Name: Simvastatin 40mg Tablet] 90 tablet 1     Sig: Take 1 tablet by mouth daily.

## 2024-08-26 ENCOUNTER — TELEPHONE (OUTPATIENT)
Dept: FAMILY MEDICINE | Facility: CLINIC | Age: 39
End: 2024-08-26
Payer: MEDICAID

## 2024-08-27 ENCOUNTER — PATIENT MESSAGE (OUTPATIENT)
Dept: FAMILY MEDICINE | Facility: CLINIC | Age: 39
End: 2024-08-27
Payer: MEDICAID

## 2024-08-27 DIAGNOSIS — F41.9 ANXIETY: Chronic | ICD-10-CM

## 2024-08-27 RX ORDER — CLONAZEPAM 1 MG/1
TABLET ORAL
Qty: 75 TABLET | Refills: 1 | Status: SHIPPED | OUTPATIENT
Start: 2024-08-27 | End: 2024-10-26

## 2024-08-29 ENCOUNTER — OFFICE VISIT (OUTPATIENT)
Dept: CARDIOLOGY | Facility: CLINIC | Age: 39
End: 2024-08-29
Payer: MEDICAID

## 2024-08-29 VITALS
DIASTOLIC BLOOD PRESSURE: 64 MMHG | SYSTOLIC BLOOD PRESSURE: 93 MMHG | WEIGHT: 121.81 LBS | HEART RATE: 91 BPM | HEIGHT: 62 IN | OXYGEN SATURATION: 98 % | BODY MASS INDEX: 22.41 KG/M2

## 2024-08-29 DIAGNOSIS — I50.41 ACUTE COMBINED SYSTOLIC AND DIASTOLIC HEART FAILURE: Primary | ICD-10-CM

## 2024-08-29 DIAGNOSIS — F17.200 TOBACCO DEPENDENCE: ICD-10-CM

## 2024-08-29 PROCEDURE — 3008F BODY MASS INDEX DOCD: CPT | Mod: CPTII,,, | Performed by: NURSE PRACTITIONER

## 2024-08-29 PROCEDURE — 1159F MED LIST DOCD IN RCRD: CPT | Mod: CPTII,,, | Performed by: NURSE PRACTITIONER

## 2024-08-29 PROCEDURE — 99214 OFFICE O/P EST MOD 30 MIN: CPT | Mod: S$PBB,,, | Performed by: NURSE PRACTITIONER

## 2024-08-29 PROCEDURE — 99999 PR PBB SHADOW E&M-EST. PATIENT-LVL III: CPT | Mod: PBBFAC,,, | Performed by: NURSE PRACTITIONER

## 2024-08-29 PROCEDURE — 3078F DIAST BP <80 MM HG: CPT | Mod: CPTII,,, | Performed by: NURSE PRACTITIONER

## 2024-08-29 PROCEDURE — 99213 OFFICE O/P EST LOW 20 MIN: CPT | Mod: PBBFAC | Performed by: NURSE PRACTITIONER

## 2024-08-29 PROCEDURE — 3074F SYST BP LT 130 MM HG: CPT | Mod: CPTII,,, | Performed by: NURSE PRACTITIONER

## 2024-08-29 NOTE — PROGRESS NOTES
PCP: Ezra Morrison IV, DO    Referring Provider:     Subjective:   Windy Alcocer is a 39 y.o. female, current smoker, with hx of anxiety and depression who presents for follow up.    Pt was last seen by Dr. Guajardo 7/29/24 for palpitations and chest pain. ZIO cardiac monitor was placed. ZIO results are normal. She had a LHC 03/2024 that showed mild nonobstructive CAD. Echo 02/2024 showed LVEF 40% and LVDD (Grade undetermined).     Today, pt is doing well. Denies chest pain, SOB or any other cardiac complaints today. She states she has quit marijuana and is trying to quit smoking.         Fhx: No premature CAD  Shx: current smoker, occasional etoh use, states she has quit marijuana    EKG   Results for orders placed or performed in visit on 07/05/24   EKG 12-lead    Collection Time: 07/05/24  5:18 PM   Result Value Ref Range    QRS Duration 76 ms    OHS QTC Calculation 462 ms    Narrative    Test Reason :     Vent. Rate : 098 BPM     Atrial Rate : 098 BPM     P-R Int : 192 ms          QRS Dur : 076 ms      QT Int : 362 ms       P-R-T Axes : 071 -11 060 degrees     QTc Int : 462 ms    Normal sinus rhythm  Possible Left atrial enlargement  Cannot rule out Anterior infarct ,age undetermined  Abnormal ECG  When compared with ECG of 09-JUN-2024 21:45,  PREVIOUS ECG IS PRESENT  Confirmed by Sidney PARKER, Reinaldo GROSS ([id number]) on 7/21/2024 1:10:44 AM    Referred By: [email or web address]   SELF           Confirmed By:Reinaldo Little MD     ECHO Results for orders placed during the hospital encounter of 05/28/24    Echo    Interpretation Summary    Left Ventricle: The left ventricle is normal in size. Normal wall thickness. There is normal systolic function. Grade I diastolic dysfunction.    Right Ventricle: Normal right ventricular cavity size. Systolic function is borderline low.    Aortic Valve: The aortic valve is a trileaflet valve.    Pulmonic Valve: There is mild regurgitation.    Pulmonary Artery: The estimated pulmonary artery systolic  pressure is 15 mmHg.    IVC/SVC: Normal venous pressure at 3 mmHg.    [facility] Results for orders placed during the hospital encounter of 03/19/24    Cardiac catheterization    Conclusion    The pre-procedure left ventricular end diastolic pressure was 10.    The estimated blood loss was none.    There was non-obstructive coronary artery disease..    Non-obstructive cors  Co-dominant circulation  Minor luminal irregularities only  R radial access, no complications    The procedure log was documented by Documenter: Nataly Sesay and verified by August Guajardo MD.    Date: 3/22/2024  Time: 9:13 PM        Lab Results   Component Value Date     08/07/2024    K 3.8 08/07/2024     (H) 08/07/2024    CO2 24 08/07/2024    BUN 10 08/07/2024    CREATININE 0.74 08/07/2024    CALCIUM 8.7 08/07/2024    ANIONGAP 11 08/07/2024       Lab Results   Component Value Date    CHOL 165 03/26/2024     Lab Results   Component Value Date    HDL 50 03/26/2024     Lab Results   Component Value Date    LDLCALC 101 03/26/2024     Lab Results   Component Value Date    TRIG 68 03/26/2024     Lab Results   Component Value Date    CHOLHDL 3.3 03/26/2024       Lab Results   Component Value Date    WBC 5.74 08/07/2024    HGB 11.9 (L) 08/07/2024    HCT 37.7 (L) 08/07/2024    MCV 71.4 (L) 08/07/2024     (L) 08/07/2024           Current Outpatient Medications:     clonazePAM (KLONOPIN) 1 MG tablet, Take 1 tablet (1 mg total) by mouth once daily AND 1.5 tablets (1.5 mg total) every evening., Disp: 75 tablet, Rfl: 1    Review of Systems   Constitutional:  Negative for chills, diaphoresis, fever and malaise/fatigue.   Respiratory:  Negative for cough and shortness of breath.    Cardiovascular:  Negative for chest pain, palpitations, orthopnea, leg swelling and PND.   Gastrointestinal:  Negative for abdominal pain, nausea and vomiting.   Musculoskeletal:  Negative for falls.   Neurological:  Negative for focal weakness and weakness.  "        Objective:   BP 93/64 (BP Location: Left arm, Patient Position: Sitting)   Pulse 91   Ht 5' 2" (1.575 m)   Wt 55.2 kg (121 lb 12.8 oz)   SpO2 98%   BMI 22.28 kg/m²     Physical Exam  Constitutional:       General: She is not in acute distress.     Appearance: Normal appearance.   Cardiovascular:      Rate and Rhythm: Normal rate and regular rhythm.   Pulmonary:      Effort: Pulmonary effort is normal.      Breath sounds: Normal breath sounds.   Musculoskeletal:      Cervical back: Neck supple. No rigidity.      Right lower leg: No edema.      Left lower leg: No edema.   Skin:     General: Skin is warm and dry.   Neurological:      Mental Status: She is alert.           Assessment:     1. Acute combined systolic and diastolic heart failure        2. Tobacco dependence              Plan:   Acute combined systolic and diastolic heart failure  - LVEF 40% and LVDD - Grade undetermined  - euvolemic  - NYHA class I, stage B  - BP today is 93/64 - BP will not tolerate GDMT    Tobacco dependence  - smoking cessation discussed  - states she is trying to quit, has decreased amount of cigs per day    Follow up with Dr. Guajardo in 6 months      "

## 2024-08-30 PROBLEM — I50.41 ACUTE COMBINED SYSTOLIC AND DIASTOLIC HEART FAILURE: Status: ACTIVE | Noted: 2024-03-11

## 2024-08-30 PROBLEM — F17.200 TOBACCO DEPENDENCE: Status: ACTIVE | Noted: 2024-08-30

## 2024-08-30 NOTE — ASSESSMENT & PLAN NOTE
- smoking cessation discussed  - states she is trying to quit, has decreased amount of cigs per day

## 2024-08-30 NOTE — ASSESSMENT & PLAN NOTE
- LVEF 40% and LVDD - Grade undetermined  - euvolemic  - NYHA class I, stage B  - BP today is 93/64 - BP will not tolerate GDMT

## 2024-09-03 ENCOUNTER — HOSPITAL ENCOUNTER (EMERGENCY)
Facility: HOSPITAL | Age: 39
Discharge: HOME | End: 2024-09-04
Payer: MEDICAID

## 2024-09-03 DIAGNOSIS — R06.02 SHORTNESS OF BREATH: ICD-10-CM

## 2024-09-03 DIAGNOSIS — M79.602 PAIN OF LEFT UPPER EXTREMITY: Primary | ICD-10-CM

## 2024-09-03 PROCEDURE — 99284 EMERGENCY DEPT VISIT MOD MDM: CPT | Mod: 25

## 2024-09-03 PROCEDURE — 99284 EMERGENCY DEPT VISIT MOD MDM: CPT | Mod: ,,, | Performed by: NURSE PRACTITIONER

## 2024-09-03 PROCEDURE — 82077 ASSAY SPEC XCP UR&BREATH IA: CPT | Performed by: NURSE PRACTITIONER

## 2024-09-03 PROCEDURE — 81003 URINALYSIS AUTO W/O SCOPE: CPT | Performed by: NURSE PRACTITIONER

## 2024-09-03 PROCEDURE — 80053 COMPREHEN METABOLIC PANEL: CPT | Performed by: NURSE PRACTITIONER

## 2024-09-03 PROCEDURE — 93005 ELECTROCARDIOGRAM TRACING: CPT

## 2024-09-03 PROCEDURE — 85025 COMPLETE CBC W/AUTO DIFF WBC: CPT | Performed by: NURSE PRACTITIONER

## 2024-09-03 PROCEDURE — 80307 DRUG TEST PRSMV CHEM ANLYZR: CPT | Performed by: NURSE PRACTITIONER

## 2024-09-03 RX ORDER — HYDROCODONE BITARTRATE AND ACETAMINOPHEN 10; 325 MG/1; MG/1
1 TABLET ORAL
COMMUNITY

## 2024-09-04 VITALS
SYSTOLIC BLOOD PRESSURE: 105 MMHG | HEIGHT: 62 IN | RESPIRATION RATE: 18 BRPM | DIASTOLIC BLOOD PRESSURE: 71 MMHG | WEIGHT: 126.81 LBS | OXYGEN SATURATION: 100 % | HEART RATE: 83 BPM | BODY MASS INDEX: 23.34 KG/M2 | TEMPERATURE: 98 F

## 2024-09-04 LAB
ALBUMIN SERPL BCP-MCNC: 3.6 G/DL (ref 3.5–5)
ALBUMIN/GLOB SERPL: 1.3 {RATIO}
ALP SERPL-CCNC: 70 U/L (ref 37–98)
ALT SERPL W P-5'-P-CCNC: 13 U/L (ref 13–56)
AMPHET UR QL SCN: NEGATIVE
ANION GAP SERPL CALCULATED.3IONS-SCNC: 11 MMOL/L (ref 7–16)
ANISOCYTOSIS BLD QL SMEAR: ABNORMAL
AST SERPL W P-5'-P-CCNC: 8 U/L (ref 15–37)
BARBITURATES UR QL SCN: NEGATIVE
BASOPHILS # BLD AUTO: 0.02 K/UL (ref 0–0.2)
BASOPHILS NFR BLD AUTO: 0.3 % (ref 0–1)
BENZODIAZ METAB UR QL SCN: NEGATIVE
BILIRUB SERPL-MCNC: 0.4 MG/DL (ref ?–1.2)
BILIRUB UR QL STRIP: NEGATIVE
BUN SERPL-MCNC: 12 MG/DL (ref 7–18)
BUN/CREAT SERPL: 19 (ref 6–20)
CALCIUM SERPL-MCNC: 8.2 MG/DL (ref 8.5–10.1)
CANNABINOIDS UR QL SCN: NEGATIVE
CHLORIDE SERPL-SCNC: 106 MMOL/L (ref 98–107)
CLARITY UR: CLEAR
CO2 SERPL-SCNC: 28 MMOL/L (ref 21–32)
COCAINE UR QL SCN: NEGATIVE
COLOR UR: YELLOW
CREAT SERPL-MCNC: 0.64 MG/DL (ref 0.55–1.02)
DIFFERENTIAL METHOD BLD: ABNORMAL
EGFR (NO RACE VARIABLE) (RUSH/TITUS): 116 ML/MIN/1.73M2
EOSINOPHIL # BLD AUTO: 0.25 K/UL (ref 0–0.5)
EOSINOPHIL NFR BLD AUTO: 4.1 % (ref 1–4)
ERYTHROCYTE [DISTWIDTH] IN BLOOD BY AUTOMATED COUNT: 14.7 % (ref 11.5–14.5)
ETHANOL, BLOOD (CATEGORY): NOT DETECTED
GLOBULIN SER-MCNC: 2.8 G/DL (ref 2–4)
GLUCOSE SERPL-MCNC: 90 MG/DL (ref 74–106)
GLUCOSE UR STRIP-MCNC: NEGATIVE MG/DL
HCT VFR BLD AUTO: 32.9 % (ref 38–47)
HGB BLD-MCNC: 10.4 G/DL (ref 12–16)
HYPOCHROMIA BLD QL SMEAR: ABNORMAL
IMM GRANULOCYTES # BLD AUTO: 0 K/UL (ref 0–0.04)
IMM GRANULOCYTES NFR BLD: 0 % (ref 0–0.4)
KETONES UR STRIP-SCNC: NEGATIVE MG/DL
LEUKOCYTE ESTERASE UR QL STRIP: NEGATIVE
LYMPHOCYTES # BLD AUTO: 3.7 K/UL (ref 1–4.8)
LYMPHOCYTES NFR BLD AUTO: 60.1 % (ref 27–41)
MCH RBC QN AUTO: 22.7 PG (ref 27–31)
MCHC RBC AUTO-ENTMCNC: 31.6 G/DL (ref 32–36)
MCV RBC AUTO: 71.7 FL (ref 80–96)
MICROCYTES BLD QL SMEAR: ABNORMAL
MONOCYTES # BLD AUTO: 0.51 K/UL (ref 0–0.8)
MONOCYTES NFR BLD AUTO: 8.3 % (ref 2–6)
MPC BLD CALC-MCNC: ABNORMAL G/DL
NEUTROPHILS # BLD AUTO: 1.68 K/UL (ref 1.8–7.7)
NEUTROPHILS NFR BLD AUTO: 27.2 % (ref 53–65)
NITRITE UR QL STRIP: NEGATIVE
NRBC # BLD AUTO: 0 X10E3/UL
NRBC, AUTO (.00): 0 %
OPIATES UR QL SCN: POSITIVE
PCP UR QL SCN: NEGATIVE
PH UR STRIP: 7 PH UNITS
PLATELET # BLD AUTO: 156 K/UL (ref 150–400)
PLATELET MORPHOLOGY: ABNORMAL
POLYCHROMASIA BLD QL SMEAR: ABNORMAL
POTASSIUM SERPL-SCNC: 3.8 MMOL/L (ref 3.5–5.1)
PROT SERPL-MCNC: 6.4 G/DL (ref 6.4–8.2)
PROT UR QL STRIP: NEGATIVE
RBC # BLD AUTO: 4.59 M/UL (ref 4.2–5.4)
RBC # UR STRIP: NEGATIVE /UL
SODIUM SERPL-SCNC: 141 MMOL/L (ref 136–145)
SP GR UR STRIP: 1.02
TARGETS BLD QL SMEAR: ABNORMAL
UROBILINOGEN UR STRIP-ACNC: 2 MG/DL
WBC # BLD AUTO: 6.16 K/UL (ref 4.5–11)

## 2024-09-04 NOTE — DISCHARGE INSTRUCTIONS
Continue your medications as prescribed. Take a laxative, such as magnesium citrate, tomorrow for your constipation. As discussed, the medications that you take can cause constipation, so you may benefit from taking a stool softener daily. Follow-up with your PCP for continued care. Return to the ED for new or worsening symptoms.

## 2024-09-04 NOTE — ED PROVIDER NOTES
"Encounter Date: 9/3/2024       History     Chief Complaint   Patient presents with    Shortness of Breath    left arm tightness     Presented with c/o "tightening" sensation in left forearm that started around 2100 after braiding her daughters hair. States while having the "tightening" sensation her arm felt cold. She reports that she took a Klonipin and a Norco and went to sleep. She reports that she then awakened feeling SOB and noted that her left upper arm felt achy. She denies any chest pain and SOB at this time. She denies pain with movement or pressure. She then reports that she thinks that her arm may be hurting because she is constipated. She notes that she had small BM today and took a supp with little results. She notes that some "old women" told her constipation would make her arm hurt. She denies abd pain, n/v or decreased appetite. 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 She reports that she has been having similar arm pain and also neck pain for the last week or more and was prescribed the Norco for her pain. She reports PMH "diastolic heart failure", anxiety, anemia, problems with her WBCs. She is presently not on any medication except the Klonipin and Norco and reports that she has been trying to take different vitamins and herbs.       Review of patient's allergies indicates:  No Known Allergies  Past Medical History:   Diagnosis Date    Anxiety     Depression      Past Surgical History:   Procedure Laterality Date    BREAST SURGERY      LEFT HEART CATHETERIZATION N/A 3/19/2024    Procedure: Left heart cath;  Surgeon: August Guajardo MD;  Location: [facility] CATH LAB;  Service: Cardiology;  Laterality: N/A;     Family History   Problem Relation Name Age of Onset    Hypertension Mother      Hypertension Father   "     Social History     Tobacco Use    Smoking status: Every Day     Current packs/day: 0.50     Types: Cigarettes     Passive exposure: Current    Smokeless tobacco: Never   Substance Use Topics    Alcohol use: Not Currently     Comment: occasionally    Drug use: Not Currently     Frequency: 2.0 times per week     Types: Marijuana, Hydrocodone     Comment: last smoked 4 days ago     Review of Systems   Constitutional:  Negative for activity change, appetite change and fever.   HENT:  Negative for congestion.    Respiratory:  Positive for shortness of breath (episode upon awakening from sleep that has subsided PTA). Negative for wheezing.    Cardiovascular:  Negative for chest pain and palpitations.   Gastrointestinal:  Positive for constipation. Negative for abdominal pain, diarrhea, nausea and vomiting.   Genitourinary:  Negative for difficulty urinating, dysuria and flank pain.   Musculoskeletal:  Positive for arthralgias (left arm pain and shoulder pain) and myalgias (left arm and shoulder pain).   Skin:  Negative for rash.   Neurological:  Negative for dizziness, weakness, light-headedness and headaches.   Psychiatric/Behavioral:  The patient is nervous/anxious.        Physical Exam     Initial Vitals [09/03/24 2318]   BP Pulse Resp Temp SpO2   119/76 102 18 98.1 °F (36.7 °C) 100 %      MAP       --         Physical Exam    Nursing note and vitals reviewed.  Constitutional: She appears well-developed and well-nourished. No distress.   HENT:   Head: Normocephalic.   Right Ear: External ear normal.   Left Ear: External ear normal.   Nose: Nose normal.   Mouth/Throat: Oropharynx is clear and moist.   Eyes: Conjunctivae and EOM are normal.   Neck: Neck supple. No JVD present.   Normal range of motion.  Cardiovascular:  Normal rate, regular rhythm, normal heart sounds and intact distal pulses.           No murmur heard.  Pulmonary/Chest: Breath sounds normal. She has no wheezes. She has no rhonchi. She has no rales.  She exhibits no tenderness.   Abdominal: Abdomen is soft. Bowel sounds are normal. She exhibits no distension. There is no abdominal tenderness. There is no rebound.   Musculoskeletal:         General: No tenderness or edema. Normal range of motion.        Arms:       Cervical back: Normal range of motion and neck supple.     Lymphadenopathy:     She has no cervical adenopathy.   Neurological: She is alert and oriented to person, place, and time. She has normal strength. GCS score is 15. GCS eye subscore is 4. GCS verbal subscore is 5. GCS motor subscore is 6.   Skin: Skin is warm and dry. Capillary refill takes less than 2 seconds.         Medical Screening Exam   See Full Note    ED Course   Procedures  Labs Reviewed   COMPREHENSIVE METABOLIC PANEL - Abnormal       Result Value    Sodium 141      Potassium 3.8      Chloride 106      CO2 28      Anion Gap 11      Glucose 90      BUN 12      Creatinine 0.64      BUN/Creatinine Ratio 19      Calcium 8.2 (*)     Total Protein 6.4      Albumin 3.6      Globulin 2.8      A/G Ratio 1.3      Bilirubin, Total 0.4      Alk Phos 70      ALT 13      AST 8 (*)     eGFR 116     URINALYSIS, REFLEX TO URINE CULTURE - Abnormal    Color, UA Yellow      Clarity, UA Clear      pH, UA 7.0      Leukocytes, UA Negative      Nitrites, UA Negative      Protein, UA Negative      Glucose, UA Negative      Ketones, UA Negative      Urobilinogen, UA 2.0 (*)     Bilirubin, UA Negative      Blood, UA Negative      Specific [place], UA 1.020     DRUG SCREEN, URINE (BEAKER) - Abnormal    Barbiturates, Urine Negative      Benzodiazepine, Urine Negative      Opiates, Urine Positive (*)     Phencyclidine, Urine Negative      Amphetamine, Urine Negative      Cannabinoid, Urine Negative      Cocaine, Urine Negative      Narrative:     The results of screening tests should be considered presumptive. Confirmatory testing is available upon request.    Cutoff Points:  PCP:         25ng/mL  AMPH:         "500ng/mL  KERI:        200ng/mL  BPIIN:        200ng/mL  THC:         50ng/mL  MARINA:         300ng/mL  OPI:         2000ng/mL   CBC WITH DIFFERENTIAL - Abnormal    WBC 6.16      RBC 4.59      Hemoglobin 10.4 (*)     Hematocrit 32.9 (*)     MCV 71.7 (*)     MCH 22.7 (*)     MCHC 31.6 (*)     RDW 14.7 (*)     Platelet Count 156      MPV        Neutrophils % 27.2 (*)     Lymphocytes % 60.1 (*)     Monocytes % 8.3 (*)     Eosinophils % 4.1 (*)     Basophils % 0.3      Immature Granulocytes % 0.0      nRBC, Auto 0.0      Neutrophils, Abs 1.68 (*)     Lymphocytes, Absolute 3.70      Monocytes, Absolute 0.51      Eosinophils, Absolute 0.25      Basophils, Absolute 0.02      Immature Granulocytes, Absolute 0.00      nRBC, Absolute 0.00      Diff Type Scan Smear     CBC MORPHOLOGY - Abnormal    Platelet Morphology Few Large Platelets (*)     Anisocytosis 1+      Microcytosis 2+      Target Cells Few      Polychromasia 1+      Hypochromic Few     CBC W/ AUTO DIFFERENTIAL    Narrative:     The following orders were created for panel order CBC auto differential.  Procedure                               Abnormality         Status                     ---------                               -----------         ------                     CBC with Differential[[id number]]       Abnormal            Final result                 Please view results for these tests on the individual orders.   ALCOHOL,MEDICAL (ETHANOL)    Ethanol Not Detected            Imaging Results    None          Medications - No data to display  Medical Decision Making  Presented with c/o "tightening" sensation in left forearm that started around 2100 after braiding her daughters hair. States while having the "tightening" sensation her arm felt cold. She reports that she took a Klonipin and a Norco and went to sleep. She reports that she then awakened feeling SOB and noted that her left upper arm felt achy. She denies any chest pain and SOB at this time. She denies " "pain with movement or pressure. She then reports that she thinks that her arm may be hurting because she is constipated. She notes that she had small BM today and took a supp with little results. She notes that some "old women" told her constipation would make her arm hurt. She denies abd pain, n/v or decreased appetite. 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 She reports that she has been having similar arm pain and also neck pain for the last week or more and was prescribed the Norco for her pain. She reports PMH "diastolic heart failure", anxiety, anemia, problems with her WBCs. She is presently not on any medication except the Klonipin and Norco and reports that she has been trying to take different vitamins and herbs.    Amount and/or Complexity of Data Reviewed  External Data Reviewed: labs, radiology, ECG and notes.     Details: EMR reviewed. Pt has multiple visits to multiple emergency depts and providers with various complaints. Her most recent visit (8/29) with cardiology shows " Pt was last seen by Dr. Guajardo 7/29/24 for palpitations and chest pain. ZIO cardiac monitor was placed. ZIO results are normal. She had a LHC 03/2024 that showed mild nonobstructive CAD. Echo 02/2024 showed LVEF 40% and LVDD (Grade undetermined)." Her BP that visit was 93/64 and noted that she would not chanelle GDMT recommendations.  Labs: ordered. Decision-making details documented in ED Course.     Details: WBC 6160 with H&H 10.4 and 32.9, plt [id number], neutrophils 27, Na 141, K+ 3.8, BUN 12, creatinine 0.64. UA shows pos urobilinogen and not evidence of infection. UDS pos opitates.  ECG/medicine tests: ordered. Decision-making details documented in ED Course.     Details: SR with 1st AVB. Rate 96. No changes from previous.    Risk  Risk Details: " Discussed labs and EKG with pt. Instructed to take laxative for her constipation and continue her medications as prescribed. Discussed risks of taking Benzodiazepine and opioids together. Pt is stable /66, HR 87, RR 18, o2 sat 100% on RA.  Instructed on home care, med use, follow-up and return precautions. Discharged home with detailed written instructions provided.                 ED Course as of 09/04/24 0106   Wed Sep 04, 2024   0022 WBC: 6.16 [DS]   0022 Hemoglobin(!): 10.4 [DS]   0022 Hematocrit(!): 32.9 [DS]   0022 Platelet Count: 156 [DS]   0022 Neutrophils Relative(!): 27.2 [DS]   0022 Sodium: 141 [DS]   0022 Potassium: 3.8 [DS]   0022 CO2: 28 [DS]   0022 BUN: 12 [DS]   0022 Creatinine: 0.64 [DS]   0022 ALT: 13 [DS]   0022 AST(!): 8 [DS]   0022 Opiates, Urine(!): Positive [DS]      ED Course User Index  [DS] Marcela Madrid NP                           Clinical Impression:   Final diagnoses:  [R06.02] Shortness of breath  [M79.602] Pain of left upper extremity (Primary)        ED Disposition Condition    Discharge Stable          ED Prescriptions    None       Follow-up Information       Follow up With Specialties Details Why Contact Info    Your Primary Care Provider  Schedule an appointment as soon as possible for a visit                [place], Marcela, NP  09/04/24 0106

## 2024-09-04 NOTE — ED TRIAGE NOTES
Presents to ER with c/o left arm tightening on and off for about 1 week. Woke up tonight with shortness of breath. Took lortab and klonopin about 9 pm.  Denies being anxious. States braided her child's hair today. Does have a history of anxiety and chest wall pain.

## 2024-09-09 ENCOUNTER — OFFICE VISIT (OUTPATIENT)
Dept: FAMILY MEDICINE | Facility: CLINIC | Age: 39
End: 2024-09-09
Payer: MEDICAID

## 2024-09-09 ENCOUNTER — HOSPITAL ENCOUNTER (OUTPATIENT)
Dept: RADIOLOGY | Facility: HOSPITAL | Age: 39
Discharge: HOME | End: 2024-09-09
Attending: FAMILY MEDICINE
Payer: MEDICAID

## 2024-09-09 ENCOUNTER — PATIENT MESSAGE (OUTPATIENT)
Dept: FAMILY MEDICINE | Facility: CLINIC | Age: 39
End: 2024-09-09
Payer: MEDICAID

## 2024-09-09 VITALS
SYSTOLIC BLOOD PRESSURE: 93 MMHG | DIASTOLIC BLOOD PRESSURE: 67 MMHG | HEIGHT: 62 IN | WEIGHT: 123 LBS | BODY MASS INDEX: 22.63 KG/M2 | HEART RATE: 97 BPM

## 2024-09-09 DIAGNOSIS — G62.9 NEUROPATHY: ICD-10-CM

## 2024-09-09 DIAGNOSIS — Z79.899 HIGH RISK MEDICATION USE: ICD-10-CM

## 2024-09-09 DIAGNOSIS — R82.5 POSITIVE URINE DRUG SCREEN: ICD-10-CM

## 2024-09-09 DIAGNOSIS — M54.2 NECK PAIN: Primary | ICD-10-CM

## 2024-09-09 DIAGNOSIS — M54.2 NECK PAIN: ICD-10-CM

## 2024-09-09 PROBLEM — M54.42 ACUTE LEFT-SIDED LOW BACK PAIN WITH LEFT-SIDED SCIATICA: Status: ACTIVE | Noted: 2024-09-09

## 2024-09-09 LAB
CTP QC/QA: YES
POC (AMP) AMPHETAMINE: ABNORMAL
POC (BAR) BARBITURATES: ABNORMAL
POC (BUP) BUPRENORPHINE: ABNORMAL
POC (BZO) BENZODIAZEPINES: ABNORMAL
POC (COC) COCAINE: ABNORMAL
POC (MDMA) METHYLENEDIOXYMETHAMPHETAMINE 3,4: ABNORMAL
POC (MET) METHAMPHETAMINE: ABNORMAL
POC (MOP) OPIATES: ABNORMAL
POC (MTD) METHADONE: NEGATIVE
POC (OXY) OXYCODONE: ABNORMAL
POC (PCP) PHENCYCLIDINE: NEGATIVE
POC (TCA) TRICYCLIC ANTIDEPRESSANTS: NEGATIVE
POC TEMPERATURE (URINE): ABNORMAL
POC THC: NEGATIVE

## 2024-09-09 PROCEDURE — 3074F SYST BP LT 130 MM HG: CPT | Mod: CPTII,,, | Performed by: FAMILY MEDICINE

## 2024-09-09 PROCEDURE — 3008F BODY MASS INDEX DOCD: CPT | Mod: CPTII,,, | Performed by: FAMILY MEDICINE

## 2024-09-09 PROCEDURE — 72040 X-RAY EXAM NECK SPINE 2-3 VW: CPT | Mod: 26,,, | Performed by: RADIOLOGY

## 2024-09-09 PROCEDURE — G0482 DRUG TEST DEF 15-21 CLASSES: HCPCS | Mod: 90,,, | Performed by: CLINICAL MEDICAL LABORATORY

## 2024-09-09 PROCEDURE — 3078F DIAST BP <80 MM HG: CPT | Mod: CPTII,,, | Performed by: FAMILY MEDICINE

## 2024-09-09 PROCEDURE — 80305 DRUG TEST PRSMV DIR OPT OBS: CPT | Mod: RHCUB | Performed by: FAMILY MEDICINE

## 2024-09-09 PROCEDURE — 80307 DRUG TEST PRSMV CHEM ANLYZR: CPT | Mod: 90,,, | Performed by: CLINICAL MEDICAL LABORATORY

## 2024-09-09 PROCEDURE — 99214 OFFICE O/P EST MOD 30 MIN: CPT | Mod: ,,, | Performed by: FAMILY MEDICINE

## 2024-09-09 PROCEDURE — 72040 X-RAY EXAM NECK SPINE 2-3 VW: CPT | Mod: TC

## 2024-09-09 PROCEDURE — 1159F MED LIST DOCD IN RCRD: CPT | Mod: CPTII,,, | Performed by: FAMILY MEDICINE

## 2024-09-09 NOTE — PROGRESS NOTES
"              Ezra Morrison IV, DO  [facility]  [address] [place] [address]  Phone: [phone]      Subjective     Name: Windy Alcocer   Sex: female  YOB: 1985 (39 y.o.)  MRN: [id number]  Visit Date: 09/09/2024   Chief Complaint: Arm Pain        HISTORY OF PRESENT ILLNESS:    Chief Complaint   Patient presents with    Arm Pain       HPI  See tests that keep you healthy and the problem oriented documentation below.    Tests to Keep You Healthy    Cervical Cancer Screening: Met on 8/24/2023  Tobacco Cessation: NO      Portions of this note may have been created with voice recognition software. Occasional "wrong-word" or "sound-a-like" substitutions may have occurred due to the inherent limitations of voice recognition software. Please, read the note carefully and recognize, using context, where substitutions have occurred.     PAST MEDICAL HISTORY:  Significant Diagnoses - Patient  has a past medical history of Anxiety and Depression.  Medications - Patient has a current medication list which includes the following long-term medication(s): clonazepam.   Allergies - Patient has No Known Allergies.  Surgeries - Patient  has a past surgical history that includes Breast surgery and Left heart catheterization (N/A, 3/19/2024).  Family History - Patient family history includes Hypertension in her father and mother.      SOCIAL HISTORY:  Tobacco - Patient  reports that she has been smoking cigarettes. She has been exposed to tobacco smoke. She has never used smokeless tobacco.   Alcohol - Patient  reports that she does not currently use alcohol.   Recreational Drugs - Patient  reports that she does not currently use drugs after having used the following drugs: Marijuana and Hydrocodone. Frequency: 2.00 times per week.       Review of Systems   Constitutional:  Positive for activity change. Negative for unexpected weight change.   HENT:  Negative for hearing loss, rhinorrhea and " "trouble swallowing.    Eyes:  Positive for discharge. Negative for visual disturbance.   Respiratory:  Positive for chest tightness. Negative for wheezing.    Cardiovascular:  Positive for palpitations. Negative for chest pain.   Gastrointestinal:  Negative for blood in stool, constipation, diarrhea and vomiting.   Endocrine: Negative for polydipsia and polyuria.   Genitourinary:  Negative for difficulty urinating, dysuria, hematuria and menstrual problem.   Musculoskeletal:  Positive for arthralgias, joint swelling and neck pain.   Neurological:  Positive for weakness and headaches.   Psychiatric/Behavioral:  Positive for dysphoric mood. Negative for confusion.    All other systems reviewed and are negative.       Past Medical History:   Diagnosis Date    Anxiety     Depression         Review of patient's allergies indicates:  No Known Allergies     Past Surgical History:   Procedure Laterality Date    BREAST SURGERY      LEFT HEART CATHETERIZATION N/A 3/19/2024    Procedure: Left heart cath;  Surgeon: August Guajardo MD;  Location: [facility] CATH LAB;  Service: Cardiology;  Laterality: N/A;        Family History   Problem Relation Name Age of Onset    Hypertension Mother      Hypertension Father         Current Outpatient Medications   Medication Instructions    clonazePAM (KLONOPIN) 1 MG tablet Take 1 tablet (1 mg total) by mouth once daily AND 1.5 tablets (1.5 mg total) every evening.    HYDROcodone-acetaminophen (NORCO)  mg per tablet 1 tablet, Oral        Objective     BP 93/67   Pulse 97   Ht 5' 2" (1.575 m)   Wt 55.8 kg (123 lb)   LMP 08/19/2024 Comment: just got off depo  BMI 22.50 kg/m²     Physical Exam  Constitutional:       General: She is not in acute distress.     Appearance: Normal appearance. She is not ill-appearing.   HENT:      Head: Normocephalic and atraumatic.      Right Ear: External ear normal.      Left Ear: External ear normal.   Eyes:      Extraocular Movements: " Extraocular movements intact.      Conjunctiva/sclera: Conjunctivae normal.   Cardiovascular:      Rate and Rhythm: Normal rate.      Heart sounds: No murmur heard.  Pulmonary:      Effort: Pulmonary effort is normal.   Musculoskeletal:      Cervical back: Normal range of motion.   Skin:     General: Skin is warm and dry.      Coloration: Skin is not jaundiced.      Findings: No rash.   Neurological:      Mental Status: She is alert.   Psychiatric:         Mood and Affect: Mood normal.        All recently obtained labs have been reviewed and discussed in detail with the patient.   Assessment     1. Neck pain    2. Neuropathy    3. High risk medication use         Plan        Problem List Items Addressed This Visit       Neck pain - Primary (Chronic)    Relevant Orders    X-Ray Cervical Spine 2 or 3 Views    Ambulatory referral/consult to Physical/Occupational Therapy    Comprehensive Metabolic Panel    Neuropathy (Chronic)    Relevant Orders    Ambulatory referral/consult to Physical/Occupational Therapy    Comprehensive Metabolic Panel     Other Visit Diagnoses       High risk medication use        Relevant Orders    POCT Urine Drug Screen Presump    Comprehensive Metabolic Panel            No follow-ups on file.    Patient advised that is symptoms worsen, they should call or report directly to local emergency department.    Ezra Morrison,   [facility]

## 2024-09-10 ENCOUNTER — OFFICE VISIT (OUTPATIENT)
Dept: FAMILY MEDICINE | Facility: CLINIC | Age: 39
End: 2024-09-10
Payer: MEDICAID

## 2024-09-10 VITALS
HEIGHT: 62 IN | DIASTOLIC BLOOD PRESSURE: 60 MMHG | WEIGHT: 123 LBS | SYSTOLIC BLOOD PRESSURE: 95 MMHG | HEART RATE: 101 BPM | BODY MASS INDEX: 22.63 KG/M2

## 2024-09-10 DIAGNOSIS — M79.602 LEFT ARM PAIN: ICD-10-CM

## 2024-09-10 DIAGNOSIS — G62.9 NEUROPATHY: Primary | ICD-10-CM

## 2024-09-10 PROCEDURE — 99214 OFFICE O/P EST MOD 30 MIN: CPT | Mod: ,,, | Performed by: FAMILY MEDICINE

## 2024-09-10 PROCEDURE — 3008F BODY MASS INDEX DOCD: CPT | Mod: CPTII,,, | Performed by: FAMILY MEDICINE

## 2024-09-10 PROCEDURE — 1160F RVW MEDS BY RX/DR IN RCRD: CPT | Mod: CPTII,,, | Performed by: FAMILY MEDICINE

## 2024-09-10 PROCEDURE — 1159F MED LIST DOCD IN RCRD: CPT | Mod: CPTII,,, | Performed by: FAMILY MEDICINE

## 2024-09-10 PROCEDURE — 3078F DIAST BP <80 MM HG: CPT | Mod: CPTII,,, | Performed by: FAMILY MEDICINE

## 2024-09-10 PROCEDURE — 3074F SYST BP LT 130 MM HG: CPT | Mod: CPTII,,, | Performed by: FAMILY MEDICINE

## 2024-09-10 NOTE — ASSESSMENT & PLAN NOTE
- monitor symptoms like numbness, weakness, altered sensation  - patient had an X ray done recently that showed height reduction at c7-T1 level  - Ambulatory referral to PT/OT was made  - the X ray report was discussed in details with the patient and benefits of PT were explained  - follow up if symptoms worsen or fail to improve

## 2024-09-10 NOTE — PROGRESS NOTES
Ngozi Connor MD MPH  [address], [place] [address]  Phone: [phone]     Subjective     Name: Windy Alcocer   YOB: 1985 (39 y.o.)  MRN: [id number]  Visit Date: 9/10/2024   Chief Complaint: Arm Pain (Pt c/o left arm pain and swelling )        HISTORY OF PRESENT ILLNESS:  Patient is a 40 yo female with PMH of anxiety, neuropathic pain, low back pain with sciatica, and anemia. She presented to the clinic with complaints of left sided forearm pain that started few months ago. The pain is achy in nature and is 6/10 on pain scale. It is episodic and is worse with repetitive movements. She denies any weakness but she does have tingling sensation after she wakes up or she's is sitting in the same position for a while.  Xray report was discussed with her details.        PAST MEDICAL HISTORY:  Significant Diagnoses - Patient  has a past medical history of Anxiety and Depression.  Medications - Patient has a current medication list which includes the following long-term medication(s): clonazepam.   Allergies - Patient has No Known Allergies.  Surgeries - Patient  has a past surgical history that includes Breast surgery and Left heart catheterization (N/A, 3/19/2024).  Family History - Patient family history includes Hypertension in her father and mother.      SOCIAL HISTORY:  Tobacco - Patient  reports that she has been smoking cigarettes. She has been exposed to tobacco smoke. She has never used smokeless tobacco.   Alcohol - Patient  reports that she does not currently use alcohol.   Recreational Drugs - Patient  reports that she does not currently use drugs after having used the following drugs: Marijuana and Hydrocodone. Frequency: 2.00 times per week.       Review of Systems   Constitutional:  Negative for activity change, appetite change, chills, fatigue and fever.   HENT:  Negative for nasal congestion, ear discharge, ear pain, hearing loss, postnasal drip, rhinorrhea, sinus  "pressure/congestion and sore throat.    Eyes:  Negative for discharge, itching and visual disturbance.   Respiratory:  Negative for cough, choking, chest tightness, shortness of breath and wheezing.    Cardiovascular:  Negative for chest pain, palpitations, leg swelling and claudication.   Gastrointestinal:  Negative for abdominal distention, abdominal pain, constipation, diarrhea, nausea, vomiting and reflux.   Genitourinary:  Negative for difficulty urinating, dysuria, frequency, hematuria and urgency.   Musculoskeletal:  Positive for back pain. Negative for arthralgias, joint swelling and myalgias.        Left forearm pain alongside with shoulder and neck pain   Integumentary:  Negative for rash.   Neurological:  Negative for tremors, light-headedness, numbness and headaches.          Past Medical History:   Diagnosis Date    Anxiety     Depression         Review of patient's allergies indicates:  No Known Allergies     Past Surgical History:   Procedure Laterality Date    BREAST SURGERY      LEFT HEART CATHETERIZATION N/A 3/19/2024    Procedure: Left heart cath;  Surgeon: August Guajardo MD;  Location: [facility] CATH LAB;  Service: Cardiology;  Laterality: N/A;        Family History   Problem Relation Name Age of Onset    Hypertension Mother      Hypertension Father         Current Outpatient Medications   Medication Instructions    clonazePAM (KLONOPIN) 1 MG tablet Take 1 tablet (1 mg total) by mouth once daily AND 1.5 tablets (1.5 mg total) every evening.    HYDROcodone-acetaminophen (NORCO)  mg per tablet 1 tablet        Objective     BP 95/60   Pulse 101   Ht 5' 2" (1.575 m)   Wt 55.8 kg (123 lb)   LMP 08/19/2024 Comment: just got off depo  BMI 22.50 kg/m²     Physical Exam  Vitals and nursing note reviewed.   Constitutional:       General: She is not in acute distress.     Appearance: Normal appearance. She is not ill-appearing.   HENT:      Head: Normocephalic and atraumatic.      Right Ear: " External ear normal.      Left Ear: External ear normal.      Nose: Nose normal. No congestion or rhinorrhea.      Mouth/Throat:      Mouth: Mucous membranes are moist.      Pharynx: Oropharynx is clear. No oropharyngeal exudate or posterior oropharyngeal erythema.   Eyes:      Extraocular Movements: Extraocular movements intact.      Conjunctiva/sclera: Conjunctivae normal.      Pupils: Pupils are equal, round, and reactive to light.   Cardiovascular:      Rate and Rhythm: Normal rate and regular rhythm.      Pulses: Normal pulses.      Heart sounds: Normal heart sounds. No murmur heard.  Pulmonary:      Effort: Pulmonary effort is normal. No respiratory distress.      Breath sounds: Normal breath sounds. No wheezing or rales.   Chest:      Chest wall: No tenderness.   Abdominal:      General: Bowel sounds are normal. There is no distension.      Palpations: Abdomen is soft.      Tenderness: There is no abdominal tenderness.   Musculoskeletal:         General: No swelling or tenderness.      Cervical back: Normal range of motion and neck supple.      Right lower leg: No edema.      Left lower leg: No edema.   Skin:     Capillary Refill: Capillary refill takes less than 2 seconds.      Findings: No bruising, erythema or rash.   Neurological:      General: No focal deficit present.      Mental Status: She is alert.      Sensory: No sensory deficit.   Psychiatric:         Mood and Affect: Mood normal.         Behavior: Behavior normal.         Thought Content: Thought content normal.          All recently obtained labs have been reviewed and discussed in detail with the patient.   Assessment     1. Neuropathy    2. Left arm pain         Plan     Problem List Items Addressed This Visit          Neuro    Neuropathy - Primary (Chronic)     - monitor symptoms like numbness, weakness, altered sensation  - patient had an X ray done recently that showed height reduction at c7-T1 level  - Ambulatory referral to PT/OT was  made  - the X ray report was discussed in details with the patient and benefits of PT were explained  - follow up if symptoms worsen or fail to improve              Orthopedic    Left arm pain     - monitor symptoms like numbness, weakness, altered sensation  - patient had an X ray done recently that showed height reduction at c7-T1 level  - Ambulatory referral to PT/OT was made  - the X ray report was discussed in details with the patient and benefits of PT were explained  - follow up if symptoms worsen or fail to improve                  All orders:          Follow up if symptoms worsen or fail to improve.    Ngozi Connor MD MPH   [email or web address]

## 2024-09-13 LAB — MAYO GENERIC ORDERABLE RESULT: ABNORMAL

## 2024-09-18 ENCOUNTER — PATIENT MESSAGE (OUTPATIENT)
Dept: FAMILY MEDICINE | Facility: CLINIC | Age: 39
End: 2024-09-18
Payer: MEDICAID

## 2024-09-18 ENCOUNTER — OFFICE VISIT (OUTPATIENT)
Dept: FAMILY MEDICINE | Facility: CLINIC | Age: 39
End: 2024-09-18
Payer: MEDICAID

## 2024-09-18 VITALS
HEART RATE: 84 BPM | SYSTOLIC BLOOD PRESSURE: 116 MMHG | HEIGHT: 62 IN | WEIGHT: 122 LBS | BODY MASS INDEX: 22.45 KG/M2 | DIASTOLIC BLOOD PRESSURE: 75 MMHG

## 2024-09-18 DIAGNOSIS — M79.632 PAIN OF LEFT FOREARM: Primary | ICD-10-CM

## 2024-09-18 DIAGNOSIS — G62.9 NEUROPATHY: ICD-10-CM

## 2024-09-18 NOTE — ASSESSMENT & PLAN NOTE
monitor symptoms like numbness, weakness, altered sensation  - patient had an X ray done recently that showed height reduction at c7-T1 level  - waiting on appointment with PT/OT   - the X ray report was discussed in details with the patient and benefits of PT were explained  - follow up if symptoms worsen or fail to improve

## 2024-09-18 NOTE — PROGRESS NOTES
Ngozi Connor MD MPH  [address], [place] [address]  Phone: [phone]     Subjective     Name: Windy Alcocer   YOB: 1985 (39 y.o.)  MRN: [id number]  Visit Date: 9/18/2024   Chief Complaint: Arm Pain (Pt c/o left arm pain and swelling)        HISTORY OF PRESENT ILLNESS:  Patient is a 40 yo female with PMH of anxiety, neuropathic pain, low back pain with sciatica, and anemia. She presented to the clinic with complaints of persistent left sided forearm pain that started few months ago. The pain is achy in nature and is 6/10 on pain scale. It is episodic and is worse with repetitive movements. She denies any weakness but she does have tingling sensation after she wakes up or she's is sitting in the same position for a while. She has noticed that her forearm appears swollen in the morning. During the exam, no swelling, redness, or warmth was noticed.  Xray report was discussed with her details.          PAST MEDICAL HISTORY:  Significant Diagnoses - Patient  has a past medical history of Anxiety and Depression.  Medications - Patient has a current medication list which includes the following long-term medication(s): clonazepam.   Allergies - Patient has No Known Allergies.  Surgeries - Patient  has a past surgical history that includes Breast surgery and Left heart catheterization (N/A, 3/19/2024).  Family History - Patient family history includes Hypertension in her father and mother.      SOCIAL HISTORY:  Tobacco - Patient  reports that she has been smoking cigarettes. She has been exposed to tobacco smoke. She has never used smokeless tobacco.   Alcohol - Patient  reports that she does not currently use alcohol.   Recreational Drugs - Patient  reports that she does not currently use drugs after having used the following drugs: Marijuana and Hydrocodone. Frequency: 2.00 times per week.       Review of Systems   Constitutional:  Negative for activity change, appetite change, chills, fatigue  "and fever.   HENT:  Negative for nasal congestion, ear discharge, ear pain, hearing loss, postnasal drip, rhinorrhea, sinus pressure/congestion and sore throat.    Eyes:  Negative for discharge, itching and visual disturbance.   Respiratory:  Negative for cough, choking, chest tightness, shortness of breath and wheezing.    Cardiovascular:  Negative for chest pain, palpitations, leg swelling and claudication.   Gastrointestinal:  Negative for abdominal distention, abdominal pain, constipation, diarrhea, nausea, vomiting and reflux.   Genitourinary:  Negative for difficulty urinating, dysuria, frequency, hematuria and urgency.   Musculoskeletal:  Negative for arthralgias, back pain, joint swelling and myalgias.        Left forearm pain alongside with shoulder pain   Integumentary:  Negative for rash.   Neurological:  Negative for tremors, light-headedness, numbness and headaches.          Past Medical History:   Diagnosis Date    Anxiety     Depression         Review of patient's allergies indicates:  No Known Allergies     Past Surgical History:   Procedure Laterality Date    BREAST SURGERY      LEFT HEART CATHETERIZATION N/A 3/19/2024    Procedure: Left heart cath;  Surgeon: August Guajardo MD;  Location: [facility] CATH LAB;  Service: Cardiology;  Laterality: N/A;        Family History   Problem Relation Name Age of Onset    Hypertension Mother      Hypertension Father         Current Outpatient Medications   Medication Instructions    clonazePAM (KLONOPIN) 1 MG tablet Take 1 tablet (1 mg total) by mouth once daily AND 1.5 tablets (1.5 mg total) every evening.        Objective     /75   Pulse 84   Ht 5' 2" (1.575 m)   Wt 55.3 kg (122 lb)   LMP 08/19/2024 Comment: just got off depo  BMI 22.31 kg/m²     Physical Exam  Vitals and nursing note reviewed.   Constitutional:       General: She is not in acute distress.     Appearance: Normal appearance. She is not ill-appearing.   HENT:      Head: " Normocephalic and atraumatic.      Right Ear: External ear normal.      Left Ear: External ear normal.      Nose: Nose normal. No congestion or rhinorrhea.      Mouth/Throat:      Mouth: Mucous membranes are moist.      Pharynx: Oropharynx is clear. No oropharyngeal exudate or posterior oropharyngeal erythema.   Eyes:      Extraocular Movements: Extraocular movements intact.      Conjunctiva/sclera: Conjunctivae normal.      Pupils: Pupils are equal, round, and reactive to light.   Cardiovascular:      Rate and Rhythm: Normal rate and regular rhythm.      Pulses: Normal pulses.      Heart sounds: Normal heart sounds. No murmur heard.  Pulmonary:      Effort: Pulmonary effort is normal. No respiratory distress.      Breath sounds: Normal breath sounds. No wheezing or rales.   Chest:      Chest wall: No tenderness.   Abdominal:      General: Bowel sounds are normal. There is no distension.      Palpations: Abdomen is soft.      Tenderness: There is no abdominal tenderness.   Musculoskeletal:         General: No swelling or tenderness.      Cervical back: Normal range of motion and neck supple.      Right lower leg: No edema.      Left lower leg: No edema.   Skin:     Capillary Refill: Capillary refill takes less than 2 seconds.      Findings: No bruising, erythema or rash.   Neurological:      General: No focal deficit present.      Mental Status: She is alert.      Sensory: No sensory deficit.   Psychiatric:         Mood and Affect: Mood normal.         Behavior: Behavior normal.         Thought Content: Thought content normal.          All recently obtained labs have been reviewed and discussed in detail with the patient.   Assessment     1. Pain of left forearm    2. Neuropathy         Plan     Problem List Items Addressed This Visit          Neuro    Neuropathy (Chronic)     monitor symptoms like numbness, weakness, altered sensation  - patient had an X ray done recently that showed height reduction at c7-T1  level  - waiting on appointment with PT/OT   - the X ray report was discussed in details with the patient and benefits of PT were explained  - follow up if symptoms worsen or fail to improve            Orthopedic    Pain of left forearm - Primary (Chronic)     monitor symptoms like numbness, weakness, altered sensation  - patient had an X ray done recently that showed height reduction at c7-T1 level  - waiting on appointment with PT/OT   - the X ray report was discussed in details with the patient and benefits of PT were explained  - follow up if symptoms worsen or fail to improve                All orders:          Follow up if symptoms worsen or fail to improve.    Ngozi Connor MD MPH   [employer]

## 2024-09-20 DIAGNOSIS — M79.632 PAIN OF LEFT FOREARM: Primary | ICD-10-CM

## 2024-09-24 ENCOUNTER — CLINICAL SUPPORT (OUTPATIENT)
Dept: REHABILITATION | Facility: HOSPITAL | Age: 39
End: 2024-09-24
Attending: FAMILY MEDICINE
Payer: MEDICAID

## 2024-09-24 DIAGNOSIS — M54.2 NECK PAIN: ICD-10-CM

## 2024-09-24 DIAGNOSIS — G62.9 NEUROPATHY: ICD-10-CM

## 2024-09-24 PROCEDURE — 97162 PT EVAL MOD COMPLEX 30 MIN: CPT
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
## 2024-09-24 NOTE — PATIENT INSTRUCTIONS
CHIN TUCK - SUPINE    While lying on your back, tuck your chin towards your chest and press the back of your head into the table.    Maintain contact of the back of your head with the surface you are lying on the entire time.    Video # [id number] Repeat 10 Times   Hold 1 Second   Complete 2 Sets   Perform 1 Times a Day          UPPER TRAP STRETCH        - Stand upright with good posture, one arm behind the back  - Use your free hand to grab the top of your head  - Gently pull your head to the side (ear to shoulder)  - Hold the stretch and try to relax your muscles with deep breathing     Repeat 3 Times   Hold 20 Seconds          WAND FLEXION 90 - SUPINE    Start by lying on your back while holding a wand or cane, across your lap. slowly raise the wand towards overhead. Use your unaffected arm to assist with the movement. Raise to 90 degrees (ie. until arms are straight up). Stop, then slowly return to starting position.    Video # [id number] Repeat 10 Times   Hold 10 Seconds   Complete 1 Set   Perform 1 Times a Day          PECTORALIS CORNER STRETCH    While standing at a corner of a wall, place your arms on the walls with elbows bent so that your upper arms are horizontal and your forearms are directed upwards as shown. Take one step forward towards the corner. Bend your front knee until a stretch is felt along the front of your chest and/or shoulders. Your arms should be pointed downward towards the ground.    NOTE: Your legs should control the stretch by bending or straightening your front knee.    Video # [id number] Repeat 3 Times   Hold 30 Seconds   Complete 1 Set   Perform 1 Times a Day          WRIST FLEXOR STRETCH    Use your unaffected hand to bend the affected wrist up as shown.    Keep the elbow straight on the affected side the entire time.    Video # [id number] Repeat 5 Times   Hold 10 Seconds   Complete 1 Set   Perform 1 Times a Day

## 2024-09-26 ENCOUNTER — OFFICE VISIT (OUTPATIENT)
Dept: CARDIOLOGY | Facility: CLINIC | Age: 39
End: 2024-09-26
Payer: MEDICAID

## 2024-09-26 ENCOUNTER — HOSPITAL ENCOUNTER (OUTPATIENT)
Dept: RADIOLOGY | Facility: HOSPITAL | Age: 39
Discharge: HOME | End: 2024-09-26
Attending: NURSE PRACTITIONER
Payer: MEDICAID

## 2024-09-26 VITALS
DIASTOLIC BLOOD PRESSURE: 68 MMHG | WEIGHT: 121 LBS | BODY MASS INDEX: 22.84 KG/M2 | HEIGHT: 61 IN | SYSTOLIC BLOOD PRESSURE: 112 MMHG | OXYGEN SATURATION: 99 % | HEART RATE: 87 BPM

## 2024-09-26 DIAGNOSIS — M79.602 LEFT ARM PAIN: Primary | ICD-10-CM

## 2024-09-26 DIAGNOSIS — M79.602 LEFT ARM PAIN: ICD-10-CM

## 2024-09-26 DIAGNOSIS — M79.89 LEFT ARM SWELLING: ICD-10-CM

## 2024-09-26 DIAGNOSIS — I50.32 CHRONIC DIASTOLIC HEART FAILURE: ICD-10-CM

## 2024-09-26 DIAGNOSIS — I25.10 NONOCCLUSIVE CORONARY ATHEROSCLEROSIS OF NATIVE CORONARY ARTERY: ICD-10-CM

## 2024-09-26 PROCEDURE — 73060 X-RAY EXAM OF HUMERUS: CPT | Mod: TC,LT

## 2024-09-26 PROCEDURE — 1159F MED LIST DOCD IN RCRD: CPT | Mod: CPTII,,, | Performed by: NURSE PRACTITIONER

## 2024-09-26 PROCEDURE — 73090 X-RAY EXAM OF FOREARM: CPT | Mod: TC,LT

## 2024-09-26 PROCEDURE — 76882 US LMTD JT/FCL EVL NVASC XTR: CPT | Mod: 26,LT,, | Performed by: RADIOLOGY

## 2024-09-26 PROCEDURE — 99214 OFFICE O/P EST MOD 30 MIN: CPT | Mod: S$PBB,,, | Performed by: NURSE PRACTITIONER

## 2024-09-26 PROCEDURE — 73090 X-RAY EXAM OF FOREARM: CPT | Mod: 26,LT,, | Performed by: RADIOLOGY

## 2024-09-26 PROCEDURE — 73060 X-RAY EXAM OF HUMERUS: CPT | Mod: 26,LT,, | Performed by: RADIOLOGY

## 2024-09-26 PROCEDURE — 99999 PR PBB SHADOW E&M-EST. PATIENT-LVL III: CPT | Mod: PBBFAC,,, | Performed by: NURSE PRACTITIONER

## 2024-09-26 PROCEDURE — 99213 OFFICE O/P EST LOW 20 MIN: CPT | Mod: PBBFAC | Performed by: NURSE PRACTITIONER

## 2024-09-26 PROCEDURE — 76882 US LMTD JT/FCL EVL NVASC XTR: CPT | Mod: TC,LT

## 2024-09-26 NOTE — PROGRESS NOTES
PCP: Ezra Morrison IV, DO    Referring Provider:     Subjective:   Windy Alcocer is a 39 y.o. female, current smoker, with hx of anxiety and depression who presents for follow up.    9/26/24: Pt presents with c/o left arm pain and concerned that it is due to her heart. She had an episode that woke her from sleep, she reports she was coughing, had chest pain along with back pain and left arm pain. Reassurance given ([facility] 03/2024 showed very mild nonobstructive CAD). On exam, her left inner forearm/wrist area is swollen. She states she first noticed the swelling approximately one month ago. She is concerned that it may be a blood clot. She states her whole left arm is hurting and hurts more with movement. Pt feels like no one is listening to her complaints or doing anything about her symptoms.     08/29/24: Pt was last seen by Dr. Guajardo 7/29/24 for palpitations and chest pain. ZIO cardiac monitor was placed. ZIO results are normal. She had a LHC 03/2024 that showed mild nonobstructive CAD. Echo 02/2024 showed LVEF 40% and LVDD (Grade undetermined). Repeat Echo 05/2024 showed normal LVEF with Grade I LVDD.     Today, pt is doing well. Denies chest pain, SOB or any other cardiac complaints today. She states she has quit marijuana and is trying to quit smoking.         Fhx: No premature CAD  Shx: current smoker, occasional etoh use, states she has quit marijuana    EKG   Results for orders placed or performed during the hospital encounter of 09/03/24   EKG 12-lead    Collection Time: 09/03/24 11:30 PM   Result Value Ref Range    QRS Duration 78 ms    OHS QTC Calculation 434 ms    Narrative    Test Reason : R06.02,    Vent. Rate : 096 BPM     Atrial Rate : 096 BPM     P-R Int : 214 ms          QRS Dur : 078 ms      QT Int : 344 ms       P-R-T Axes : 074 -18 069 degrees     QTc Int : 434 ms    Sinus rhythm with 1st degree A-V block  Otherwise normal ECG  When compared with ECG of 05-JUL-2024 17:18,  No significant change  was found  Confirmed by Reinaldo Little MD ([id number]) on 9/16/2024 12:14:11 AM    Referred By: [email or web address]   SELF           Confirmed By:Reinaldo Little MD     ECHO Results for orders placed during the hospital encounter of 05/28/24    Echo    Interpretation Summary    Left Ventricle: The left ventricle is normal in size. Normal wall thickness. There is normal systolic function. Grade I diastolic dysfunction.    Right Ventricle: Normal right ventricular cavity size. Systolic function is borderline low.    Aortic Valve: The aortic valve is a trileaflet valve.    Pulmonic Valve: There is mild regurgitation.    Pulmonary Artery: The estimated pulmonary artery systolic pressure is 15 mmHg.    IVC/SVC: Normal venous pressure at 3 mmHg.    C Results for orders placed during the hospital encounter of 03/19/24    Cardiac catheterization    Conclusion    The pre-procedure left ventricular end diastolic pressure was 10.    The estimated blood loss was none.    There was non-obstructive coronary artery disease..    Non-obstructive cors  Co-dominant circulation  Minor luminal irregularities only  R radial access, no complications    The procedure log was documented by Documenter: Nataly Sesay and verified by August Guajardo MD.    Date: 3/22/2024  Time: 9:13 PM        Lab Results   Component Value Date     (L) 09/09/2024    K 4.1 09/09/2024     09/09/2024    CO2 29 09/09/2024    BUN 11 09/09/2024    CREATININE 0.66 09/09/2024    CALCIUM 8.2 (L) 09/09/2024    ANIONGAP 7 09/09/2024       Lab Results   Component Value Date    CHOL 165 03/26/2024     Lab Results   Component Value Date    HDL 50 03/26/2024     Lab Results   Component Value Date    LDLCALC 101 03/26/2024     Lab Results   Component Value Date    TRIG 68 03/26/2024     Lab Results   Component Value Date    CHOLHDL 3.3 03/26/2024       Lab Results   Component Value Date    WBC 6.16 09/03/2024    HGB 10.4 (L) 09/03/2024    HCT 32.9 (L) 09/03/2024    MCV  71.7 (L) 09/03/2024     09/03/2024           Current Outpatient Medications:     clonazePAM (KLONOPIN) 1 MG tablet, Take 1 tablet (1 mg total) by mouth once daily AND 1.5 tablets (1.5 mg total) every evening., Disp: 75 tablet, Rfl: 1    Review of Systems   Constitutional:  Negative for chills, diaphoresis, fever and malaise/fatigue.   Respiratory:  Negative for cough and shortness of breath.    Cardiovascular:  Negative for chest pain, palpitations, orthopnea, leg swelling and PND.   Gastrointestinal:  Negative for abdominal pain, nausea and vomiting.   Musculoskeletal:  Negative for falls.   Neurological:  Negative for focal weakness and weakness.         Objective:   LMP 08/19/2024 Comment: just got off depo    Physical Exam  Constitutional:       General: She is not in acute distress.     Appearance: Normal appearance.   Cardiovascular:      Rate and Rhythm: Normal rate and regular rhythm.   Pulmonary:      Effort: Pulmonary effort is normal.      Breath sounds: Normal breath sounds.   Musculoskeletal:      Cervical back: Neck supple. No rigidity.      Right lower leg: No edema.      Left lower leg: No edema.   Skin:     General: Skin is warm and dry.   Neurological:      Mental Status: She is alert.           Assessment:     1. Left arm pain  X-Ray Forearm Left    X-Ray Humerus 2 View Left    US Upper Extremity Veins Left      2. Left arm swelling  X-Ray Forearm Left    X-Ray Humerus 2 View Left    US Upper Extremity Veins Left      3. Nonocclusive coronary atherosclerosis of native coronary artery        4. Chronic diastolic heart failure              Plan:   Left arm pain  - c/o left arm pain, worse with movement  - xray of left arm ordered    Left arm swelling  - pain and swelling to left inner forearm/wrist area  - xray and US of left arm    Nonocclusive coronary atherosclerosis of native coronary artery  - [facility] 03/2024 showed very mild nonobstructive CAD    Chronic diastolic heart failure  - echo  05/2024 showed normal LVEF with Grade I LVDD  - euvolemic/asymptomatic  - BP will not tolerate BB      Follow up with Dr. Guajardo as scheduled in January

## 2024-09-26 NOTE — ASSESSMENT & PLAN NOTE
- echo 05/2024 showed normal LVEF with Grade I LVDD  - euvolemic/asymptomatic  - BP will not tolerate BB

## 2024-09-26 NOTE — PATIENT INSTRUCTIONS
Xray of left arm today  US of left arm - will schedule  Follow up with Dr. Guajardo in January as scheduled

## 2024-09-30 ENCOUNTER — TELEPHONE (OUTPATIENT)
Dept: CARDIOLOGY | Facility: CLINIC | Age: 39
End: 2024-09-30
Payer: MEDICAID

## 2024-09-30 NOTE — TELEPHONE ENCOUNTER
Called patient with results of xray and ultrasound, patient said ok.    ----- Message from HARDY Jefferson sent at 9/26/2024  3:02 PM CDT -----  Please call the pt and let her know that her xrays and US of her arm are all normal. Thank you!! :)

## 2024-10-16 ENCOUNTER — OFFICE VISIT (OUTPATIENT)
Dept: FAMILY MEDICINE | Facility: CLINIC | Age: 39
End: 2024-10-16
Payer: MEDICAID

## 2024-10-16 VITALS
WEIGHT: 123 LBS | BODY MASS INDEX: 23.22 KG/M2 | HEART RATE: 109 BPM | DIASTOLIC BLOOD PRESSURE: 60 MMHG | SYSTOLIC BLOOD PRESSURE: 92 MMHG | HEIGHT: 61 IN

## 2024-10-16 DIAGNOSIS — F41.9 ANXIETY: Chronic | ICD-10-CM

## 2024-10-16 RX ORDER — CLONAZEPAM 1 MG/1
TABLET ORAL
Qty: 75 TABLET | Refills: 1 | Status: CANCELLED | OUTPATIENT
Start: 2024-10-16 | End: 2024-12-15

## 2024-10-16 NOTE — PROGRESS NOTES
"              Ezra Morrison IV, DO  [facility]  [address] [place] [address]  Phone: [phone]      Subjective     Name: Windy Alcocer   Sex: female  YOB: 1985 (39 y.o.)  MRN: [id number]  Visit Date: 10/16/2024   Chief Complaint: Medication Refill        HISTORY OF PRESENT ILLNESS:    Chief Complaint   Patient presents with    Medication Refill       HPI  See tests that keep you healthy and the problem oriented documentation below.    Tests to Keep You Healthy    Cervical Cancer Screening: Met on 9/17/2024  Tobacco Cessation: NO      Portions of this note may have been created with voice recognition software. Occasional "wrong-word" or "sound-a-like" substitutions may have occurred due to the inherent limitations of voice recognition software. Please, read the note carefully and recognize, using context, where substitutions have occurred.     PAST MEDICAL HISTORY:  Significant Diagnoses - Patient  has a past medical history of Anxiety and Depression.  Medications - Patient has a current medication list which includes the following long-term medication(s): atorvastatin.   Allergies - Patient has No Known Allergies.  Surgeries - Patient  has a past surgical history that includes Breast surgery and Left heart catheterization (N/A, 3/19/2024).  Family History - Patient family history includes Hypertension in her father and mother.      SOCIAL HISTORY:  Tobacco - Patient  reports that she has been smoking cigarettes. She has been exposed to tobacco smoke. She has never used smokeless tobacco.   Alcohol - Patient  reports that she does not currently use alcohol.   Recreational Drugs - Patient  reports that she does not currently use drugs after having used the following drugs: Marijuana and Hydrocodone. Frequency: 2.00 times per week.       Review of Systems   All other systems reviewed and are negative.       Past Medical History:   Diagnosis Date    Anxiety     Depression  " "       Review of patient's allergies indicates:  No Known Allergies     Past Surgical History:   Procedure Laterality Date    BREAST SURGERY      LEFT HEART CATHETERIZATION N/A 3/19/2024    Procedure: Left heart cath;  Surgeon: August Guajardo MD;  Location: [facility] CATH LAB;  Service: Cardiology;  Laterality: N/A;        Family History   Problem Relation Name Age of Onset    Hypertension Mother      Hypertension Father         Current Outpatient Medications   Medication Instructions    atorvastatin (LIPITOR) 20 mg, Oral, Daily        Objective     BP 92/60   Pulse 109   Ht 5' 1" (1.549 m)   Wt 55.8 kg (123 lb)   BMI 23.24 kg/m²     Physical Exam  Constitutional:       General: She is not in acute distress.     Appearance: Normal appearance. She is not ill-appearing.   HENT:      Head: Normocephalic and atraumatic.      Right Ear: External ear normal.      Left Ear: External ear normal.   Eyes:      Extraocular Movements: Extraocular movements intact.      Conjunctiva/sclera: Conjunctivae normal.   Cardiovascular:      Rate and Rhythm: Normal rate.      Heart sounds: No murmur heard.  Pulmonary:      Effort: Pulmonary effort is normal.   Musculoskeletal:      Cervical back: Normal range of motion.   Skin:     General: Skin is warm and dry.      Coloration: Skin is not jaundiced.      Findings: No rash.   Neurological:      Mental Status: She is alert.   Psychiatric:         Mood and Affect: Mood normal.        All recently obtained labs have been reviewed and discussed in detail with the patient.   Assessment     1. Anxiety         Plan        Problem List Items Addressed This Visit       Anxiety (Chronic)     Did have a long talk with the patient today about results of most recent urine drug screen as well as definitive drug screen and numerous metabolites found inside.  Unfortunately, we will no longer be able to provide controlled substances for this patient secondary to abnormal urine drug " screen.            No follow-ups on file.    Patient advised that is symptoms worsen, they should call or report directly to local emergency department.    Ezra Morrison DO  [facility]

## 2024-10-16 NOTE — ASSESSMENT & PLAN NOTE
Did have a long talk with the patient today about results of most recent urine drug screen as well as definitive drug screen and numerous metabolites found inside.  Unfortunately, we will no longer be able to provide controlled substances for this patient secondary to abnormal urine drug screen.

## 2024-10-21 ENCOUNTER — PATIENT MESSAGE (OUTPATIENT)
Dept: CARDIOLOGY | Facility: CLINIC | Age: 39
End: 2024-10-21
Payer: MEDICAID

## 2024-10-23 ENCOUNTER — OFFICE VISIT (OUTPATIENT)
Dept: FAMILY MEDICINE | Facility: CLINIC | Age: 39
End: 2024-10-23
Payer: MEDICAID

## 2024-10-23 VITALS
WEIGHT: 121 LBS | HEIGHT: 61 IN | SYSTOLIC BLOOD PRESSURE: 90 MMHG | DIASTOLIC BLOOD PRESSURE: 60 MMHG | HEART RATE: 88 BPM | BODY MASS INDEX: 22.84 KG/M2

## 2024-10-23 DIAGNOSIS — R25.3 MUSCLE TWITCHING: ICD-10-CM

## 2024-10-23 DIAGNOSIS — F51.01 PRIMARY INSOMNIA: Primary | ICD-10-CM

## 2024-10-23 PROCEDURE — 3078F DIAST BP <80 MM HG: CPT | Mod: CPTII,,, | Performed by: FAMILY MEDICINE

## 2024-10-23 PROCEDURE — 3074F SYST BP LT 130 MM HG: CPT | Mod: CPTII,,, | Performed by: FAMILY MEDICINE

## 2024-10-23 PROCEDURE — 99214 OFFICE O/P EST MOD 30 MIN: CPT | Mod: ,,, | Performed by: FAMILY MEDICINE

## 2024-10-23 PROCEDURE — 1159F MED LIST DOCD IN RCRD: CPT | Mod: CPTII,,, | Performed by: FAMILY MEDICINE

## 2024-10-23 PROCEDURE — 3008F BODY MASS INDEX DOCD: CPT | Mod: CPTII,,, | Performed by: FAMILY MEDICINE

## 2024-10-23 NOTE — PROGRESS NOTES
"              Ezra Morrison IV, DO  [facility]  [address] [place] [address]  Phone: [phone]      Subjective     Name: Windy Alcocer   Sex: female  YOB: 1985 (39 y.o.)  MRN: [id number]  Visit Date: 10/23/2024   Chief Complaint: Spasms        HISTORY OF PRESENT ILLNESS:    Chief Complaint   Patient presents with    Spasms       HPI  See tests that keep you healthy and the problem oriented documentation below.    Tests to Keep You Healthy    Cervical Cancer Screening: Met on 9/17/2024  Tobacco Cessation: NO      Portions of this note may have been created with voice recognition software. Occasional "wrong-word" or "sound-a-like" substitutions may have occurred due to the inherent limitations of voice recognition software. Please, read the note carefully and recognize, using context, where substitutions have occurred.     PAST MEDICAL HISTORY:  Significant Diagnoses - Patient  has a past medical history of Anxiety and Depression.  Medications - Patient is not on any long-term medications.   Allergies - Patient has No Known Allergies.  Surgeries - Patient  has a past surgical history that includes Breast surgery and Left heart catheterization (N/A, 3/19/2024).  Family History - Patient family history includes Hypertension in her father and mother.      SOCIAL HISTORY:  Tobacco - Patient  reports that she has been smoking cigarettes. She has been exposed to tobacco smoke. She has never used smokeless tobacco.   Alcohol - Patient  reports that she does not currently use alcohol.   Recreational Drugs - Patient  reports that she does not currently use drugs after having used the following drugs: Marijuana and Hydrocodone. Frequency: 2.00 times per week.       Review of Systems   All other systems reviewed and are negative.       Past Medical History:   Diagnosis Date    Anxiety     Depression         Review of patient's allergies indicates:  No Known Allergies     Past " "Surgical History:   Procedure Laterality Date    BREAST SURGERY      LEFT HEART CATHETERIZATION N/A 3/19/2024    Procedure: Left heart cath;  Surgeon: August Guajardo MD;  Location: [facility] CATH LAB;  Service: Cardiology;  Laterality: N/A;        Family History   Problem Relation Name Age of Onset    Hypertension Mother      Hypertension Father         No current outpatient medications     Objective     BP 90/60   Pulse 88   Ht 5' 1" (1.549 m)   Wt 54.9 kg (121 lb)   BMI 22.86 kg/m²     Physical Exam  Constitutional:       General: She is not in acute distress.     Appearance: Normal appearance. She is not ill-appearing.   HENT:      Head: Normocephalic and atraumatic.      Right Ear: External ear normal.      Left Ear: External ear normal.   Eyes:      Extraocular Movements: Extraocular movements intact.      Conjunctiva/sclera: Conjunctivae normal.   Cardiovascular:      Rate and Rhythm: Normal rate.      Heart sounds: No murmur heard.  Pulmonary:      Effort: Pulmonary effort is normal.   Musculoskeletal:      Cervical back: Normal range of motion.   Skin:     General: Skin is warm and dry.      Coloration: Skin is not jaundiced.      Findings: No rash.   Neurological:      Mental Status: She is alert.   Psychiatric:         Mood and Affect: Mood normal.        All recently obtained labs have been reviewed and discussed in detail with the patient.   Assessment     1. Primary insomnia    2. Muscle twitching         Plan        Problem List Items Addressed This Visit       Muscle twitching     Undiagnosed new issue with the unclear prognosis.  Patient states that she is still taking clonazepam but is concerned about withdrawal effects.  Patient was not taking medications could consider this possibly related to withdrawal.  But given the extended half-life of clonazepam in the fact that patient is still taking it this is most likely not related.  Medical decision-making for prescription medications is " despite the fact that I can no longer write this medication for patient she should continue her taper.  Patient states that it does not happen any particular area of her body and that it could be all over although she is concerned about her neck and most recent imaging and feels like it may be coming from there.  At this time I am going to recommend referral to Neurology for further evaluation and treatment.         Relevant Orders    Ambulatory referral/consult to Neurology     Other Visit Diagnoses       Primary insomnia    -  Primary    Relevant Orders    Ambulatory referral/consult to Neurology            No follow-ups on file.    Patient advised that is symptoms worsen, they should call or report directly to local emergency department.    Ezra Morrison,   [facility]

## 2024-10-23 NOTE — ASSESSMENT & PLAN NOTE
Undiagnosed new issue with the unclear prognosis.  Patient states that she is still taking clonazepam but is concerned about withdrawal effects.  Patient was not taking medications could consider this possibly related to withdrawal.  But given the extended half-life of clonazepam in the fact that patient is still taking it this is most likely not related.  Medical decision-making for prescription medications is despite the fact that I can no longer write this medication for patient she should continue her taper.  Patient states that it does not happen any particular area of her body and that it could be all over although she is concerned about her neck and most recent imaging and feels like it may be coming from there.  At this time I am going to recommend referral to Neurology for further evaluation and treatment.

## 2024-10-31 ENCOUNTER — HOSPITAL ENCOUNTER (EMERGENCY)
Facility: HOSPITAL | Age: 39
Discharge: HOME | End: 2024-10-31
Payer: MEDICAID

## 2024-10-31 ENCOUNTER — TELEPHONE (OUTPATIENT)
Dept: NEUROLOGY | Facility: CLINIC | Age: 39
End: 2024-10-31
Payer: MEDICAID

## 2024-10-31 VITALS
SYSTOLIC BLOOD PRESSURE: 117 MMHG | OXYGEN SATURATION: 97 % | HEART RATE: 86 BPM | RESPIRATION RATE: 16 BRPM | BODY MASS INDEX: 23.19 KG/M2 | HEIGHT: 61 IN | DIASTOLIC BLOOD PRESSURE: 78 MMHG | TEMPERATURE: 98 F | WEIGHT: 122.81 LBS

## 2024-10-31 DIAGNOSIS — R25.3 MUSCLE TWITCHING: Primary | ICD-10-CM

## 2024-10-31 LAB
ANION GAP SERPL CALCULATED.3IONS-SCNC: 10 MMOL/L (ref 7–16)
BASOPHILS # BLD AUTO: 0.02 K/UL (ref 0–0.2)
BASOPHILS NFR BLD AUTO: 0.4 % (ref 0–1)
BUN SERPL-MCNC: 19 MG/DL (ref 7–18)
BUN/CREAT SERPL: 33 (ref 6–20)
CALCIUM SERPL-MCNC: 8.4 MG/DL (ref 8.5–10.1)
CHLORIDE SERPL-SCNC: 106 MMOL/L (ref 98–107)
CO2 SERPL-SCNC: 27 MMOL/L (ref 21–32)
CREAT SERPL-MCNC: 0.57 MG/DL (ref 0.55–1.02)
DIFFERENTIAL METHOD BLD: ABNORMAL
EGFR (NO RACE VARIABLE) (RUSH/TITUS): 119 ML/MIN/1.73M2
EOSINOPHIL # BLD AUTO: 0.15 K/UL (ref 0–0.5)
EOSINOPHIL NFR BLD AUTO: 2.9 % (ref 1–4)
ERYTHROCYTE [DISTWIDTH] IN BLOOD BY AUTOMATED COUNT: 14.8 % (ref 11.5–14.5)
GLUCOSE SERPL-MCNC: 85 MG/DL (ref 74–106)
HCT VFR BLD AUTO: 33.3 % (ref 38–47)
HGB BLD-MCNC: 10.5 G/DL (ref 12–16)
HYPOCHROMIA BLD QL SMEAR: ABNORMAL
IMM GRANULOCYTES # BLD AUTO: 0.01 K/UL (ref 0–0.04)
IMM GRANULOCYTES NFR BLD: 0.2 % (ref 0–0.4)
LYMPHOCYTES # BLD AUTO: 2.94 K/UL (ref 1–4.8)
LYMPHOCYTES NFR BLD AUTO: 56.9 % (ref 27–41)
MCH RBC QN AUTO: 22.8 PG (ref 27–31)
MCHC RBC AUTO-ENTMCNC: 31.5 G/DL (ref 32–36)
MCV RBC AUTO: 72.2 FL (ref 80–96)
MICROCYTES BLD QL SMEAR: ABNORMAL
MONOCYTES # BLD AUTO: 0.46 K/UL (ref 0–0.8)
MONOCYTES NFR BLD AUTO: 8.9 % (ref 2–6)
MPC BLD CALC-MCNC: ABNORMAL G/DL
NEUTROPHILS # BLD AUTO: 1.59 K/UL (ref 1.8–7.7)
NEUTROPHILS NFR BLD AUTO: 30.7 % (ref 53–65)
NRBC # BLD AUTO: 0 X10E3/UL
NRBC, AUTO (.00): 0 %
OVALOCYTES BLD QL SMEAR: ABNORMAL
PLATELET # BLD AUTO: 161 K/UL (ref 150–400)
PLATELET MORPHOLOGY: ABNORMAL
POTASSIUM SERPL-SCNC: 4.2 MMOL/L (ref 3.5–5.1)
RBC # BLD AUTO: 4.61 M/UL (ref 4.2–5.4)
SCHISTOCYTES BLD QL AUTO: ABNORMAL
SODIUM SERPL-SCNC: 139 MMOL/L (ref 136–145)
WBC # BLD AUTO: 5.17 K/UL (ref 4.5–11)

## 2024-10-31 PROCEDURE — 99283 EMERGENCY DEPT VISIT LOW MDM: CPT

## 2024-10-31 PROCEDURE — 80048 BASIC METABOLIC PNL TOTAL CA: CPT | Performed by: NURSE PRACTITIONER

## 2024-10-31 PROCEDURE — 36415 COLL VENOUS BLD VENIPUNCTURE: CPT | Performed by: NURSE PRACTITIONER

## 2024-10-31 PROCEDURE — 85025 COMPLETE CBC W/AUTO DIFF WBC: CPT | Performed by: NURSE PRACTITIONER

## 2024-10-31 PROCEDURE — 99284 EMERGENCY DEPT VISIT MOD MDM: CPT | Mod: ,,, | Performed by: NURSE PRACTITIONER

## 2024-11-01 NOTE — ED PROVIDER NOTES
Encounter Date: 10/31/2024       History     Chief Complaint   Patient presents with    Muscle Pain     Presented with c/o muscle twitching that started about 3 weeks ago.  was lying down tonight when her legs became restless and twitched then face started twitching. Denies headache, fever, neck pain, focal weakness.  was evaluated at [facility] last night and discharged home.  is just concerned at to why she is twitching. Of note, pt is a freq client in this and other ED as noted in EMR. She reports at history of anxiety and admits to be overly obsessed and anxious regarding her health. She notes that her PCP would not renew her rx for Klonipin. She reports that she was taking 1 mg every am and 1.5mg every night for over 2 years but has abruptly decreased the dose to 0.5mg qd to qod to try to conserve what she has left. She notes that her dose changed approx 3 weeks ago.      Review of patient's allergies indicates:  No Known Allergies  Past Medical History:   Diagnosis Date    Anxiety     Depression      Past Surgical History:   Procedure Laterality Date    BREAST SURGERY      LEFT HEART CATHETERIZATION N/A 3/19/2024    Procedure: Left heart cath;  Surgeon: August Guajardo MD;  Location: [facility] CATH LAB;  Service: Cardiology;  Laterality: N/A;     Family History   Problem Relation Name Age of Onset    Hypertension Mother      Hypertension Father       Social History     Tobacco Use    Smoking status: Every Day     Current packs/day: 0.50     Types: Cigarettes     Passive exposure: Current    Smokeless tobacco: Never   Substance Use Topics    Alcohol use: Not Currently     Comment: occasionally    Drug use: Not Currently     Frequency: 2.0 times per week     Types: Marijuana, Hydrocodone     Comment: last smoked 4 days ago     Review of Systems   Constitutional:  Negative for activity change, appetite change and fever.   HENT:  Negative for congestion.    Respiratory:  Negative for  cough, chest tightness, shortness of breath and wheezing.    Cardiovascular:  Negative for chest pain and palpitations.   Gastrointestinal:  Negative for abdominal pain, diarrhea, nausea and vomiting.   Genitourinary:  Negative for difficulty urinating and dysuria.   Musculoskeletal:  Positive for myalgias. Negative for arthralgias.   Skin: Negative.    Neurological:  Negative for dizziness, speech difficulty, weakness and light-headedness.   Psychiatric/Behavioral:  The patient is nervous/anxious.        Physical Exam     Initial Vitals [10/31/24 2030]   BP Pulse Resp Temp SpO2   117/78 86 16 98.3 °F (36.8 °C) 97 %      MAP       --         Physical Exam    Nursing note and vitals reviewed.  Constitutional: She appears well-developed and well-nourished. No distress.   HENT:   Head: Normocephalic.   Right Ear: External ear normal.   Left Ear: External ear normal.   Nose: Nose normal. Mouth/Throat: Oropharynx is clear and moist.   Eyes: Conjunctivae and EOM are normal. Pupils are equal, round, and reactive to light.   Neck: Neck supple. No JVD present.   Normal range of motion.  Cardiovascular:  Normal rate, regular rhythm, normal heart sounds and intact distal pulses.           No murmur heard.  Pulmonary/Chest: She has no wheezes. She has no rhonchi. She has no rales.   Abdominal: Abdomen is soft. Bowel sounds are normal. There is no abdominal tenderness.   Musculoskeletal:         General: No tenderness or edema. Normal range of motion.      Cervical back: Normal range of motion and neck supple.     Neurological: She is alert and oriented to person, place, and time. She has normal strength. GCS score is 15. GCS eye subscore is 4. GCS verbal subscore is 5. GCS motor subscore is 6.   Skin: Skin is warm and dry. Capillary refill takes less than 2 seconds.         Medical Screening Exam   See Full Note    ED Course   Procedures  Labs Reviewed   BASIC METABOLIC PANEL - Abnormal       Result Value    Sodium 139       Potassium 4.2      Chloride 106      CO2 27      Anion Gap 10      Glucose 85      BUN 19 (*)     Creatinine 0.57      BUN/Creatinine Ratio 33 (*)     Calcium 8.4 (*)     eGFR 119     CBC WITH DIFFERENTIAL - Abnormal    WBC 5.17      RBC 4.61      Hemoglobin 10.5 (*)     Hematocrit 33.3 (*)     MCV 72.2 (*)     MCH 22.8 (*)     MCHC 31.5 (*)     RDW 14.8 (*)     Platelet Count 161      MPV        Neutrophils % 30.7 (*)     Lymphocytes % 56.9 (*)     Monocytes % 8.9 (*)     Eosinophils % 2.9      Basophils % 0.4      Immature Granulocytes % 0.2      nRBC, Auto 0.0      Neutrophils, Abs 1.59 (*)     Lymphocytes, Absolute 2.94      Monocytes, Absolute 0.46      Eosinophils, Absolute 0.15      Basophils, Absolute 0.02      Immature Granulocytes, Absolute 0.01      nRBC, Absolute 0.00      Diff Type Scan Smear     CBC MORPHOLOGY - Abnormal    Platelet Morphology Few Large Platelets (*)     Microcytosis 1+      Ovalocytes Few      Hypochromic Few      Schistocytes Few     CBC W/ AUTO DIFFERENTIAL    Narrative:     The following orders were created for panel order CBC Auto Differential.  Procedure                               Abnormality         Status                     ---------                               -----------         ------                     CBC with Differential[[id number]]       Abnormal            Final result                 Please view results for these tests on the individual orders.          Imaging Results    None          Medications - No data to display  Medical Decision Making  Presented with c/o muscle twitching that started about 3 weeks ago.  was lying down tonight when her legs became restless and twitched then face started twitching. Denies headache, fever, neck pain, focal weakness.  was evaluated at [facility] last night and discharged home.  is just concerned at to why she is twitching. Of note, pt is a freq client in this and other ED as noted in EMR. She  reports at history of anxiety and admits to be overly obsessed and anxious regarding her health. She notes that her PCP would not renew her rx for Klonipin. She reports that she was taking 1 mg every am and 1.5mg every night for over 2 years but has abruptly decreased the dose to 0.5mg qd to qod to try to conserve what she has left. She notes that her dose changed approx 3 weeks ago.    Amount and/or Complexity of Data Reviewed  Labs: ordered. Decision-making details documented in ED Course.     Details: Na 139, K+ 4.2, BUN 19, creatinine 0.57, WBC 5170 with H&H 10.5 and 33.3, plt [id number],    Risk  Risk Details: Discussed assessment and diagnostics with pt. She denies any muscle twitching while in ED. No spasm noted. Pt is stable. Explained that symptoms may be caused from her abrupt change in dose of Klonipin. Denies any homicidal or suicidal ideations. Instructed to continue current dose but to ween off med. Instructed to follow-up with her PCP. Discharged home with written instructions and return precautions provided.               ED Course as of 10/31/24 2326   Thu Oct 31, 2024   2111 Sodium: 139 [DS]   2111 Potassium: 4.2 [DS]   2111 BUN(!): 19 [DS]   2111 Creatinine: 0.57 [DS]   2111 Calcium(!): 8.4 [DS]   2118 WBC: 5.17 [DS]   2118 Hemoglobin(!): 10.5 [DS]   2118 Hematocrit(!): 33.3 [DS]   2118 Platelet Count: 161 [DS]   2118 Neutrophils Relative(!): 30.7 [DS]      ED Course User Index  [DS] Marcela Madrid NP                           Clinical Impression:   Final diagnoses:  [R25.3] Muscle twitching (Primary)        ED Disposition Condition    Discharge Stable          ED Prescriptions    None       Follow-up Information       Follow up With Specialties Details Why Contact Info    Your Primary Care Provider  In 1 week If symptoms persist              Spelter, Marcela, NP  10/31/24 7099

## 2024-11-07 ENCOUNTER — OFFICE VISIT (OUTPATIENT)
Dept: FAMILY MEDICINE | Facility: CLINIC | Age: 39
End: 2024-11-07
Payer: MEDICAID

## 2024-11-07 VITALS
BODY MASS INDEX: 22.66 KG/M2 | HEIGHT: 61 IN | DIASTOLIC BLOOD PRESSURE: 69 MMHG | HEART RATE: 94 BPM | WEIGHT: 120 LBS | SYSTOLIC BLOOD PRESSURE: 111 MMHG

## 2024-11-07 DIAGNOSIS — E78.5 HYPERLIPIDEMIA, UNSPECIFIED HYPERLIPIDEMIA TYPE: ICD-10-CM

## 2024-11-07 DIAGNOSIS — Z13.1 SCREENING FOR DIABETES MELLITUS: Primary | ICD-10-CM

## 2024-11-07 LAB
CHOLEST SERPL-MCNC: 154 MG/DL (ref 0–200)
CHOLEST/HDLC SERPL: 2.7 {RATIO}
EST. AVERAGE GLUCOSE BLD GHB EST-MCNC: 100 MG/DL
HBA1C MFR BLD HPLC: 5.1 % (ref 4.5–6.6)
HDLC SERPL-MCNC: 57 MG/DL (ref 40–60)
LDLC SERPL CALC-MCNC: 88 MG/DL
LDLC/HDLC SERPL: 1.5 {RATIO}
NONHDLC SERPL-MCNC: 97 MG/DL
TRIGL SERPL-MCNC: 45 MG/DL (ref 35–150)
VLDLC SERPL-MCNC: 9 MG/DL

## 2024-11-07 RX ORDER — CLONAZEPAM 1 MG/1
1 TABLET ORAL 2 TIMES DAILY PRN
COMMUNITY

## 2024-11-07 NOTE — PROGRESS NOTES
"              Ezra Morrison IV, DO  [facility]  [address] [place] [address]  Phone: [phone]      Subjective     Name: Windy Alcocer   Sex: female  YOB: 1985 (39 y.o.)  MRN: [id number]  Visit Date: 11/07/2024   Chief Complaint: Hypotension        HISTORY OF PRESENT ILLNESS:    Chief Complaint   Patient presents with    Hypotension       HPI  See tests that keep you healthy and the problem oriented documentation below.    Tests to Keep You Healthy    Cervical Cancer Screening: Met on 9/17/2024  Tobacco Cessation: NO      Portions of this note may have been created with voice recognition software. Occasional "wrong-word" or "sound-a-like" substitutions may have occurred due to the inherent limitations of voice recognition software. Please, read the note carefully and recognize, using context, where substitutions have occurred.     PAST MEDICAL HISTORY:  Significant Diagnoses - Patient  has a past medical history of Anxiety and Depression.  Medications - Patient is not on any long-term medications.   Allergies - Patient has No Known Allergies.  Surgeries - Patient  has a past surgical history that includes Breast surgery and Left heart catheterization (N/A, 3/19/2024).  Family History - Patient family history includes Hypertension in her father and mother.      SOCIAL HISTORY:  Tobacco - Patient  reports that she has been smoking cigarettes. She has been exposed to tobacco smoke. She has never used smokeless tobacco.   Alcohol - Patient  reports that she does not currently use alcohol.   Recreational Drugs - Patient  reports that she does not currently use drugs after having used the following drugs: Marijuana and Hydrocodone. Frequency: 2.00 times per week.       Review of Systems   Constitutional:  Negative for activity change and unexpected weight change.   HENT:  Positive for rhinorrhea. Negative for hearing loss and trouble swallowing.    Eyes:  Positive for " Admitting Diagnosis:   Patient is a 79y old  Female who presents with a chief complaint of Abdominal wound and multifactorial sepsis (2018 21:02)      PAST MEDICAL & SURGICAL HISTORY:  Hypothyroidism  GERD (gastroesophageal reflux disease)  Obesity  DM (diabetes mellitus)  HLD (hyperlipidemia)  HTN (hypertension)  H/O ventral hernia repair      Current Nutrition Order:  TPN via central line @ 300g Dextrose, 72g AA, 50g Lipids to provide 1808 kcal, 72g protein, GIR 2.23, 1.2g/kg IBW protein   Osmolite 1.2 Nicholas @ 10mL/hr x 24hrs via NGT providin mL TV, 288 kcal, 13g protein, 197mL free H2O, .21g/kg IBW protein     Total nutrition support providin kcal, 85g protein. 1.39g/kg IBW protein    PO Intake: Good (%) [   ]  Fair (50-75%) [   ] Poor (<25%) [   ]- N/A NPO    GI Issues: Rectal tube in place    Pain: Unable to assess at this time 2/2 vent     Skin Integrity: Open abdominal wound w/vac, B/L buttocks stage II, generalized 2+ edema, L. IJ drain      Labs:       136  |  95<L>  |  60<H>  ----------------------------<  131<H>  4.0   |  30  |  1.05    Ca    10.2      07 May 2018 04:46  Phos  3.3     05-07  Mg     2.6     05-07      CAPILLARY BLOOD GLUCOSE      POCT Blood Glucose.: 203 mg/dL (07 May 2018 12:01)  POCT Blood Glucose.: 154 mg/dL (07 May 2018 05:07)  POCT Blood Glucose.: 136 mg/dL (06 May 2018 23:11)  POCT Blood Glucose.: 110 mg/dL (06 May 2018 18:00)      Medications:  MEDICATIONS  (STANDING):  chlorhexidine 0.12% Liquid 15 milliLiter(s) Swish and Spit two times a day  dexmedetomidine Infusion 0.2 MICROgram(s)/kG/Hr (4.68 mL/Hr) IV Continuous <Continuous>  dextrose 5%. 1000 milliLiter(s) (50 mL/Hr) IV Continuous <Continuous>  dextrose 50% Injectable 12.5 Gram(s) IV Push once  dextrose 50% Injectable 25 Gram(s) IV Push once  dextrose 50% Injectable 25 Gram(s) IV Push once  fat emulsion (Plant Based) 20% Infusion 0.535 Gm/kG/Day (20.8 mL/Hr) IV Continuous <Continuous>  furosemide   Injectable 80 milliGRAM(s) IV Push two times a day  insulin lispro (HumaLOG) corrective regimen sliding scale   SubCutaneous every 6 hours  leuprolide depot Injectable (LUPRON-DEPOT) 7.5 milliGRAM(s) IntraMuscular every 4 weeks  levothyroxine Injectable 75 MICROGram(s) IV Push <User Schedule>  meropenem Injectable 1000 milliGRAM(s) IV Push every 12 hours  metolazone 10 milliGRAM(s) Oral daily  micafungin IVPB 100 milliGRAM(s) IV Intermittent daily  pantoprazole   Suspension 40 milliGRAM(s) Oral daily  Parenteral Nutrition - Adult 1 Each (53 mL/Hr) TPN Continuous <Continuous>  Parenteral Nutrition - Adult 1 Each (53 mL/Hr) TPN Continuous <Continuous>    MEDICATIONS  (PRN):  ALBUTerol/ipratropium for Nebulization 3 milliLiter(s) Nebulizer every 6 hours PRN Shortness of Breath and/or Wheezing  dextrose Gel 1 Dose(s) Oral once PRN Blood Glucose LESS THAN 70 milliGRAM(s)/deciliter  fentaNYL    Injectable 25 MICROGram(s) IV Push every 4 hours PRN Moderate Pain (4 - 6)  fentaNYL    Injectable 50 MICROGram(s) IV Push every 4 hours PRN Severe Pain (7 - 10)  glucagon  Injectable 1 milliGRAM(s) IntraMuscular once PRN Glucose LESS THAN 70 milligrams/deciliter  haloperidol    Injectable 1 milliGRAM(s) IntraMuscular every 6 hours PRN Agitation  ondansetron Injectable 4 milliGRAM(s) IV Push every 6 hours PRN Nausea      Weight: 93.6kg  Daily     Daily     Weight Change: No new weights recorded since admit     Estimated energy needs: Ideal body weight (61kg) used for calculations as pt >120% of IBW. needs estimated for older age; adjusted for vent, sepsis, wound healing  Calories: 25-30 kcal/kg = 1050-7775 kcal/day  Protein: 1.4-1.6 g/kg = 85-98g protein/day  Fluids: 30-35 mL/kg = 4472-3741 mL/day    Subjective: 78 yo/female with PMhx DM, HTN, recent ventral hernia repair c/b PE, anemia, GIB, admitted with sepsis 2/2 abdominal wound, UTI and bacteremia. Also found to have small bowel perf w/contained leak. Pt intubated for airway protection on . Pt seen in room, asleep, though sedation being titrated down and pt's eyes opened while RD in room, smiling. Tolerating CPAP trial. MAP 85. TF running at goal rate of 10ml/hr. TPN running with lipids. GOC still pending. Possibly for trach. Lytes WNL. BUN 60 trending down, Cr WNL.     Previous Nutrition Diagnosis:  Inadequate oral intake RT inability to meet needs via oral intake 2/2 AMS AEB NPO    Active [ X- nutrition support dependent  ]  Resolved [   ]    If resolved, new PES:     Goal: Pt will continue to meet % of EER via tolerated route(s)     Recommendations:  1. Continue with TPN order of 300g Dextrose, 72g AA, 50g Lipids to provide 1808 kcal, 72g protein, GIR 2.23, 1.2g/kg IBW protein   Recommend checking TG weekly (*next due ). Check Mg, Phos, K daily and POC BG Q6hrs. Trend daily weights. Fluids and lytes per MD discretion.   2. Continue with trickle feeds of Osmolite 1.2 Nicholas; continue to use the gut as feasible and tolerated (increase to 20mL/hr as tolerated and feasible)  3. Keep nutrition in line with care at all times   4. Trend weights (RN aware to take new weight)    Education: N/A 2/2 vent     Risk Level: High [ X  ] Moderate [   ] Low [   ]. "discharge. Negative for visual disturbance.   Respiratory:  Positive for chest tightness. Negative for wheezing.    Cardiovascular:  Positive for chest pain and palpitations.   Gastrointestinal:  Negative for blood in stool, constipation, diarrhea and vomiting.   Endocrine: Negative for polydipsia and polyuria.   Genitourinary:  Negative for difficulty urinating, dysuria, hematuria and menstrual problem.   Musculoskeletal:  Positive for arthralgias and neck pain. Negative for joint swelling.   Neurological:  Positive for headaches. Negative for weakness.   Psychiatric/Behavioral:  Positive for dysphoric mood. Negative for confusion.    All other systems reviewed and are negative.       Past Medical History:   Diagnosis Date    Anxiety     Depression         Review of patient's allergies indicates:  No Known Allergies     Past Surgical History:   Procedure Laterality Date    BREAST SURGERY      LEFT HEART CATHETERIZATION N/A 3/19/2024    Procedure: Left heart cath;  Surgeon: August Guajardo MD;  Location: [facility] CATH LAB;  Service: Cardiology;  Laterality: N/A;        Family History   Problem Relation Name Age of Onset    Hypertension Mother      Hypertension Father         Current Outpatient Medications   Medication Instructions    clonazePAM (KLONOPIN) 1 mg, 2 times daily PRN        Objective     /69 (BP Location: Right arm, Patient Position: Sitting)   Pulse 94   Ht 5' 1" (1.549 m)   Wt 54.4 kg (120 lb)   LMP 10/28/2024   BMI 22.67 kg/m²     Physical Exam  Constitutional:       General: She is not in acute distress.     Appearance: Normal appearance. She is not ill-appearing.   HENT:      Head: Normocephalic and atraumatic.      Right Ear: External ear normal.      Left Ear: External ear normal.   Eyes:      Extraocular Movements: Extraocular movements intact.      Conjunctiva/sclera: Conjunctivae normal.   Cardiovascular:      Rate and Rhythm: Normal rate.      Heart sounds: No murmur " heard.  Pulmonary:      Effort: Pulmonary effort is normal.   Musculoskeletal:      Cervical back: Normal range of motion.   Skin:     General: Skin is warm and dry.      Coloration: Skin is not jaundiced.      Findings: No rash.   Neurological:      Mental Status: She is alert.   Psychiatric:         Mood and Affect: Mood normal.        All recently obtained labs have been reviewed and discussed in detail with the patient.   Assessment     1. Screening for diabetes mellitus    2. Hyperlipidemia, unspecified hyperlipidemia type         Plan        Problem List Items Addressed This Visit    None  Visit Diagnoses       Screening for diabetes mellitus    -  Primary    Relevant Orders    Hemoglobin A1C    Hyperlipidemia, unspecified hyperlipidemia type        Relevant Orders    Lipid Panel            No follow-ups on file.    Patient advised that is symptoms worsen, they should call or report directly to local emergency department.    Ezra Morrison,   [facility]

## 2024-11-11 ENCOUNTER — OFFICE VISIT (OUTPATIENT)
Dept: CARDIOLOGY | Facility: CLINIC | Age: 39
End: 2024-11-11
Payer: MEDICAID

## 2024-11-11 VITALS
OXYGEN SATURATION: 98 % | HEIGHT: 61 IN | SYSTOLIC BLOOD PRESSURE: 102 MMHG | HEART RATE: 104 BPM | DIASTOLIC BLOOD PRESSURE: 66 MMHG | WEIGHT: 120 LBS | BODY MASS INDEX: 22.66 KG/M2

## 2024-11-11 DIAGNOSIS — I50.32 CHRONIC DIASTOLIC HEART FAILURE: ICD-10-CM

## 2024-11-11 DIAGNOSIS — R00.2 PALPITATIONS: Primary | ICD-10-CM

## 2024-11-11 DIAGNOSIS — I25.10 NONOCCLUSIVE CORONARY ATHEROSCLEROSIS OF NATIVE CORONARY ARTERY: ICD-10-CM

## 2024-11-11 PROCEDURE — 99213 OFFICE O/P EST LOW 20 MIN: CPT | Mod: S$PBB,,, | Performed by: NURSE PRACTITIONER

## 2024-11-11 PROCEDURE — 3074F SYST BP LT 130 MM HG: CPT | Mod: CPTII,,, | Performed by: NURSE PRACTITIONER

## 2024-11-11 PROCEDURE — 99999 PR PBB SHADOW E&M-EST. PATIENT-LVL III: CPT | Mod: PBBFAC,,, | Performed by: NURSE PRACTITIONER

## 2024-11-11 PROCEDURE — 3044F HG A1C LEVEL LT 7.0%: CPT | Mod: CPTII,,, | Performed by: NURSE PRACTITIONER

## 2024-11-11 PROCEDURE — 3008F BODY MASS INDEX DOCD: CPT | Mod: CPTII,,, | Performed by: NURSE PRACTITIONER

## 2024-11-11 PROCEDURE — 99213 OFFICE O/P EST LOW 20 MIN: CPT | Mod: PBBFAC | Performed by: NURSE PRACTITIONER

## 2024-11-11 PROCEDURE — 3078F DIAST BP <80 MM HG: CPT | Mod: CPTII,,, | Performed by: NURSE PRACTITIONER

## 2024-11-11 PROCEDURE — 1159F MED LIST DOCD IN RCRD: CPT | Mod: CPTII,,, | Performed by: NURSE PRACTITIONER

## 2024-11-11 NOTE — PROGRESS NOTES
PCP: Ezra Morrison IV, DO    Referring Provider:     Subjective:   Windy Alcocer is a 39 y.o. female, current smoker, with hx of anxiety and depression who presents for follow up.    11/11/24: Pt has c/o palpitations and left arm swelling. Denies any chest pain or SOB. She also has c/o muscle twitching. She states she is being followed by hematology for thrombocytopenia and is receiving iron infusions.    9/26/24: Pt presents with c/o left arm pain and concerned that it is due to her heart. She had an episode that woke her from sleep, she reports she was coughing, had chest pain along with back pain and left arm pain. Reassurance given (LHC 03/2024 showed very mild nonobstructive CAD). On exam, her left inner forearm/wrist area is swollen. She states she first noticed the swelling approximately one month ago. She is concerned that it may be a blood clot. She states her whole left arm is hurting and hurts more with movement. Pt feels like no one is listening to her complaints or doing anything about her symptoms.     08/29/24: Pt was last seen by Dr. Guajardo 7/29/24 for palpitations and chest pain. ZIO cardiac monitor was placed. ZIO results are normal. She had a LHC 03/2024 that showed mild nonobstructive CAD. Echo 02/2024 showed LVEF 40% and LVDD (Grade undetermined). Repeat Echo 05/2024 showed normal LVEF with Grade I LVDD.     Today, pt is doing well. Denies chest pain, SOB or any other cardiac complaints today. She states she has quit marijuana and is trying to quit smoking.         Fhx: No premature CAD  Shx: current smoker, occasional etoh use, states she has quit marijuana    EKG   Results for orders placed or performed during the hospital encounter of 09/03/24   EKG 12-lead    Collection Time: 09/03/24 11:30 PM   Result Value Ref Range    QRS Duration 78 ms    OHS QTC Calculation 434 ms    Narrative    Test Reason : R06.02,    Vent. Rate : 096 BPM     Atrial Rate : 096 BPM     P-R Int : 214 ms          QRS  Dur : 078 ms      QT Int : 344 ms       P-R-T Axes : 074 -18 069 degrees     QTc Int : 434 ms    Sinus rhythm with 1st degree A-V block  Otherwise normal ECG  When compared with ECG of 05-JUL-2024 17:18,  No significant change was found  Confirmed by Reinaldo Little MD ([id number]) on 9/16/2024 12:14:11 AM    Referred By: [email or web address]   SELF           Confirmed By:Reinaldo Little MD     ECHO Results for orders placed during the hospital encounter of 05/28/24    Echo    Interpretation Summary    Left Ventricle: The left ventricle is normal in size. Normal wall thickness. There is normal systolic function. Grade I diastolic dysfunction.    Right Ventricle: Normal right ventricular cavity size. Systolic function is borderline low.    Aortic Valve: The aortic valve is a trileaflet valve.    Pulmonic Valve: There is mild regurgitation.    Pulmonary Artery: The estimated pulmonary artery systolic pressure is 15 mmHg.    IVC/SVC: Normal venous pressure at 3 mmHg.    LHC Results for orders placed during the hospital encounter of 03/19/24    Cardiac catheterization    Conclusion    The pre-procedure left ventricular end diastolic pressure was 10.    The estimated blood loss was none.    There was non-obstructive coronary artery disease..    Non-obstructive cors  Co-dominant circulation  Minor luminal irregularities only  R radial access, no complications    The procedure log was documented by Documenter: Nataly Sesay and verified by Aguust Guajardo MD.    Date: 3/22/2024  Time: 9:13 PM    ZIO cardiac monitor - 7/29/24 - grossly normal study, relatively high average HR    Lab Results   Component Value Date     10/31/2024    K 4.2 10/31/2024     10/31/2024    CO2 27 10/31/2024    BUN 19 (H) 10/31/2024    CREATININE 0.57 10/31/2024    CALCIUM 8.4 (L) 10/31/2024    ANIONGAP 10 10/31/2024       Lab Results   Component Value Date    CHOL 154 11/07/2024    CHOL 165 03/26/2024     Lab Results   Component Value Date    HDL  "57 11/07/2024    HDL 50 03/26/2024     Lab Results   Component Value Date    LDLCALC 88 11/07/2024    LDLCALC 101 03/26/2024     Lab Results   Component Value Date    TRIG 45 11/07/2024    TRIG 68 03/26/2024     Lab Results   Component Value Date    CHOLHDL 2.7 11/07/2024    CHOLHDL 3.3 03/26/2024       Lab Results   Component Value Date    WBC 5.17 10/31/2024    HGB 10.5 (L) 10/31/2024    HCT 33.3 (L) 10/31/2024    MCV 72.2 (L) 10/31/2024     10/31/2024         No current outpatient medications on file.    Review of Systems   Constitutional:  Negative for chills, diaphoresis, fever and malaise/fatigue.   Respiratory:  Negative for cough and shortness of breath.    Cardiovascular:  Negative for chest pain, palpitations, orthopnea, leg swelling and PND.   Gastrointestinal:  Negative for abdominal pain, nausea and vomiting.   Musculoskeletal:  Negative for falls.   Neurological:  Negative for focal weakness and weakness.         Objective:   /66 (BP Location: Left arm, Patient Position: Sitting)   Pulse 104   Ht 5' 1" (1.549 m)   Wt 54.4 kg (120 lb)   LMP 10/28/2024   SpO2 98%   BMI 22.67 kg/m²     Physical Exam  Constitutional:       General: She is not in acute distress.     Appearance: Normal appearance.   Cardiovascular:      Rate and Rhythm: Normal rate and regular rhythm.   Pulmonary:      Effort: Pulmonary effort is normal.      Breath sounds: Normal breath sounds.   Musculoskeletal:      Cervical back: Neck supple. No rigidity.      Right lower leg: No edema.      Left lower leg: No edema.   Skin:     General: Skin is warm and dry.   Neurological:      Mental Status: She is alert.           Assessment:     1. Palpitations        2. Nonocclusive coronary atherosclerosis of native coronary artery        3. Chronic diastolic heart failure                Plan:   Palpitations  Zio cardiac monitor was normal 07/29/2024    Nonocclusive coronary atherosclerosis of native coronary artery  - [facility] " 03/2024 showed very mild nonobstructive CAD    Chronic diastolic heart failure  - echo 05/2024 showed normal LVEF with Grade I LVDD  - euvolemic/asymptomatic  - BP is well-controlled without any medication      Follow-up with PCP for continued left forearm swelling (x-ray and ultrasound were normal 09/2024)    Follow up with Dr. Guajardo in 1 year

## 2024-11-11 NOTE — ASSESSMENT & PLAN NOTE
- echo 05/2024 showed normal LVEF with Grade I LVDD  - euvolemic/asymptomatic  - BP is well-controlled without any medication

## 2024-11-19 ENCOUNTER — OFFICE VISIT (OUTPATIENT)
Dept: FAMILY MEDICINE | Facility: CLINIC | Age: 39
End: 2024-11-19
Payer: MEDICAID

## 2024-11-19 ENCOUNTER — LAB VISIT (OUTPATIENT)
Dept: LAB | Facility: HOSPITAL | Age: 39
End: 2024-11-19
Attending: FAMILY MEDICINE
Payer: MEDICAID

## 2024-11-19 VITALS
SYSTOLIC BLOOD PRESSURE: 95 MMHG | DIASTOLIC BLOOD PRESSURE: 67 MMHG | BODY MASS INDEX: 22.84 KG/M2 | HEIGHT: 61 IN | WEIGHT: 121 LBS | HEART RATE: 105 BPM

## 2024-11-19 DIAGNOSIS — D50.9 MICROCYTIC ANEMIA: ICD-10-CM

## 2024-11-19 DIAGNOSIS — D50.9 MICROCYTIC ANEMIA: Primary | Chronic | ICD-10-CM

## 2024-11-19 DIAGNOSIS — F41.9 ANXIETY: Chronic | ICD-10-CM

## 2024-11-19 LAB
BASOPHILS # BLD AUTO: 0.02 K/UL (ref 0–0.2)
BASOPHILS NFR BLD AUTO: 0.4 % (ref 0–1)
DIFFERENTIAL METHOD BLD: ABNORMAL
EOSINOPHIL # BLD AUTO: 0.23 K/UL (ref 0–0.5)
EOSINOPHIL NFR BLD AUTO: 4.1 % (ref 1–4)
ERYTHROCYTE [DISTWIDTH] IN BLOOD BY AUTOMATED COUNT: 14.7 % (ref 11.5–14.5)
FERRITIN SERPL-MCNC: 67 NG/ML (ref 5–204)
HCT VFR BLD AUTO: 36.1 % (ref 38–47)
HGB BLD-MCNC: 11.1 G/DL (ref 12–16)
HYPOCHROMIA BLD QL SMEAR: ABNORMAL
IMM GRANULOCYTES # BLD AUTO: 0.01 K/UL (ref 0–0.04)
IMM GRANULOCYTES NFR BLD: 0.2 % (ref 0–0.4)
IRON SATN MFR SERPL: 71 % (ref 20–50)
IRON SERPL-MCNC: 185 UG/DL (ref 50–170)
LYMPHOCYTES # BLD AUTO: 2.92 K/UL (ref 1–4.8)
LYMPHOCYTES NFR BLD AUTO: 52.2 % (ref 27–41)
MCH RBC QN AUTO: 22.4 PG (ref 27–31)
MCHC RBC AUTO-ENTMCNC: 30.7 G/DL (ref 32–36)
MCV RBC AUTO: 72.9 FL (ref 80–96)
MICROCYTES BLD QL SMEAR: ABNORMAL
MONOCYTES # BLD AUTO: 0.43 K/UL (ref 0–0.8)
MONOCYTES NFR BLD AUTO: 7.7 % (ref 2–6)
MPC BLD CALC-MCNC: ABNORMAL G/DL
NEUTROPHILS # BLD AUTO: 1.98 K/UL (ref 1.8–7.7)
NEUTROPHILS NFR BLD AUTO: 35.4 % (ref 53–65)
NRBC # BLD AUTO: 0 X10E3/UL
NRBC, AUTO (.00): 0 %
OVALOCYTES BLD QL SMEAR: ABNORMAL
PLATELET # BLD AUTO: 233 K/UL (ref 150–400)
PLATELET MORPHOLOGY: ABNORMAL
RBC # BLD AUTO: 4.95 M/UL (ref 4.2–5.4)
SCHISTOCYTES BLD QL AUTO: ABNORMAL
TIBC SERPL-MCNC: 259 UG/DL (ref 250–450)
TIBC SERPL-MCNC: 74 UG/DL (ref 70–310)
TRANSFERRIN SERPL-MCNC: 209 MG/DL (ref 180–382)
TRANSFERRIN SERPL-MCNC: 210 MG/DL (ref 180–382)
WBC # BLD AUTO: 5.59 K/UL (ref 4.5–11)

## 2024-11-19 PROCEDURE — 3078F DIAST BP <80 MM HG: CPT | Mod: CPTII,,, | Performed by: FAMILY MEDICINE

## 2024-11-19 PROCEDURE — 80504 PATH CLIN CONSLTJ MOD 21-40: CPT | Mod: ,,, | Performed by: PATHOLOGY

## 2024-11-19 PROCEDURE — 1159F MED LIST DOCD IN RCRD: CPT | Mod: CPTII,,, | Performed by: FAMILY MEDICINE

## 2024-11-19 PROCEDURE — 3008F BODY MASS INDEX DOCD: CPT | Mod: CPTII,,, | Performed by: FAMILY MEDICINE

## 2024-11-19 PROCEDURE — 36415 COLL VENOUS BLD VENIPUNCTURE: CPT

## 2024-11-19 PROCEDURE — 3044F HG A1C LEVEL LT 7.0%: CPT | Mod: CPTII,,, | Performed by: FAMILY MEDICINE

## 2024-11-19 PROCEDURE — 83550 IRON BINDING TEST: CPT

## 2024-11-19 PROCEDURE — 99214 OFFICE O/P EST MOD 30 MIN: CPT | Mod: ,,, | Performed by: FAMILY MEDICINE

## 2024-11-19 PROCEDURE — 84466 ASSAY OF TRANSFERRIN: CPT

## 2024-11-19 PROCEDURE — 3074F SYST BP LT 130 MM HG: CPT | Mod: CPTII,,, | Performed by: FAMILY MEDICINE

## 2024-11-19 PROCEDURE — 82728 ASSAY OF FERRITIN: CPT

## 2024-11-19 PROCEDURE — 85025 COMPLETE CBC W/AUTO DIFF WBC: CPT

## 2024-11-19 NOTE — ASSESSMENT & PLAN NOTE
Patient currently being followed by Hematology-Oncology.  Was unable to make appointment today secondary to weather.  Does request iron studies as well as CBC.  We will get CBC iron saturation transferrin

## 2024-11-19 NOTE — PROGRESS NOTES
"              Ezra Morrison IV, DO  [facility]  [address] [place] [address]  Phone: [phone]      Subjective     Name: Windy Alcocer   Sex: female  YOB: 1985 (39 y.o.)  MRN: [id number]  Visit Date: 11/19/2024   Chief Complaint: Follow-up        HISTORY OF PRESENT ILLNESS:    Chief Complaint   Patient presents with    Follow-up       HPI  See tests that keep you healthy and the problem oriented documentation below.    Tests to Keep You Healthy    Cervical Cancer Screening: Met on 9/17/2024  Tobacco Cessation: NO      Portions of this note may have been created with voice recognition software. Occasional "wrong-word" or "sound-a-like" substitutions may have occurred due to the inherent limitations of voice recognition software. Please, read the note carefully and recognize, using context, where substitutions have occurred.     PAST MEDICAL HISTORY:  Significant Diagnoses - Patient  has a past medical history of Anxiety and Depression.  Medications - Patient is not on any long-term medications.   Allergies - Patient has No Known Allergies.  Surgeries - Patient  has a past surgical history that includes Breast surgery and Left heart catheterization (N/A, 3/19/2024).  Family History - Patient family history includes Hypertension in her father and mother.      SOCIAL HISTORY:  Tobacco - Patient  reports that she has been smoking cigarettes. She has been exposed to tobacco smoke. She has never used smokeless tobacco.   Alcohol - Patient  reports that she does not currently use alcohol.   Recreational Drugs - Patient  reports that she does not currently use drugs after having used the following drugs: Marijuana and Hydrocodone. Frequency: 2.00 times per week.       Review of Systems   Constitutional:  Negative for activity change and unexpected weight change.   HENT:  Positive for rhinorrhea. Negative for hearing loss and trouble swallowing.    Eyes:  Positive for visual " "disturbance. Negative for discharge.   Respiratory:  Positive for chest tightness. Negative for wheezing.    Cardiovascular:  Positive for palpitations. Negative for chest pain.   Gastrointestinal:  Negative for blood in stool, constipation, diarrhea and vomiting.   Endocrine: Negative for polydipsia and polyuria.   Genitourinary:  Negative for difficulty urinating, dysuria, hematuria and menstrual problem.   Musculoskeletal:  Positive for neck pain. Negative for arthralgias and joint swelling.   Neurological:  Positive for headaches. Negative for weakness.   Psychiatric/Behavioral:  Positive for dysphoric mood. Negative for confusion.    All other systems reviewed and are negative.       Past Medical History:   Diagnosis Date    Anxiety     Depression         Review of patient's allergies indicates:  No Known Allergies     Past Surgical History:   Procedure Laterality Date    BREAST SURGERY      LEFT HEART CATHETERIZATION N/A 3/19/2024    Procedure: Left heart cath;  Surgeon: August Guajardo MD;  Location: [facility] CATH LAB;  Service: Cardiology;  Laterality: N/A;        Family History   Problem Relation Name Age of Onset    Hypertension Mother      Hypertension Father         No current outpatient medications     Objective     BP 95/67   Pulse 105   Ht 5' 1" (1.549 m)   Wt 54.9 kg (121 lb)   LMP 10/28/2024   BMI 22.86 kg/m²     Physical Exam  Constitutional:       General: She is not in acute distress.     Appearance: Normal appearance. She is not ill-appearing.   HENT:      Head: Normocephalic and atraumatic.      Right Ear: External ear normal.      Left Ear: External ear normal.   Eyes:      Extraocular Movements: Extraocular movements intact.      Conjunctiva/sclera: Conjunctivae normal.   Cardiovascular:      Rate and Rhythm: Normal rate.      Heart sounds: No murmur heard.  Pulmonary:      Effort: Pulmonary effort is normal.   Musculoskeletal:      Cervical back: Normal range of motion. "   Skin:     General: Skin is warm and dry.      Coloration: Skin is not jaundiced.      Findings: No rash.   Neurological:      Mental Status: She is alert.   Psychiatric:         Mood and Affect: Mood normal.        All recently obtained labs have been reviewed and discussed in detail with the patient.   Assessment     1. Microcytic anemia    2. Anxiety         Plan        Problem List Items Addressed This Visit       Anxiety (Chronic)     Patient is seen in the emergency department approximately 20 days ago.  At that time she was concerned that is discontinuation of her benzodiazepines or causing issues.  Patient is still taking medication at 0.5 mg p.o. q.d..  In the extremely long half-life of clonazepam it is very unlikely that it is even cleared her system much less causing withdrawal symptoms.  Patient has been advised that she can continue to wean versus discontinue.  Should consider alternative mental health provider to possibly restart anti anxiolytic medications.         Microcytic anemia - Primary (Chronic)     Patient currently being followed by Hematology-Oncology.  Was unable to make appointment today secondary to weather.  Does request iron studies as well as CBC.  We will get CBC iron saturation transferrin         Relevant Orders    Ferritin    Iron and TIBC    Transferrin    Path Review, Peripheral Smear    CBC Auto Differential       No follow-ups on file.    Patient advised that is symptoms worsen, they should call or report directly to local emergency department.    Ezra Morrison, DO  [facility]

## 2024-11-20 ENCOUNTER — PATIENT MESSAGE (OUTPATIENT)
Dept: FAMILY MEDICINE | Facility: CLINIC | Age: 39
End: 2024-11-20
Payer: MEDICAID

## 2024-11-21 LAB — PATH REV BLD -IMP: NORMAL

## 2024-11-27 ENCOUNTER — PATIENT MESSAGE (OUTPATIENT)
Dept: FAMILY MEDICINE | Facility: CLINIC | Age: 39
End: 2024-11-27
Payer: MEDICAID

## 2024-12-11 ENCOUNTER — OFFICE VISIT (OUTPATIENT)
Dept: NEUROLOGY | Facility: CLINIC | Age: 39
End: 2024-12-11
Payer: MEDICAID

## 2024-12-11 VITALS
OXYGEN SATURATION: 99 % | SYSTOLIC BLOOD PRESSURE: 107 MMHG | HEART RATE: 108 BPM | BODY MASS INDEX: 23.03 KG/M2 | WEIGHT: 122 LBS | DIASTOLIC BLOOD PRESSURE: 70 MMHG | HEIGHT: 61 IN | RESPIRATION RATE: 18 BRPM

## 2024-12-11 DIAGNOSIS — E53.8 VITAMIN B12 DEFICIENCY: ICD-10-CM

## 2024-12-11 DIAGNOSIS — R25.3 MUSCLE TWITCHING: ICD-10-CM

## 2024-12-11 DIAGNOSIS — G25.2 ACTION TREMOR: Primary | ICD-10-CM

## 2024-12-11 DIAGNOSIS — E55.9 VITAMIN D DEFICIENCY: ICD-10-CM

## 2024-12-11 DIAGNOSIS — F51.01 PRIMARY INSOMNIA: ICD-10-CM

## 2024-12-11 DIAGNOSIS — E03.9 HYPOTHYROIDISM, UNSPECIFIED TYPE: ICD-10-CM

## 2024-12-11 DIAGNOSIS — F41.9 ANXIETY: Chronic | ICD-10-CM

## 2024-12-11 PROCEDURE — 99214 OFFICE O/P EST MOD 30 MIN: CPT | Mod: PBBFAC | Performed by: NURSE PRACTITIONER

## 2024-12-11 PROCEDURE — 99203 OFFICE O/P NEW LOW 30 MIN: CPT | Mod: S$PBB,,, | Performed by: NURSE PRACTITIONER

## 2024-12-11 PROCEDURE — 3008F BODY MASS INDEX DOCD: CPT | Mod: CPTII,,, | Performed by: NURSE PRACTITIONER

## 2024-12-11 PROCEDURE — 1159F MED LIST DOCD IN RCRD: CPT | Mod: CPTII,,, | Performed by: NURSE PRACTITIONER

## 2024-12-11 PROCEDURE — 3074F SYST BP LT 130 MM HG: CPT | Mod: CPTII,,, | Performed by: NURSE PRACTITIONER

## 2024-12-11 PROCEDURE — 3078F DIAST BP <80 MM HG: CPT | Mod: CPTII,,, | Performed by: NURSE PRACTITIONER

## 2024-12-11 PROCEDURE — 99999 PR PBB SHADOW E&M-EST. PATIENT-LVL IV: CPT | Mod: PBBFAC,,, | Performed by: NURSE PRACTITIONER

## 2024-12-11 PROCEDURE — 3044F HG A1C LEVEL LT 7.0%: CPT | Mod: CPTII,,, | Performed by: NURSE PRACTITIONER

## 2024-12-11 PROCEDURE — 1160F RVW MEDS BY RX/DR IN RCRD: CPT | Mod: CPTII,,, | Performed by: NURSE PRACTITIONER

## 2024-12-11 RX ORDER — CLONAZEPAM 1 MG/1
1 TABLET ORAL 2 TIMES DAILY PRN
COMMUNITY

## 2024-12-11 NOTE — PROGRESS NOTES
Subjective:       Patient ID: Windy Alcocer is a 39 y.o. female     Chief Complaint:  No chief complaint on file.       Allergies:  Patient has no known allergies.    Current Medications:    Outpatient Encounter Medications as of 12/11/2024   Medication Sig Dispense Refill    clonazePAM (KLONOPIN) 1 MG tablet Take 1 mg by mouth 2 (two) times daily as needed for Anxiety.       No facility-administered encounter medications on file as of 12/11/2024.       History of Present Illness  38 y/o female new referral to neurology for reported subjective muscle twitching    Her referral note is reviewed.  Patient with underlying history of anxiety depression, was at that time on reduction treatment of clonazepam and reported intermittent symptoms of twitching without clear localization.  She had felt possibly her neck, however CT cervical spine done at [facility] in September of 2024 did not indicate any significant stenosis or spacing complications.  There is mention of anemia in medical history, no recent vitamin B12 or folate labs to review.    She reports symptoms no clear triggers, but possibly when she becomes upset, so very likely anxiety could be factor, however we will plan to obtain EEG and basic labs.  I see no indication of neurological deficits, there is slight action tremor noted on exam in the BUE, but no other tremor symptoms appreciated on exam.    She asked if I could prescribe her some clonazepam until she has the EEG, I did inform her we did not prescribe that in neurology           Review of Systems  Review of Systems   Constitutional:  Negative for diaphoresis and fever.   HENT:  Negative for congestion, hearing loss and tinnitus.    Eyes:  Negative for blurred vision, double vision, photophobia, discharge and redness.   Respiratory:  Negative for cough and shortness of breath.    Cardiovascular:  Negative for chest pain.   Gastrointestinal:  Negative for abdominal pain, nausea and vomiting.    Musculoskeletal:  Negative for back pain, joint pain, myalgias and neck pain.   Skin:  Negative for itching and rash.   Neurological:  Positive for tremors. Negative for dizziness, sensory change, speech change, focal weakness, seizures, loss of consciousness, weakness and headaches.   Psychiatric/Behavioral:  Negative for depression, hallucinations and memory loss. The patient is nervous/anxious. The patient does not have insomnia.    All other systems reviewed and are negative.     Objective:     NEUROLOGICAL EXAMINATION:     MENTAL STATUS   Oriented to person, place, and time.   Attention: normal. Concentration: normal.   Speech: speech is normal   Level of consciousness: alert  Knowledge: good and consistent with education.   Normal comprehension.     CRANIAL NERVES     CN II   Visual fields full to confrontation.   Visual acuity: normal  Right visual field deficit: none  Left visual field deficit: none     CN III, IV, VI   Pupils are equal, round, and reactive to light.  Extraocular motions are normal.   Right pupil: Size: 3 mm. Shape: regular. Reactivity: brisk. Consensual response: intact. Accommodation: intact.   Left pupil: Size: 3 mm. Shape: regular. Reactivity: brisk. Consensual response: intact. Accommodation: intact.   CN III: no CN III palsy  CN VI: no CN VI palsy  Nystagmus: none   Diplopia: none  Upgaze: normal  Downgaze: normal  Conjugate gaze: present  Vestibulo-ocular reflex: present    CN V   Facial sensation intact.   Right facial sensation deficit: none  Left facial sensation deficit: none  Right corneal reflex: normal  Left corneal reflex: normal    CN VII   Facial expression full, symmetric.   Right facial weakness: none  Left facial weakness: none  Right taste: normal  Left taste: normal    CN VIII   CN VIII normal.   Hearing: intact    CN IX, X   CN IX normal.   CN X normal.   Palate: symmetric    CN XI   CN XI normal.   Right sternocleidomastoid strength: normal  Left sternocleidomastoid  strength: normal  Right trapezius strength: normal  Left trapezius strength: normal    CN XII   CN XII normal.   Tongue: not atrophic  Fasciculations: absent  Tongue deviation: none    MOTOR EXAM   Muscle bulk: normal  Overall muscle tone: normal  Right arm tone: normal  Left arm tone: normal  Right arm pronator drift: absent  Left arm pronator drift: absent  Right leg tone: normal  Left leg tone: normal    Strength   Right neck flexion: 5/5  Left neck flexion: 5/5  Right neck extension: 5/5  Left neck extension: 5/5  Right deltoid: 5/5  Left deltoid: 5/5  Right biceps: 5/5  Left biceps: 5/5  Right triceps: 5/5  Left triceps: 5/5  Right wrist flexion: 5/5  Left wrist flexion: 5/5  Right wrist extension: 5/5  Left wrist extension: 5/5  Right interossei: 5/5  Left interossei: 5/5  Right iliopsoas: 5/5  Left iliopsoas: 5/5  Right quadriceps: 5/5  Left quadriceps: 5/5  Right hamstrin/5  Left hamstrin/5  Right anterior tibial: 5/5  Left anterior tibial: 5/5  Right posterior tibial: 5/5  Left posterior tibial: 5/5  Right gastroc: 5/5  Left gastroc: 5/5    REFLEXES     Reflexes   Right brachioradialis: 2+  Left brachioradialis: 2+  Right biceps: 2+  Left biceps: 2+  Right triceps: 2+  Left triceps: 2+  Right patellar: 2+  Left patellar: 2+  Right achilles: 2+  Left achilles: 2+  Right plantar: normal  Left plantar: normal  Right Morse: absent  Left Morse: absent  Right ankle clonus: absent  Left ankle clonus: absent  Right pendular knee jerk: absent  Left pendular knee jerk: absent    SENSORY EXAM   Light touch normal.   Right arm light touch: normal  Left arm light touch: normal  Right leg light touch: normal  Left leg light touch: normal  Vibration normal.   Right arm vibration: normal  Left arm vibration: normal  Right leg vibration: normal  Left leg vibration: normal  Proprioception normal.   Right arm proprioception: normal  Left arm proprioception: normal  Right leg proprioception: normal  Left leg  proprioception: normal  Pinprick normal.   Right arm pinprick: normal  Left arm pinprick: normal  Right leg pinprick: normal  Left leg pinprick: normal  Graphesthesia: normal  Romberg: negative  Stereognosis: normal    GAIT AND COORDINATION     Gait  Gait: normal     Coordination   Finger to nose coordination: normal  Heel to shin coordination: normal  Tandem walking coordination: normal    Tremor   Resting tremor: absent  Intention tremor: absent  Action tremor: absent       Physical Exam  Vitals and nursing note reviewed.   Constitutional:       Appearance: Normal appearance.   HENT:      Head: Normocephalic.   Eyes:      Extraocular Movements: Extraocular movements intact and EOM normal.      Pupils: Pupils are equal, round, and reactive to light.   Pulmonary:      Effort: Pulmonary effort is normal.   Musculoskeletal:         General: No swelling or tenderness. Normal range of motion.      Cervical back: Normal range of motion and neck supple.      Right lower leg: No edema.      Left lower leg: No edema.   Skin:     General: Skin is warm and dry.      Coloration: Skin is not jaundiced.      Findings: No rash.   Neurological:      General: No focal deficit present.      Mental Status: She is alert and oriented to person, place, and time.      GCS: GCS eye subscore is 4. GCS verbal subscore is 5. GCS motor subscore is 6.      Cranial Nerves: No cranial nerve deficit.      Sensory: No sensory deficit.      Motor: Motor function is intact. No weakness.      Coordination: Coordination is intact. Coordination normal. Finger-Nose-Finger Test, Heel to Shin Test and Romberg Test normal.      Gait: Gait is intact. Gait and tandem walk normal.      Deep Tendon Reflexes: Reflexes normal.      Reflex Scores:       Tricep reflexes are 2+ on the right side and 2+ on the left side.       Bicep reflexes are 2+ on the right side and 2+ on the left side.       Brachioradialis reflexes are 2+ on the right side and 2+ on the left  side.       Patellar reflexes are 2+ on the right side and 2+ on the left side.       Achilles reflexes are 2+ on the right side and 2+ on the left side.  Psychiatric:         Mood and Affect: Mood normal.         Speech: Speech normal.         Behavior: Behavior normal.          Assessment:     Problem List Items Addressed This Visit          Neuro    Muscle twitching    Relevant Orders    EEG    Action tremor - Primary    Relevant Orders    CBC Auto Differential    Comprehensive Metabolic Panel       Psychiatric    Anxiety (Chronic)     Other Visit Diagnoses       Primary insomnia        Vitamin B12 deficiency        Relevant Orders    Folate    Vitamin B12    Hypothyroidism, unspecified type        Relevant Orders    TSH    Vitamin D deficiency        Relevant Orders    Vitamin D             Primary Diagnosis and ICD10  Action tremor [G25.2]    Plan:     Patient Instructions   Eeg  Labs as ordered  No new medications recommended at this time pending above workup  Keep follow-up with primary care    There are no discontinued medications.    Requested Prescriptions      No prescriptions requested or ordered in this encounter       Orders Placed This Encounter   Procedures    CBC Auto Differential    Comprehensive Metabolic Panel    Folate    TSH    Vitamin B12    Vitamin D    EEG

## 2024-12-12 ENCOUNTER — TELEPHONE (OUTPATIENT)
Dept: NEUROLOGY | Facility: CLINIC | Age: 39
End: 2024-12-12
Payer: MEDICAID

## 2024-12-12 RX ORDER — ERGOCALCIFEROL 1.25 MG/1
50000 CAPSULE ORAL
Qty: 4 CAPSULE | Refills: 2 | Status: SHIPPED | OUTPATIENT
Start: 2024-12-12

## 2024-12-14 ENCOUNTER — PATIENT MESSAGE (OUTPATIENT)
Dept: NEUROLOGY | Facility: CLINIC | Age: 39
End: 2024-12-14
Payer: MEDICAID

## 2024-12-20 ENCOUNTER — PROCEDURE VISIT (OUTPATIENT)
Dept: NEUROLOGY | Facility: HOSPITAL | Age: 39
End: 2024-12-20
Attending: FAMILY MEDICINE
Payer: MEDICAID

## 2024-12-20 DIAGNOSIS — R25.3 MUSCLE TWITCHING: ICD-10-CM

## 2024-12-20 PROCEDURE — 95812 EEG 41-60 MINUTES: CPT

## 2024-12-24 ENCOUNTER — OFFICE VISIT (OUTPATIENT)
Dept: FAMILY MEDICINE | Facility: CLINIC | Age: 39
End: 2024-12-24
Payer: MEDICAID

## 2024-12-24 VITALS
HEART RATE: 102 BPM | WEIGHT: 127 LBS | HEIGHT: 61 IN | DIASTOLIC BLOOD PRESSURE: 66 MMHG | SYSTOLIC BLOOD PRESSURE: 103 MMHG | BODY MASS INDEX: 23.98 KG/M2

## 2024-12-24 DIAGNOSIS — E78.2 MIXED HYPERLIPIDEMIA: ICD-10-CM

## 2024-12-24 DIAGNOSIS — F41.9 ANXIETY: Primary | Chronic | ICD-10-CM

## 2024-12-24 PROCEDURE — 3008F BODY MASS INDEX DOCD: CPT | Mod: CPTII,,, | Performed by: FAMILY MEDICINE

## 2024-12-24 PROCEDURE — 3074F SYST BP LT 130 MM HG: CPT | Mod: CPTII,,, | Performed by: FAMILY MEDICINE

## 2024-12-24 PROCEDURE — 99214 OFFICE O/P EST MOD 30 MIN: CPT | Mod: ,,, | Performed by: FAMILY MEDICINE

## 2024-12-24 PROCEDURE — 1159F MED LIST DOCD IN RCRD: CPT | Mod: CPTII,,, | Performed by: FAMILY MEDICINE

## 2024-12-24 PROCEDURE — 3078F DIAST BP <80 MM HG: CPT | Mod: CPTII,,, | Performed by: FAMILY MEDICINE

## 2024-12-24 PROCEDURE — 3044F HG A1C LEVEL LT 7.0%: CPT | Mod: CPTII,,, | Performed by: FAMILY MEDICINE

## 2024-12-24 RX ORDER — AMITRIPTYLINE HYDROCHLORIDE 10 MG/1
10 TABLET, FILM COATED ORAL NIGHTLY PRN
Qty: 90 TABLET | Refills: 1 | Status: SHIPPED | OUTPATIENT
Start: 2024-12-24 | End: 2025-06-22

## 2024-12-24 RX ORDER — ATORVASTATIN CALCIUM 40 MG/1
40 TABLET, FILM COATED ORAL DAILY
Qty: 90 TABLET | Refills: 3 | Status: SHIPPED | OUTPATIENT
Start: 2024-12-24 | End: 2025-12-24

## 2024-12-24 NOTE — PROGRESS NOTES
"              Ezra Morrison IV, DO  [facility]  [address] [place] [address]  Phone: [phone]      Subjective     Name: Windy Alcocer   Sex: female  YOB: 1985 (39 y.o.)  MRN: [id number]  Visit Date: 12/24/2024   Chief Complaint: Follow-up        HISTORY OF PRESENT ILLNESS:    Chief Complaint   Patient presents with    Follow-up       HPI  See tests that keep you healthy and the problem oriented documentation below.    Tests to Keep You Healthy    Cervical Cancer Screening: Met on 9/17/2024  Tobacco Cessation: NO      Portions of this note may have been created with voice recognition software. Occasional "wrong-word" or "sound-a-like" substitutions may have occurred due to the inherent limitations of voice recognition software. Please, read the note carefully and recognize, using context, where substitutions have occurred.     PAST MEDICAL HISTORY:  Significant Diagnoses - Patient  has a past medical history of Anxiety and Depression.  Medications - Patient has a current medication list which includes the following long-term medication(s): amitriptyline and atorvastatin.   Allergies - Patient has No Known Allergies.  Surgeries - Patient  has a past surgical history that includes Breast surgery and Left heart catheterization (N/A, 3/19/2024).  Family History - Patient family history includes Hypertension in her father and mother.      SOCIAL HISTORY:  Tobacco - Patient  reports that she has been smoking cigarettes. She has been exposed to tobacco smoke. She has never used smokeless tobacco.   Alcohol - Patient  reports that she does not currently use alcohol.   Recreational Drugs - Patient  reports that she does not currently use drugs after having used the following drugs: Marijuana and Hydrocodone. Frequency: 2.00 times per week.       Review of Systems   All other systems reviewed and are negative.       Past Medical History:   Diagnosis Date    Anxiety     " "Depression         Review of patient's allergies indicates:  No Known Allergies     Past Surgical History:   Procedure Laterality Date    BREAST SURGERY      LEFT HEART CATHETERIZATION N/A 3/19/2024    Procedure: Left heart cath;  Surgeon: August Guajardo MD;  Location: [facility] CATH LAB;  Service: Cardiology;  Laterality: N/A;        Family History   Problem Relation Name Age of Onset    Hypertension Mother      Hypertension Father         Current Outpatient Medications   Medication Instructions    amitriptyline (ELAVIL) 10 mg, Oral, Nightly PRN    atorvastatin (LIPITOR) 40 mg, Oral, Daily    clonazePAM (KLONOPIN) 1 mg, 2 times daily PRN    ergocalciferol (ERGOCALCIFEROL) 50,000 Units, Oral, Every 7 days        Objective     /66   Pulse 102   Ht 5' 1" (1.549 m)   Wt 57.6 kg (127 lb)   LMP 12/03/2024 (Exact Date)   BMI 24.00 kg/m²     Physical Exam  Constitutional:       General: She is not in acute distress.     Appearance: Normal appearance. She is not ill-appearing.   HENT:      Head: Normocephalic and atraumatic.      Right Ear: External ear normal.      Left Ear: External ear normal.   Eyes:      Extraocular Movements: Extraocular movements intact.      Conjunctiva/sclera: Conjunctivae normal.   Cardiovascular:      Rate and Rhythm: Normal rate.      Heart sounds: No murmur heard.  Pulmonary:      Effort: Pulmonary effort is normal.   Musculoskeletal:      Cervical back: Normal range of motion.   Skin:     General: Skin is warm and dry.      Coloration: Skin is not jaundiced.      Findings: No rash.   Neurological:      Mental Status: She is alert.   Psychiatric:         Mood and Affect: Mood normal.        All recently obtained labs have been reviewed and discussed in detail with the patient.   Assessment     1. Anxiety    2. Mixed hyperlipidemia         Plan        Problem List Items Addressed This Visit       Anxiety - Primary (Chronic)     Chronic with exacerbation.  Patient continues " to have significant problems with the anxiety.  Patient has been participating in [employer] based program.  Have been advised that although patient has had some symptoms that could be related to discontinuation of clonazepam they are mostly related to patient's increased in anxiety.  Given the fact that patient is working hard to prove that this is not a substance abuse issue or diversion issue I will likely reconsider starting clonazepam back in the future.         Mixed hyperlipidemia    Relevant Medications    atorvastatin (LIPITOR) 40 MG tablet       No follow-ups on file.    Patient advised that is symptoms worsen, they should call or report directly to local emergency department.    Ezra Morrison, DO  [facility]

## 2024-12-24 NOTE — ASSESSMENT & PLAN NOTE
Chronic with exacerbation.  Patient continues to have significant problems with the anxiety.  Patient has been participating in Yazdanism based program.  Have been advised that although patient has had some symptoms that could be related to discontinuation of clonazepam they are mostly related to patient's increased in anxiety.  Given the fact that patient is working hard to prove that this is not a substance abuse issue or diversion issue I will likely reconsider starting clonazepam back in the future.

## 2024-12-30 ENCOUNTER — TELEPHONE (OUTPATIENT)
Dept: NEUROLOGY | Facility: CLINIC | Age: 39
End: 2024-12-30
Payer: MEDICAID

## 2024-12-30 NOTE — TELEPHONE ENCOUNTER
PT V/U.    ----- Message from HARDY Geiger sent at 12/30/2024  7:28 AM CST -----  EEG negative, no indication of seizures

## 2025-01-08 ENCOUNTER — PATIENT MESSAGE (OUTPATIENT)
Dept: FAMILY MEDICINE | Facility: CLINIC | Age: 40
End: 2025-01-08
Payer: MEDICAID

## 2025-01-09 ENCOUNTER — HOSPITAL ENCOUNTER (EMERGENCY)
Facility: HOSPITAL | Age: 40
Discharge: HOME | End: 2025-01-09
Payer: MEDICAID

## 2025-01-09 VITALS
DIASTOLIC BLOOD PRESSURE: 81 MMHG | WEIGHT: 118 LBS | RESPIRATION RATE: 16 BRPM | HEART RATE: 90 BPM | TEMPERATURE: 99 F | HEIGHT: 61 IN | OXYGEN SATURATION: 99 % | SYSTOLIC BLOOD PRESSURE: 133 MMHG | BODY MASS INDEX: 22.28 KG/M2

## 2025-01-09 DIAGNOSIS — J06.9 ACUTE UPPER RESPIRATORY INFECTION: Primary | ICD-10-CM

## 2025-01-09 DIAGNOSIS — F41.9 ANXIETY: ICD-10-CM

## 2025-01-09 LAB
INFLUENZA A MOLECULAR (OHS): NEGATIVE
INFLUENZA B MOLECULAR (OHS): NEGATIVE
SARS-COV-2 RDRP RESP QL NAA+PROBE: NEGATIVE

## 2025-01-09 PROCEDURE — 99282 EMERGENCY DEPT VISIT SF MDM: CPT

## 2025-01-09 PROCEDURE — 99284 EMERGENCY DEPT VISIT MOD MDM: CPT | Mod: ,,, | Performed by: NURSE PRACTITIONER

## 2025-01-09 PROCEDURE — 87635 SARS-COV-2 COVID-19 AMP PRB: CPT | Performed by: NURSE PRACTITIONER

## 2025-01-09 PROCEDURE — 87502 INFLUENZA DNA AMP PROBE: CPT | Performed by: NURSE PRACTITIONER

## 2025-01-10 NOTE — ED PROVIDER NOTES
Encounter Date: 1/9/2025       History     Chief Complaint   Patient presents with    Chest Pain     Upper mid chest pain radiating to left shoulder. Pt states she has been under increased stress related to excess people living in her house and her son is in more legal trouble. Out of anxiety meds.  Onset of CP 3 days ago.      Presented with c/o sinus congestion, drng, body aches that started earlier today. States symptoms started after crying for a long period of time. She reports is under stress with regarding problems with her teenage son. She notes that she has anxiety and is no longer on any medications for it and does not wish to restart meds. States has been trying to learn to deal with stressors herself. She notes she reads the Bible usually and it helps her. She denies suicidal or homicidal ideations. She denies cough or SOB but notes that she did have chest tightness earlier when she was upset that has since subsided. She denies  known contact with person with COVID or flu.      Review of patient's allergies indicates:  No Known Allergies  Past Medical History:   Diagnosis Date    Anxiety     Depression      Past Surgical History:   Procedure Laterality Date    BREAST SURGERY      LEFT HEART CATHETERIZATION N/A 3/19/2024    Procedure: Left heart cath;  Surgeon: August Guajardo MD;  Location: [facility] CATH LAB;  Service: Cardiology;  Laterality: N/A;     Family History   Problem Relation Name Age of Onset    Hypertension Mother      Hypertension Father       Social History     Tobacco Use    Smoking status: Every Day     Current packs/day: 0.50     Types: Cigarettes     Passive exposure: Current    Smokeless tobacco: Never   Substance Use Topics    Alcohol use: Not Currently     Comment: occasionally    Drug use: Not Currently     Frequency: 2.0 times per week     Types: Marijuana, Hydrocodone     Comment: last smoked 4 days ago     Review of Systems   Constitutional:  Positive for appetite change and  fatigue. Negative for activity change and fever.   HENT:  Positive for congestion and sinus pressure. Negative for sore throat.    Respiratory:  Positive for chest tightness. Negative for cough and shortness of breath.    Cardiovascular:  Negative for chest pain and palpitations.   Gastrointestinal:  Negative for abdominal pain, diarrhea, nausea and vomiting.   Genitourinary: Negative.    Musculoskeletal: Negative.    Neurological: Negative.  Negative for headaches.   Psychiatric/Behavioral: Negative.  Negative for suicidal ideas.        Physical Exam     Initial Vitals [01/09/25 1823]   BP Pulse Resp Temp SpO2   133/81 90 16 98.7 °F (37.1 °C) 99 %      MAP       --         Physical Exam    Nursing note and vitals reviewed.  Constitutional: She appears well-developed and well-nourished. No distress.   HENT:   Head: Normocephalic.   Right Ear: External ear normal.   Left Ear: External ear normal.   Nose: Mucosal edema present. No rhinorrhea. Mouth/Throat: Oropharynx is clear and moist. No oropharyngeal exudate, posterior oropharyngeal edema or posterior oropharyngeal erythema.   Eyes: Conjunctivae and EOM are normal. Pupils are equal, round, and reactive to light.   Neck: Neck supple.   Normal range of motion.  Cardiovascular:  Normal rate, regular rhythm, normal heart sounds and intact distal pulses.           No murmur heard.  Pulmonary/Chest: Breath sounds normal. She has no wheezes. She has no rhonchi. She has no rales.   Abdominal: Abdomen is soft. Bowel sounds are normal.   Musculoskeletal:         General: No edema. Normal range of motion.      Cervical back: Normal range of motion and neck supple.     Lymphadenopathy:     She has no cervical adenopathy.   Neurological: She is alert and oriented to person, place, and time. She has normal strength. GCS score is 15. GCS eye subscore is 4. GCS verbal subscore is 5. GCS motor subscore is 6.   Skin: Skin is warm and dry. Capillary refill takes less than 2 seconds.    Psychiatric: She has a normal mood and affect.         Medical Screening Exam   See Full Note    ED Course   Procedures  Labs Reviewed   INFLUENZA A & B BY MOLECULAR - Normal       Result Value    INFLUENZA A MOLECULAR Negative      INFLUENZA B MOLECULAR  Negative     SARS-COV-2 RNA AMPLIFICATION, QUAL - Normal    SARS COV-2 Molecular Negative      Narrative:     Negative SARS-CoV results should not be used as the sole basis for treatment or patient management decisions; negative results should be considered in the context of a patient's recent exposures, history and the presene of clinical signs and symptoms consistent with COVID-19.  Negative results should be treated as presumptive and confirmed by molecular assay, if necessary for patient management.          Imaging Results    None          Medications - No data to display  Medical Decision Making  Presented with c/o sinus congestion, drng, body aches that started earlier today. States symptoms started after crying for a long period of time. She reports is under stress with regarding problems with her teenage son. She notes that she has anxiety and is no longer on any medications for it and does not wish to restart meds. States has been trying to learn to deal with stressors herself. She notes she reads the Bible usually and it helps her. She denies suicidal or homicidal ideations. She denies cough or SOB but notes that she did have chest tightness earlier when she was upset that has since subsided. She denies  known contact with person with COVID or flu.    Amount and/or Complexity of Data Reviewed  Labs: ordered.     Details: COVID and flu are negative.    Risk  Risk Details: Pt is stable. She is calm and cooperative. No crying while in ED. Discussed assessment and diagnostics with pt. Instructed pt on use of OTC for Uri symptoms. Urged to follow-up with [place]. Instructed pt on benefits of therapy and counseling to help learn ways to deal with her anxiety  and stressors. Pt is afebrile. Hr 90, RR 16, /81, o2 sat 99% on RA.  Instructed on home care, OTC med use, follow-up and return precautions. Discharged home with detailed written instructions provided.                                        Clinical Impression:   Final diagnoses:  [J06.9] Acute upper respiratory infection (Primary)  [F41.9] Anxiety        ED Disposition Condition    Discharge Stable          ED Prescriptions    None       Follow-up Information       Follow up With Specialties Details Why Contact Info    [facility]   If symptoms worsen [address] [place], [place]  [phone]    [facility] - Emergency Department Emergency Medicine   [address] [address] [phone]             Marcela Madrid NP  01/09/25 1954

## 2025-01-10 NOTE — DISCHARGE INSTRUCTIONS
Take Mucinex 600-1200 mg twice a day for sinus congestion and drainage. Use Flonase nasal spray 1 spray to each nostril twice a day. Take Tylenol or Ibuprofen as needed for pain. Follow-up with your PCP if symptoms persist in 1 week.     To help with your anxiety, contact [facility] [place].

## 2025-01-13 ENCOUNTER — PATIENT OUTREACH (OUTPATIENT)
Dept: EMERGENCY MEDICINE | Facility: HOSPITAL | Age: 40
End: 2025-01-13
Payer: MEDICAID

## 2025-01-13 NOTE — PROGRESS NOTES
Natividad Trujillo LPN  ED Navigator  Emergency Department    Project: [facility] ED Navigator  Role: Community Health Worker    Date: 01/13/2025  Patient Name: Windy Alcocer  MRN: [id number]  PCP: Ezra Morrison IV, DO    Assessment:     Windy Alcocer is a 39 y.o. female who has presented to ED for 1/9/25. Patient has visited the ED 2 times in the past 3 months. Patient did contact PCP.     ED Navigator Initial Assessment    ED Navigator Enrollment Documentation  Consent to Services  Does patient consent to completing the assessment?: Yes  Contact  Method of Initial Contact: Phone  Transportation  Does the patient have issues with Transportation?: No  Does the patient have transportation to and from healthcare appointments?: Yes  Insurance Coverage  Do you have coverage/adequate coverage?: Yes  Type/kind of coverage: Medicaid  Is patient able to afford co-pays/deductibles?: Yes  Is patient able to afford HME or supplies?: Yes  Does patient have an established [facility] PCP?: Yes  Able to access?: Yes  Does the patient have a lack of adequate coverage?: No  Specialist Appointment  Did the patient come to the ED to see a specialist?: No  Does the patient have a pending specialist referral?: No  Does the patient have a specialist appointment made?: No  PCP Follow Up Appointment  Has the patient had an appointment with a primary care provider in the past year?: Yes  Approximate date: 12/24/24  Provider: Ezra Morrison IV, DO  Does the patient have a follow up appontment with a PCP?: No  When was the last time you saw your PCP?: 12/24/24  Why does the patient not have a follow up scheduled?: Other (see comments) (Comment: Patient stated is going through a lot family wise unable to make appointment with pcp)  Medications  Is patient able to afford medication?: Yes  Is patient unable to get medication due to lack of transportation?: No  Psychological  Does the patient have psycho-social concerns?: Yes  What concerns does the patient  have?: Anxiety and/or Depression, Changes in relationships/Role in family (Comment: Patient stated has a 15 year old son that has made some tough decisions in life and will not be able to get out of and is having a hard time dealing with sons issues)  Food  Does the patient have concerns about food?: No  Communication/Education  Does the patient have limited English proficiency/English not primary language?: No  Does patient have low literacy and/or low health literacy?: No  Does patient have concerns with care?: No  Does patient have dissatisfaction with care?: No  Other Financial Concerns  Does the patient have immediate financial distress?: No  Does the patient have general financial concerns?: No  Other Social Barriers/Concerns  Does the patient have any additional barriers or concerns?: Other (see comments)  Primary Barrier  Barriers identified: Psychological barrier (mistrust, anxiety, etc.), Structural barrier (service availability, waiting times, etc.)  Root Cause of ED Utilization: Chronic Conditions  Plan to address Chronic Conditions: Encourage patient to contact PCP/specialist for follow up per ED discharge instructions  Next steps: Provided Education  Was education/educational materials provided surrounding PCP services/creating a medical home?: Yes Was education verbal or written?: Verbal     Was education/educational materials provided surrounding low cost, healthy foods?: No      Was education/educational materials provided surrounding other items? If so, use comment to explain.: No    Plan: Provided information for [facility]  Nurse triage line, [phone] or [phone] ([phone])  Expected Date of Follow Up 1: 2/3/25  Additional Documentation: ED Navigator verified patient spelling first and last name with  Patient verified. ED navigator educated about the program patient agreed. Patient stated My chest pain and sinus issues are coming from me being under a lot of stress  anxiety and depression from my 15 year old [place] and has gotten into some trouble that he can't get out of. I have 7 other kids that I have to take care and its sad to say but when he gets here I have to turn him in to the police but I have a praying family and I trust God to get me through this so all I need now is prayers. ED navigator assist patient with my contact information at [phone] and the on-call nurse line [phone] if any assistance is needed. Ed Navigator instructed patient to make an appointment with pcp to help with coping and to seek help as needed for the matter that is taking place. Patient stated that she needed to get back on her medication for anxiety because she is going to need something to help her through this. ED navigator verbalized resources for the patient to get help and that the ed navigator would be reaching out to follow up with patient in a couple of weeks.Patient stated understanding to the education and information that was given         Social History     Socioeconomic History    Marital status: Single   Tobacco Use    Smoking status: Every Day     Current packs/day: 0.50     Types: Cigarettes     Passive exposure: Current    Smokeless tobacco: Never   Substance and Sexual Activity    Alcohol use: Not Currently     Comment: occasionally    Drug use: Not Currently     Frequency: 2.0 times per week     Types: Marijuana, Hydrocodone     Comment: last smoked 4 days ago    Sexual activity: Yes     Partners: Male     Social Drivers of Health     Financial Resource Strain: Low Risk  (1/13/2025)    Overall Financial Resource Strain (CARDIA)     Difficulty of Paying Living Expenses: Not hard at all   Food Insecurity: No Food Insecurity (1/13/2025)    Hunger Vital Sign     Worried About Running Out of Food in the Last Year: Never true     Ran Out of Food in the Last Year: Never true   Transportation Needs: No Transportation Needs (1/13/2025)    PRAPARE -  Transportation     Lack of Transportation (Medical): No     Lack of Transportation (Non-Medical): No   Physical Activity: Patient Declined (1/13/2025)    Exercise Vital Sign     Days of Exercise per Week: Patient declined     Minutes of Exercise per Session: Patient declined   Stress: Stress Concern Present (1/13/2025)    [employer] - Occupational Stress Questionnaire     Feeling of Stress : Very much   Housing Stability: Low Risk  (1/13/2025)    Housing Stability Vital Sign     Unable to Pay for Housing in the Last Year: No     Homeless in the Last Year: No       Plan:   ED navigator encouraged patient to reach out to pcp to get an appointment to get the help that is needed. When ed navigator asked to assist the patient with making the appointment or reaching out to a counselor patient declined offer and stated would make the appointment on her own. Ed navigator informed patient that a follow up call would be done to the patient and patient stated understanding and agreed      Appointment made with: Ezra Morrison IV, DO no appointment on file at this moment

## 2025-01-14 ENCOUNTER — PATIENT MESSAGE (OUTPATIENT)
Dept: FAMILY MEDICINE | Facility: CLINIC | Age: 40
End: 2025-01-14
Payer: MEDICAID

## 2025-01-14 ENCOUNTER — OFFICE VISIT (OUTPATIENT)
Dept: FAMILY MEDICINE | Facility: CLINIC | Age: 40
End: 2025-01-14
Payer: MEDICAID

## 2025-01-14 VITALS
SYSTOLIC BLOOD PRESSURE: 93 MMHG | HEART RATE: 139 BPM | DIASTOLIC BLOOD PRESSURE: 56 MMHG | WEIGHT: 120 LBS | HEIGHT: 61 IN | BODY MASS INDEX: 22.66 KG/M2

## 2025-01-14 DIAGNOSIS — Z79.899 HIGH RISK MEDICATION USE: Primary | ICD-10-CM

## 2025-01-14 DIAGNOSIS — F41.9 ANXIETY: ICD-10-CM

## 2025-01-14 LAB
CTP QC/QA: YES
POC (AMP) AMPHETAMINE: NEGATIVE
POC (BAR) BARBITURATES: NEGATIVE
POC (BUP) BUPRENORPHINE: NEGATIVE
POC (BZO) BENZODIAZEPINES: NEGATIVE
POC (COC) COCAINE: NEGATIVE
POC (MDMA) METHYLENEDIOXYMETHAMPHETAMINE 3,4: NEGATIVE
POC (MET) METHAMPHETAMINE: NEGATIVE
POC (MOP) OPIATES: NEGATIVE
POC (MTD) METHADONE: NEGATIVE
POC (OXY) OXYCODONE: NEGATIVE
POC (PCP) PHENCYCLIDINE: NEGATIVE
POC (TCA) TRICYCLIC ANTIDEPRESSANTS: NEGATIVE
POC TEMPERATURE (URINE): 90
POC THC: NEGATIVE

## 2025-01-14 PROCEDURE — 1159F MED LIST DOCD IN RCRD: CPT | Mod: CPTII,,, | Performed by: FAMILY MEDICINE

## 2025-01-14 PROCEDURE — 3074F SYST BP LT 130 MM HG: CPT | Mod: CPTII,,, | Performed by: FAMILY MEDICINE

## 2025-01-14 PROCEDURE — 80305 DRUG TEST PRSMV DIR OPT OBS: CPT | Mod: RHCUB | Performed by: FAMILY MEDICINE

## 2025-01-14 PROCEDURE — 99213 OFFICE O/P EST LOW 20 MIN: CPT | Mod: ,,, | Performed by: FAMILY MEDICINE

## 2025-01-14 PROCEDURE — 3008F BODY MASS INDEX DOCD: CPT | Mod: CPTII,,, | Performed by: FAMILY MEDICINE

## 2025-01-14 PROCEDURE — 3078F DIAST BP <80 MM HG: CPT | Mod: CPTII,,, | Performed by: FAMILY MEDICINE

## 2025-01-14 RX ORDER — CLONAZEPAM 1 MG/1
1 TABLET ORAL DAILY
Qty: 30 TABLET | Refills: 0 | Status: SHIPPED | OUTPATIENT
Start: 2025-01-14 | End: 2025-02-13

## 2025-01-14 NOTE — ASSESSMENT & PLAN NOTE
Chronic with with exacerbation.  Patient has had some life events lately that have worsened this problem.  I have been working with the patient without medication for some time now and she has made great efforts to make sure that there was no diversion concerns present.  UDS today is negative for any substances.  I am going to recommend restarting clonazepam at 1 mg p.o. q.d.

## 2025-01-14 NOTE — LETTER
January 14, 2025      [facility]  [address] [place] [address]  Phone: [phone]  Fax: [phone]       Patient: Windy Alcocer   YOB: 1985  Date of Visit: 01/14/2025    To Whom It May Concern:    Mena Alcocer  was at [facility] on 01/14/2025. The patient may return to work/school on [phone] with no restrictions. If you have any questions or concerns, or if I can be of further assistance, please do not hesitate to contact me.    Sincerely,    Malou Conde LPN

## 2025-01-15 NOTE — PROGRESS NOTES
"              Ezra Morrison IV, DO  [facility]  [address] [place] [address]  Phone: [phone]      Subjective     Name: Winyd Alcocer   Sex: female  YOB: 1985 (39 y.o.)  MRN: [id number]  Visit Date: 1/14/25   Chief Complaint: Consult        HISTORY OF PRESENT ILLNESS:    Chief Complaint   Patient presents with    Consult       HPI        Review of Systems   All other systems reviewed and are negative.          SOCIAL HISTORY:  Tobacco - Patient  reports that she has been smoking cigarettes. She has been exposed to tobacco smoke. She has never used smokeless tobacco.   Alcohol - Patient  reports that she does not currently use alcohol.   Recreational Drugs - Patient  reports that she does not currently use drugs after having used the following drugs: Marijuana and Hydrocodone. Frequency: 2.00 times per week.         See tests that keep you healthy and the problem oriented documentation below.    Tests to Keep You Healthy    Cervical Cancer Screening: Met on 9/17/2024  Tobacco Cessation: NO      Portions of this note may have been created with voice recognition software. Occasional "wrong-word" or "sound-a-like" substitutions may have occurred due to the inherent limitations of voice recognition software. Please, read the note carefully and recognize, using context, where substitutions have occurred.          Past Medical History:   Diagnosis Date    Anxiety     Depression         Review of patient's allergies indicates:  No Known Allergies     Past Surgical History:   Procedure Laterality Date    BREAST SURGERY      LEFT HEART CATHETERIZATION N/A 3/19/2024    Procedure: Left heart cath;  Surgeon: August Guajardo MD;  Location: [facility] CATH LAB;  Service: Cardiology;  Laterality: N/A;        Family History   Problem Relation Name Age of Onset    Hypertension Mother      Hypertension Father         Current Outpatient Medications   Medication Instructions    " "amitriptyline (ELAVIL) 10 mg, Oral, Nightly PRN    atorvastatin (LIPITOR) 40 mg, Oral, Daily    clonazePAM (KLONOPIN) 1 mg, Oral, Daily    ergocalciferol (ERGOCALCIFEROL) 50,000 Units, Oral, Every 7 days        Objective     BP (!) 93/56   Pulse (!) 139   Ht 5' 1" (1.549 m)   Wt 54.4 kg (120 lb)   LMP 01/07/2025 (Exact Date)   BMI 22.67 kg/m²     Physical Exam  Constitutional:       General: She is not in acute distress.     Appearance: Normal appearance. She is not ill-appearing.   HENT:      Head: Normocephalic and atraumatic.      Right Ear: External ear normal.      Left Ear: External ear normal.   Eyes:      Extraocular Movements: Extraocular movements intact.      Conjunctiva/sclera: Conjunctivae normal.   Cardiovascular:      Rate and Rhythm: Normal rate.      Heart sounds: No murmur heard.  Pulmonary:      Effort: Pulmonary effort is normal.   Musculoskeletal:      Cervical back: Normal range of motion.   Skin:     General: Skin is warm and dry.      Coloration: Skin is not jaundiced.      Findings: No rash.   Neurological:      Mental Status: She is alert.   Psychiatric:         Mood and Affect: Mood normal.          All recently obtained labs have been reviewed and discussed in detail with the patient.   Assessment     1. High risk medication use    2. Anxiety         Plan        Problem List Items Addressed This Visit       Anxiety (Chronic)     Chronic with with exacerbation.  Patient has had some life events lately that have worsened this problem.  I have been working with the patient without medication for some time now and she has made great efforts to make sure that there was no diversion concerns present.  UDS today is negative for any substances.  I am going to recommend restarting clonazepam at 1 mg p.o. q.d.          Other Visit Diagnoses       High risk medication use    -  Primary    Relevant Medications    clonazePAM (KLONOPIN) 1 MG tablet    Other Relevant Orders    POCT Urine Drug " Screen Presump (Completed)            No follow-ups on file.    Patient advised that is symptoms worsen, they should call or report directly to local emergency department.    Ezra Morrison,   [facility]

## 2025-01-20 ENCOUNTER — PATIENT MESSAGE (OUTPATIENT)
Dept: NEUROLOGY | Facility: CLINIC | Age: 40
End: 2025-01-20
Payer: MEDICAID

## 2025-01-27 ENCOUNTER — PATIENT MESSAGE (OUTPATIENT)
Dept: CARDIOLOGY | Facility: CLINIC | Age: 40
End: 2025-01-27
Payer: MEDICAID

## 2025-01-27 ENCOUNTER — OFFICE VISIT (OUTPATIENT)
Dept: OBSTETRICS AND GYNECOLOGY | Facility: CLINIC | Age: 40
End: 2025-01-27
Payer: MEDICAID

## 2025-01-27 VITALS
DIASTOLIC BLOOD PRESSURE: 61 MMHG | WEIGHT: 124 LBS | SYSTOLIC BLOOD PRESSURE: 99 MMHG | BODY MASS INDEX: 23.41 KG/M2 | HEART RATE: 81 BPM | HEIGHT: 61 IN

## 2025-01-27 DIAGNOSIS — N64.4 BREAST TENDERNESS: ICD-10-CM

## 2025-01-27 DIAGNOSIS — R30.0 DYSURIA: ICD-10-CM

## 2025-01-27 DIAGNOSIS — Z30.9 ENCOUNTER FOR CONTRACEPTIVE MANAGEMENT, UNSPECIFIED TYPE: ICD-10-CM

## 2025-01-27 DIAGNOSIS — Z32.02 URINE PREGNANCY TEST NEGATIVE: ICD-10-CM

## 2025-01-27 DIAGNOSIS — N63.10 MASS OF RIGHT BREAST, UNSPECIFIED QUADRANT: ICD-10-CM

## 2025-01-27 DIAGNOSIS — Z72.51 UNPROTECTED SEXUAL INTERCOURSE: ICD-10-CM

## 2025-01-27 DIAGNOSIS — R82.90 ABNORMAL URINE: ICD-10-CM

## 2025-01-27 DIAGNOSIS — N89.8 VAGINAL ODOR: Primary | ICD-10-CM

## 2025-01-27 LAB
B-HCG UR QL: NEGATIVE
BACTERIAL VAGINOSIS DNA (OHS): POSITIVE
BILIRUB SERPL-MCNC: ABNORMAL MG/DL
BLOOD URINE, POC: ABNORMAL
CANDIDA GLABRATA/KRUSEI DNA (OHS): NOT DETECTED
CANDIDA SPECIES DNA (OHS): NOT DETECTED
CLARITY, UA: CLEAR
COLOR, UA: YELLOW
CTP QC/QA: YES
GLUCOSE UR QL STRIP: ABNORMAL
KETONES UR QL STRIP: ABNORMAL
LEUKOCYTE ESTERASE URINE, POC: ABNORMAL
NITRITE, POC UA: ABNORMAL
OHS QRS DURATION: 82 MS
OHS QTC CALCULATION: 442 MS
PH, POC UA: 5.5
PROTEIN, POC: ABNORMAL
SPECIFIC GRAVITY, POC UA: 1.03
TRICHOMONAS VAGINALIS DNA (OHS): DETECTED
UROBILINOGEN, POC UA: 0.2

## 2025-01-27 PROCEDURE — 3078F DIAST BP <80 MM HG: CPT | Mod: CPTII,,, | Performed by: ADVANCED PRACTICE MIDWIFE

## 2025-01-27 PROCEDURE — 81515 NFCT DS BV&VAGINITIS DNA ALG: CPT | Mod: ,,, | Performed by: CLINICAL MEDICAL LABORATORY

## 2025-01-27 PROCEDURE — 3074F SYST BP LT 130 MM HG: CPT | Mod: CPTII,,, | Performed by: ADVANCED PRACTICE MIDWIFE

## 2025-01-27 PROCEDURE — 81025 URINE PREGNANCY TEST: CPT | Mod: RHCUB | Performed by: ADVANCED PRACTICE MIDWIFE

## 2025-01-27 PROCEDURE — 1159F MED LIST DOCD IN RCRD: CPT | Mod: CPTII,,, | Performed by: ADVANCED PRACTICE MIDWIFE

## 2025-01-27 PROCEDURE — 99213 OFFICE O/P EST LOW 20 MIN: CPT | Mod: ,,, | Performed by: ADVANCED PRACTICE MIDWIFE

## 2025-01-27 PROCEDURE — 3008F BODY MASS INDEX DOCD: CPT | Mod: CPTII,,, | Performed by: ADVANCED PRACTICE MIDWIFE

## 2025-01-27 RX ORDER — NITROFURANTOIN 25; 75 MG/1; MG/1
100 CAPSULE ORAL 2 TIMES DAILY
Qty: 10 CAPSULE | Refills: 0 | Status: SHIPPED | OUTPATIENT
Start: 2025-01-27 | End: 2025-02-01

## 2025-01-27 NOTE — PROGRESS NOTES
"Patient ID:  Windy Alcocer is a 39 y.o. female      Chief Complaint:   Chief Complaint   Patient presents with    Vaginitis     Smell started after period went off. Sexually Active.     Abdominal Pain     Breast tender        HPI:  April is c/o a vaginal odor that is fishy since her LMP.  She says her breasts are tender and she has a lump in the right breast where she previously had an abscess.  She has some pain with urination.  She is having unprotected sex.  We discussed BTL or Paraguard to prevent pregnancy.  LMP: Patient's last menstrual period was 2025 (exact date).   Sexually active:  yes  Contraceptive: none      Past Medical History:   Diagnosis Date    Anxiety     Depression      Past Surgical History:   Procedure Laterality Date    BREAST SURGERY      LEFT HEART CATHETERIZATION N/A 3/19/2024    Procedure: Left heart cath;  Surgeon: August Guajardo MD;  Location: [facility] CATH LAB;  Service: Cardiology;  Laterality: N/A;       OB History          12    Para   10    Term   8            AB   2    Living   8         SAB        IAB        Ectopic        Multiple        Live Births   2                 BP 99/61   Pulse 81   Ht 5' 1" (1.549 m)   Wt 56.2 kg (124 lb)   LMP 2025 (Exact Date)   BMI 23.43 kg/m²   Wt Readings from Last 3 Encounters:   25 56.2 kg (124 lb)   25 54.4 kg (120 lb)   25 53.5 kg (118 lb)          ROS:  Review of Systems   Constitutional: Negative.    HENT: Negative.     Respiratory: Negative.     Cardiovascular: Negative.    Gastrointestinal: Negative.    Endocrine: Negative.    Genitourinary:  Positive for vaginal discharge. Negative for frequency.   Integumentary:  Positive for breast mass. Negative.   Breast: Positive for mass.         PHYSICAL EXAM:  Physical Exam     Assessment:  April was seen today for vaginitis and abdominal pain.    Diagnoses and all orders for this visit:    Vaginal odor  -     Vaginosis Screen by DNA Probe; " Future  -     Vaginosis Screen by DNA Probe    Unprotected sexual intercourse  -     POCT urine pregnancy    Dysuria  -     POCT URINALYSIS W/O SCOPE    Mass of right breast, unspecified quadrant  -     Mammo Digital Diagnostic Bilat; Future    Breast tenderness    Abnormal urine  -     nitrofurantoin, macrocrystal-monohydrate, (MACROBID) 100 MG capsule; Take 1 capsule (100 mg total) by mouth 2 (two) times daily. for 5 days    Encounter for contraceptive management, unspecified type  -     Ambulatory referral/consult to Obstetrics / Gynecology; Future    Urine pregnancy test negative          ICD-10-CM ICD-9-CM    1. Vaginal odor  N89.8 625.8 Vaginosis Screen by DNA Probe      Vaginosis Screen by DNA Probe      2. Unprotected sexual intercourse  Z72.51 V69.2 POCT urine pregnancy      3. Dysuria  R30.0 788.1 POCT URINALYSIS W/O SCOPE      4. Mass of right breast, unspecified quadrant  N63.10 611.72 Mammo Digital Diagnostic Bilat      5. Breast tenderness  N64.4 611.71       6. Abnormal urine  R82.90 791.9 nitrofurantoin, macrocrystal-monohydrate, (MACROBID) 100 MG capsule      7. Encounter for contraceptive management, unspecified type  Z30.9 V25.9 Ambulatory referral/consult to Obstetrics / Gynecology      8. Urine pregnancy test negative  Z32.02 V72.41           Plan: Referral to Dr. Oro for BTL  Use a condom to prevent pregnancy  Schedule a mammogram to assess right breast mass  Call result of testing.      Follow up referred to Dr. Oro for BTL evaluation.

## 2025-01-28 ENCOUNTER — PATIENT MESSAGE (OUTPATIENT)
Dept: OBSTETRICS AND GYNECOLOGY | Facility: CLINIC | Age: 40
End: 2025-01-28
Payer: MEDICAID

## 2025-01-28 DIAGNOSIS — B96.89 BACTERIAL VAGINOSIS: ICD-10-CM

## 2025-01-28 DIAGNOSIS — A59.9 TRICHOMONIASIS: Primary | ICD-10-CM

## 2025-01-28 DIAGNOSIS — N76.0 BACTERIAL VAGINOSIS: ICD-10-CM

## 2025-01-28 RX ORDER — METRONIDAZOLE 500 MG/1
500 TABLET ORAL 2 TIMES DAILY
Qty: 14 TABLET | Refills: 0 | Status: SHIPPED | OUTPATIENT
Start: 2025-01-28 | End: 2025-02-04

## 2025-01-29 ENCOUNTER — HOSPITAL ENCOUNTER (OUTPATIENT)
Dept: RADIOLOGY | Facility: HOSPITAL | Age: 40
Discharge: HOME | End: 2025-01-29
Attending: ADVANCED PRACTICE MIDWIFE
Payer: MEDICAID

## 2025-01-29 DIAGNOSIS — N63.0 BREAST LUMP: ICD-10-CM

## 2025-01-29 DIAGNOSIS — N63.10 MASS OF RIGHT BREAST, UNSPECIFIED QUADRANT: ICD-10-CM

## 2025-01-29 PROCEDURE — 77062 BREAST TOMOSYNTHESIS BI: CPT | Mod: TC

## 2025-01-29 PROCEDURE — 76641 ULTRASOUND BREAST COMPLETE: CPT | Mod: 26,50,, | Performed by: RADIOLOGY

## 2025-01-29 PROCEDURE — 77066 DX MAMMO INCL CAD BI: CPT | Mod: 26,,, | Performed by: RADIOLOGY

## 2025-01-29 PROCEDURE — 76641 ULTRASOUND BREAST COMPLETE: CPT | Mod: TC,50

## 2025-01-29 PROCEDURE — 77062 BREAST TOMOSYNTHESIS BI: CPT | Mod: 26,,, | Performed by: RADIOLOGY

## 2025-01-30 ENCOUNTER — PATIENT MESSAGE (OUTPATIENT)
Facility: CLINIC | Age: 40
End: 2025-01-30
Payer: MEDICAID

## 2025-02-02 ENCOUNTER — HOSPITAL ENCOUNTER (EMERGENCY)
Facility: HOSPITAL | Age: 40
Discharge: HOME | End: 2025-02-02
Payer: MEDICAID

## 2025-02-02 VITALS
OXYGEN SATURATION: 98 % | SYSTOLIC BLOOD PRESSURE: 114 MMHG | RESPIRATION RATE: 18 BRPM | HEART RATE: 82 BPM | DIASTOLIC BLOOD PRESSURE: 82 MMHG | HEIGHT: 62 IN | TEMPERATURE: 98 F | WEIGHT: 122.38 LBS | BODY MASS INDEX: 22.52 KG/M2

## 2025-02-02 DIAGNOSIS — F41.9 ANXIETY: ICD-10-CM

## 2025-02-02 DIAGNOSIS — R07.9 CHEST PAIN: Primary | ICD-10-CM

## 2025-02-02 LAB
ALBUMIN SERPL BCP-MCNC: 3.9 G/DL (ref 3.5–5)
ALBUMIN/GLOB SERPL: 1.5 {RATIO}
ALP SERPL-CCNC: 56 U/L (ref 40–150)
ALT SERPL W P-5'-P-CCNC: 8 U/L
AMPHET UR QL SCN: NEGATIVE
ANION GAP SERPL CALCULATED.3IONS-SCNC: 11 MMOL/L (ref 7–16)
ANISOCYTOSIS BLD QL SMEAR: ABNORMAL
AST SERPL W P-5'-P-CCNC: 15 U/L (ref 5–34)
BACTERIA #/AREA URNS HPF: ABNORMAL /HPF
BARBITURATES UR QL SCN: NEGATIVE
BASOPHILS # BLD AUTO: 0.01 K/UL (ref 0–0.2)
BASOPHILS NFR BLD AUTO: 0.2 % (ref 0–1)
BENZODIAZ METAB UR QL SCN: NEGATIVE
BILIRUB SERPL-MCNC: 0.3 MG/DL
BILIRUB UR QL STRIP: NEGATIVE
BUN SERPL-MCNC: 10 MG/DL (ref 7–19)
BUN/CREAT SERPL: 15 (ref 6–20)
CALCIUM SERPL-MCNC: 8.6 MG/DL (ref 8.4–10.2)
CANNABINOIDS UR QL SCN: NEGATIVE
CHLORIDE SERPL-SCNC: 108 MMOL/L (ref 98–107)
CLARITY UR: ABNORMAL
CO2 SERPL-SCNC: 25 MMOL/L (ref 22–29)
COCAINE UR QL SCN: NEGATIVE
COLOR UR: YELLOW
CREAT SERPL-MCNC: 0.68 MG/DL (ref 0.55–1.02)
DIFFERENTIAL METHOD BLD: ABNORMAL
EGFR (NO RACE VARIABLE) (RUSH/TITUS): 114 ML/MIN/1.73M2
EOSINOPHIL # BLD AUTO: 0.14 K/UL (ref 0–0.5)
EOSINOPHIL NFR BLD AUTO: 3.1 % (ref 1–4)
EOSINOPHIL NFR BLD MANUAL: 3 % (ref 1–4)
ERYTHROCYTE [DISTWIDTH] IN BLOOD BY AUTOMATED COUNT: 14.3 % (ref 11.5–14.5)
GLOBULIN SER-MCNC: 2.6 G/DL (ref 2–4)
GLUCOSE SERPL-MCNC: 94 MG/DL (ref 74–100)
GLUCOSE UR STRIP-MCNC: NEGATIVE MG/DL
HCT VFR BLD AUTO: 35.2 % (ref 38–47)
HGB BLD-MCNC: 11 G/DL (ref 12–16)
IMM GRANULOCYTES # BLD AUTO: 0.01 K/UL (ref 0–0.04)
IMM GRANULOCYTES NFR BLD: 0.2 % (ref 0–0.4)
INFLUENZA A MOLECULAR (OHS): NEGATIVE
INFLUENZA B MOLECULAR (OHS): NEGATIVE
KETONES UR STRIP-SCNC: NEGATIVE MG/DL
LEUKOCYTE ESTERASE UR QL STRIP: ABNORMAL
LYMPHOCYTES # BLD AUTO: 2.83 K/UL (ref 1–4.8)
LYMPHOCYTES NFR BLD AUTO: 61.8 % (ref 27–41)
LYMPHOCYTES NFR BLD MANUAL: 69 % (ref 27–41)
MCH RBC QN AUTO: 22.4 PG (ref 27–31)
MCHC RBC AUTO-ENTMCNC: 31.3 G/DL (ref 32–36)
MCV RBC AUTO: 71.5 FL (ref 80–96)
MICROCYTES BLD QL SMEAR: ABNORMAL
MONOCYTES # BLD AUTO: 0.56 K/UL (ref 0–0.8)
MONOCYTES NFR BLD AUTO: 12.2 % (ref 2–6)
MONOCYTES NFR BLD MANUAL: 2 % (ref 2–6)
MPC BLD CALC-MCNC: ABNORMAL G/DL
NEUTROPHILS # BLD AUTO: 1.03 K/UL (ref 1.8–7.7)
NEUTROPHILS NFR BLD AUTO: 22.5 % (ref 53–65)
NEUTS SEG NFR BLD MANUAL: 26 % (ref 50–62)
NITRITE UR QL STRIP: NEGATIVE
NRBC # BLD AUTO: 0 X10E3/UL
NRBC, AUTO (.00): 0 %
OPIATES UR QL SCN: POSITIVE
PCP UR QL SCN: NEGATIVE
PH UR STRIP: 7 PH UNITS
PLATELET # BLD AUTO: 184 K/UL (ref 150–400)
PLATELET MORPHOLOGY: NORMAL
POTASSIUM SERPL-SCNC: 4.7 MMOL/L (ref 3.5–5.1)
PROT SERPL-MCNC: 6.5 G/DL (ref 6.4–8.3)
PROT UR QL STRIP: NEGATIVE
RBC # BLD AUTO: 4.92 M/UL (ref 4.2–5.4)
RBC # UR STRIP: ABNORMAL /UL
RBC #/AREA URNS HPF: ABNORMAL /HPF
SODIUM SERPL-SCNC: 139 MMOL/L (ref 136–145)
SP GR UR STRIP: 1.02
SQUAMOUS #/AREA URNS LPF: ABNORMAL /LPF
TROPONIN I SERPL HS-MCNC: <2.7 NG/L
UROBILINOGEN UR STRIP-ACNC: 0.2 MG/DL
WBC # BLD AUTO: 4.58 K/UL (ref 4.5–11)
WBC #/AREA URNS HPF: ABNORMAL /HPF

## 2025-02-02 PROCEDURE — 36415 COLL VENOUS BLD VENIPUNCTURE: CPT | Performed by: NURSE PRACTITIONER

## 2025-02-02 PROCEDURE — 85025 COMPLETE CBC W/AUTO DIFF WBC: CPT | Performed by: NURSE PRACTITIONER

## 2025-02-02 PROCEDURE — 80307 DRUG TEST PRSMV CHEM ANLYZR: CPT | Performed by: NURSE PRACTITIONER

## 2025-02-02 PROCEDURE — 93010 ELECTROCARDIOGRAM REPORT: CPT | Mod: ,,, | Performed by: HOSPITALIST

## 2025-02-02 PROCEDURE — 81003 URINALYSIS AUTO W/O SCOPE: CPT | Performed by: NURSE PRACTITIONER

## 2025-02-02 PROCEDURE — 80053 COMPREHEN METABOLIC PANEL: CPT | Performed by: NURSE PRACTITIONER

## 2025-02-02 PROCEDURE — 99284 EMERGENCY DEPT VISIT MOD MDM: CPT | Mod: 25

## 2025-02-02 PROCEDURE — 93005 ELECTROCARDIOGRAM TRACING: CPT

## 2025-02-02 PROCEDURE — 99284 EMERGENCY DEPT VISIT MOD MDM: CPT | Mod: ,,, | Performed by: NURSE PRACTITIONER

## 2025-02-02 PROCEDURE — 84484 ASSAY OF TROPONIN QUANT: CPT | Performed by: NURSE PRACTITIONER

## 2025-02-02 PROCEDURE — 87502 INFLUENZA DNA AMP PROBE: CPT | Performed by: NURSE PRACTITIONER

## 2025-02-03 LAB
OHS QRS DURATION: 80 MS
OHS QTC CALCULATION: 430 MS

## 2025-02-03 RX ORDER — METOPROLOL SUCCINATE 25 MG/1
25 TABLET, EXTENDED RELEASE ORAL DAILY
Qty: 90 TABLET | Refills: 1 | Status: SHIPPED | OUTPATIENT
Start: 2025-02-03 | End: 2025-02-28

## 2025-02-03 NOTE — ED PROVIDER NOTES
"Encounter Date: 2/2/2025       History     Chief Complaint   Patient presents with    Palpitations     Presented with c/o intermittent sharp shooting chest pain with episodes lasting 5-10 min that started 2 days ago. Denies diaphoresis, nausea, vomiting, back pain, or abd pain. Pt has history of anxiety. States PCP started her back on Klonipin qday. States has been under stress due to problems with her 15 yo son. States that she went to the PD last week to have the PD keep her son and "send him off for some help", but instead she was arrested while there for some unpaid fines and her son was sent back home. Denies fever or chills, cough or congestion. States general malaise.      Review of patient's allergies indicates:  No Known Allergies  Past Medical History:   Diagnosis Date    Anxiety     Depression      Past Surgical History:   Procedure Laterality Date    BREAST SURGERY      LEFT HEART CATHETERIZATION N/A 03/19/2024    Procedure: Left heart cath;  Surgeon: August Guajardo MD;  Location: [facility] CATH LAB;  Service: Cardiology;  Laterality: N/A;     Family History   Problem Relation Name Age of Onset    Hypertension Mother      Hypertension Father      Stroke Sister      Breast cancer Maternal Grandmother       Social History     Tobacco Use    Smoking status: Every Day     Current packs/day: 0.50     Types: Cigarettes     Passive exposure: Current    Smokeless tobacco: Never   Substance Use Topics    Alcohol use: Not Currently     Comment: occasionally    Drug use: Not Currently     Frequency: 2.0 times per week     Types: Marijuana, Hydrocodone     Comment: last smoked 4 days ago     Review of Systems    Physical Exam     Initial Vitals [02/02/25 1944]   BP Pulse Resp Temp SpO2   118/76 87 18 98.3 °F (36.8 °C) 100 %      MAP       --         Physical Exam    Medical Screening Exam   See Full Note    ED Course   Procedures  Labs Reviewed   COMPREHENSIVE METABOLIC PANEL - Abnormal       Result Value    " Sodium 139      Potassium 4.7      Chloride 108 (*)     CO2 25      Anion Gap 11      Glucose 94      BUN 10      Creatinine 0.68      BUN/Creatinine Ratio 15      Calcium 8.6      Total Protein 6.5      Albumin 3.9      Globulin 2.6      A/G Ratio 1.5      Bilirubin, Total 0.3      Alk Phos 56      ALT 8      AST 15      eGFR 114     URINALYSIS, REFLEX TO URINE CULTURE - Abnormal    Color, UA Yellow      Clarity, UA Slightly Cloudy      pH, UA 7.0      Leukocytes, UA Trace (*)     Nitrites, UA Negative      Protein, UA Negative      Glucose, UA Negative      Ketones, UA Negative      Urobilinogen, UA 0.2      Bilirubin, UA Negative      Blood, UA Trace-Lysed (*)     Specific [place], UA 1.020     DRUG SCREEN, URINE (BEAKER) - Abnormal    Barbiturates, Urine Negative      Benzodiazepine, Urine Negative      Opiates, Urine Positive (*)     Phencyclidine, Urine Negative      Amphetamine, Urine Negative      Cannabinoid, Urine Negative      Cocaine, Urine Negative      Narrative:     This screen includes the following classes of drugs at the listed cut-off:    Benzodiazepines 200 ng/ml  Cocaine metabolite 300 ng/ml  Opiates 2000 ng/ml  Barbiturates 200 ng/ml  Amphetamines 500 ng/ml  Marijuana metabs (THC) 50 ng/ml  Phencyclidine (PCP) 25 ng/ml    This is a screening test. If results do not correlate with clinical presentation, then a confirmatory send out test is advised.   CBC WITH DIFFERENTIAL - Abnormal    WBC 4.58      RBC 4.92      Hemoglobin 11.0 (*)     Hematocrit 35.2 (*)     MCV 71.5 (*)     MCH 22.4 (*)     MCHC 31.3 (*)     RDW 14.3      Platelet Count 184      MPV        Neutrophils % 22.5 (*)     Lymphocytes % 61.8 (*)     Monocytes % 12.2 (*)     Eosinophils % 3.1      Basophils % 0.2      Immature Granulocytes % 0.2      nRBC, Auto 0.0      Neutrophils, Abs 1.03 (*)     Lymphocytes, Absolute 2.83      Monocytes, Absolute 0.56      Eosinophils, Absolute 0.14      Basophils, Absolute 0.01      Immature  "Granulocytes, Absolute 0.01      nRBC, Absolute 0.00      Diff Type Scan Smear     URINALYSIS, MICROSCOPIC - Abnormal    WBC, UA 0-5      RBC, UA 0-3      Bacteria, UA Few (*)     Squamous Epithelial Cells, UA Moderate (*)    MANUAL DIFFERENTIAL - Abnormal    Segmented Neutrophils, Man % 26 (*)     Lymphocytes, Man % 69 (*)     Monocytes, Man % 2      Eosinophils, Man % 3      Platelet Morphology Normal      Anisocytosis 1+      Microcytosis 1+     INFLUENZA A & B BY MOLECULAR - Normal    INFLUENZA A MOLECULAR Negative      INFLUENZA B MOLECULAR  Negative     TROPONIN I - Normal    Troponin I High Sensitivity <2.7     CBC W/ AUTO DIFFERENTIAL    Narrative:     The following orders were created for panel order CBC auto differential.  Procedure                               Abnormality         Status                     ---------                               -----------         ------                     CBC with Differential[[id number]]       Abnormal            Edited Result - FINAL      Manual Differential[[id number]]         Abnormal            Final result                 Please view results for these tests on the individual orders.     EKG Readings: (Independently Interpreted)   Initial Reading: No STEMI. Previous EKG: Compared with most recent EKG Previous EKG Date: 1/9/25. Rhythm: Normal Sinus Rhythm. Heart Rate: 86.   Reviewed at 1952. No change from previous.       Imaging Results    None          Medications - No data to display  Medical Decision Making  Presented with c/o intermittent sharp shooting chest pain with episodes lasting 5-10 min that started 2 days ago. Denies diaphoresis, nausea, vomiting, back pain, or abd pain. Pt has history of anxiety. States PCP started her back on Klonipin qday. States has been under stress due to problems with her 15 yo son. States that she went to the PD last week to have the PD keep her son and "send him off for some help", but instead she was arrested while there " for some unpaid fines and her son was sent back home. Denies fever or chills, cough or congestion. States general malaise.    Amount and/or Complexity of Data Reviewed  Labs: ordered. Decision-making details documented in ED Course.     Details: Troponin < 2.7, WBC 4580 with H&H 11 and 35.2, plt 612238, Na 139, K+ 4.7, BUN 10, creatinine 0.68, Alt 8, AST 15, UDS pos for opioids. Flu neg.  ECG/medicine tests: ordered. Decision-making details documented in ED Course.     Details: EKG shows NSR rate 86.    Risk  Risk Details: Pt is stable. Discussed assessment and diagnostic findings with pt.  Instructed on home care, med use, follow-up and return precautions. Discharged home with detailed written instructions provided.                 ED Course as of 02/02/25 2219   Sun Feb 02, 2025 2039 Troponin I High Sensitivity: <2.7 [DS]   2040 WBC: 4.58 [DS]   2040 Hemoglobin(!): 11.0 [DS]   2040 Hematocrit(!): 35.2 [DS]   2040 Platelet Count: 184 [DS]   2040 Sodium: 139 [DS]   2040 Potassium: 4.7 [DS]   2040 BUN: 10 [DS]   2040 Creatinine: 0.68 [DS]   2040 ALT: 8 [DS]   2040 AST: 15 [DS]   2040 Opiates, Urine(!): Positive [DS]   2041 Influenza A, Molecular: Negative [DS]   2041 Influenza B, Molecular: Negative [DS]      ED Course User Index  [DS] Marcela Madrid NP                           Clinical Impression:   Final diagnoses:  [R07.9] Chest pain (Primary)  [F41.9] Anxiety        ED Disposition Condition    Discharge Stable          ED Prescriptions    None       Follow-up Information       Follow up With Specialties Details Why Contact Info    Ezra Morrison IV, DO Family Medicine In 1 day  [address] [facility] [place] [address] [phone]               Marcela Madrid NP  02/02/25 2219

## 2025-02-05 ENCOUNTER — OFFICE VISIT (OUTPATIENT)
Dept: CARDIOLOGY | Facility: CLINIC | Age: 40
End: 2025-02-05
Payer: MEDICAID

## 2025-02-05 VITALS
HEIGHT: 62 IN | SYSTOLIC BLOOD PRESSURE: 108 MMHG | DIASTOLIC BLOOD PRESSURE: 72 MMHG | BODY MASS INDEX: 22.08 KG/M2 | WEIGHT: 120 LBS | HEART RATE: 114 BPM | OXYGEN SATURATION: 99 %

## 2025-02-05 DIAGNOSIS — F17.200 TOBACCO DEPENDENCE: ICD-10-CM

## 2025-02-05 DIAGNOSIS — I50.32 CHRONIC DIASTOLIC HEART FAILURE: ICD-10-CM

## 2025-02-05 DIAGNOSIS — I25.10 NONOCCLUSIVE CORONARY ATHEROSCLEROSIS OF NATIVE CORONARY ARTERY: ICD-10-CM

## 2025-02-05 DIAGNOSIS — F43.9 REACTION TO SEVERE STRESS: Primary | ICD-10-CM

## 2025-02-05 PROBLEM — I20.89 ATYPICAL ANGINA: Status: RESOLVED | Noted: 2024-03-11 | Resolved: 2025-02-05

## 2025-02-05 PROBLEM — I50.30: Status: RESOLVED | Noted: 2024-03-11 | Resolved: 2025-02-05

## 2025-02-05 PROBLEM — R00.2 PALPITATIONS: Status: RESOLVED | Noted: 2024-01-09 | Resolved: 2025-02-05

## 2025-02-05 PROCEDURE — 99214 OFFICE O/P EST MOD 30 MIN: CPT | Mod: PBBFAC | Performed by: HOSPITALIST

## 2025-02-05 PROCEDURE — 99999 PR PBB SHADOW E&M-EST. PATIENT-LVL IV: CPT | Mod: PBBFAC,,, | Performed by: HOSPITALIST

## 2025-02-05 PROCEDURE — 3078F DIAST BP <80 MM HG: CPT | Mod: CPTII,,, | Performed by: HOSPITALIST

## 2025-02-05 PROCEDURE — 1159F MED LIST DOCD IN RCRD: CPT | Mod: CPTII,,, | Performed by: HOSPITALIST

## 2025-02-05 PROCEDURE — 99215 OFFICE O/P EST HI 40 MIN: CPT | Mod: S$PBB,,, | Performed by: HOSPITALIST

## 2025-02-05 PROCEDURE — 3074F SYST BP LT 130 MM HG: CPT | Mod: CPTII,,, | Performed by: HOSPITALIST

## 2025-02-05 PROCEDURE — 3008F BODY MASS INDEX DOCD: CPT | Mod: CPTII,,, | Performed by: HOSPITALIST

## 2025-02-05 RX ORDER — CLONAZEPAM 1 MG/1
1 TABLET ORAL 2 TIMES DAILY
Qty: 60 TABLET | Refills: 0 | Status: SHIPPED | OUTPATIENT
Start: 2025-02-05 | End: 2026-02-05

## 2025-02-05 NOTE — PROGRESS NOTES
CARDIOVASCULAR CONSULTATION        REASON FOR CONSULT:   Windy Alcocer is a 39 y.o. female who presents for   Chief Complaint   Patient presents with    Chest Pain     Last week and today, sharp pain. Localized mid sternum left side of ribcage. Duration: 2 to 3 minutes.    Palpitations     Patient states that she had them for 2 days straight before she went to the ER.           HISTORY OF PRESENT ILLNESS:   39 y.o. female who  has a past medical history of Anxiety and Depression.    Today we discussed the patient's cardiac symptoms at length. We discussed smoking cessation again today. She feels like she's going to quit this year. The patient has been to the ER several times for what she believes is stress related as she was off her klonopin. She attributes her chest pain to stress (she has 8 children, one of whom has been arrested multiple times, including after calling the police himself to prove that they wouldn't arrest a minor- he is currently facing felony charges for stealing a car). Most recently, she was called after he tried to steal another car and she was told the owner had a firearm and was trying to find the patient's son. I listened at length as she explained the extent of the stress in her life (which does sound extremely taxing). We have prior discussed that extreme stress  She has a primary care visit in March but does not have any more klonopin - she asked if I would prescribe her klonopin until she see Dr. Morrison. I agreed this was reasonable, but explained I could only prescribe her enough until she sees her PCP.         PAST MEDICAL HISTORY:     Past Medical History:   Diagnosis Date    Anxiety     Depression        PAST SURGICAL HISTORY:     Past Surgical History:   Procedure Laterality Date    BREAST SURGERY      LEFT HEART CATHETERIZATION N/A 03/19/2024    Procedure: Left heart cath;  Surgeon: August Guajardo MD;  Location: [facility] CATH LAB;  Service: Cardiology;  Laterality: N/A;        ALLERGIES AND MEDICATION:   Review of patient's allergies indicates:  No Known Allergies     Medication List            Accurate as of February 5, 2025 10:53 AM. If you have any questions, ask your nurse or doctor.                CHANGE how you take these medications      clonazePAM 1 MG tablet  Commonly known as: KlonoPIN  Take 1 tablet (1 mg total) by mouth 2 (two) times daily.  What changed: when to take this  Changed by: August Guajardo MD            CONTINUE taking these medications      amitriptyline 10 MG tablet  Commonly known as: ELAVIL  Take 1 tablet (10 mg total) by mouth nightly as needed for Insomnia.     ergocalciferol 50,000 unit Cap  Commonly known as: ERGOCALCIFEROL  Take 1 capsule (50,000 Units total) by mouth every 7 days.     metoprolol succinate 25 MG 24 hr tablet  Commonly known as: TOPROL-XL  Take 1 tablet (25 mg total) by mouth once daily.               Where to Get Your Medications        These medications were sent to [facility]  [address] [place] [id number]      Hours: Mon-Fri, 8:30a-5:00p Phone: [phone]   clonazePAM 1 MG tablet         SOCIAL HISTORY:     Social History     Socioeconomic History    Marital status: Single   Tobacco Use    Smoking status: Every Day     Current packs/day: 0.50     Types: Cigarettes     Passive exposure: Current    Smokeless tobacco: Never   Substance and Sexual Activity    Alcohol use: Not Currently     Comment: occasionally    Drug use: Not Currently     Frequency: 2.0 times per week     Types: Marijuana, Hydrocodone     Comment: last smoked 4 days ago    Sexual activity: Yes     Partners: Male     Social Drivers of Health     Financial Resource Strain: Low Risk  (1/13/2025)    Overall Financial Resource Strain (CARDIA)     Difficulty of Paying Living Expenses: Not hard at all   Food Insecurity: No Food Insecurity (1/13/2025)    Hunger Vital Sign     Worried About Running Out of Food in the Last Year: Never true      "Ran Out of Food in the Last Year: Never true   Transportation Needs: No Transportation Needs (1/13/2025)    PRAPARE - Transportation     Lack of Transportation (Medical): No     Lack of Transportation (Non-Medical): No   Physical Activity: Patient Declined (1/13/2025)    Exercise Vital Sign     Days of Exercise per Week: Patient declined     Minutes of Exercise per Session: Patient declined   Stress: Stress Concern Present (1/13/2025)    [employer] [employer] - Occupational Stress Questionnaire     Feeling of Stress : Very much   Housing Stability: Low Risk  (1/13/2025)    Housing Stability Vital Sign     Unable to Pay for Housing in the Last Year: No     Homeless in the Last Year: No       FAMILY HISTORY:     Family History   Problem Relation Name Age of Onset    Hypertension Mother      Hypertension Father      Stroke Sister      Breast cancer Maternal Grandmother         REVIEW OF SYSTEMS:       Cardiology focused 12-point ROS otherwise unremarkable except for as mentioned in HPI and A/P.   Pertinent Positives and Negatives documented herein.       PHYSICAL EXAM:     Vitals:    02/05/25 1007   BP: 108/72   Pulse: (!) 114    Body mass index is 21.95 kg/m².  Weight: 54.4 kg (120 lb)   Height: 5' 2" (157.5 cm)     Gen: NAD  HEENT: NC/AT, MMM  Chest: normal wob  CV: RRR, no m/g/r  Ext: mild/trace edema of BLEs  Skin: no rash  Psych: AMAA  Neuro: CNGI      DATA:     Laboratory:  CBC:  Recent Labs   Lab 11/19/24  1513 12/11/24  1440 02/02/25 2008   WBC 5.59 5.20 4.58   Hemoglobin 11.1 L 12.1 11.0 L   Hematocrit 36.1 L 40.2 35.2 L   Platelet Count 233 170 184       CHEMISTRIES:  Recent Labs   Lab 01/20/23  2142 09/11/23  0749 10/24/23  1548 06/07/24  1105 06/09/24  2147 10/31/24  2056 12/11/24  1440 02/02/25 2008   Glucose 97 96   < > 86   < > 85 94 94   Sodium 144 137   < > 137   < > 139 139 139   Potassium 3.9 3.5   < > 4.1   < > 4.2 4.2 4.7   BUN 16 13   < > 13   < > 19 H 16 10   Creatinine 0.72 " "0.64   < > 0.63   < > 0.57 0.73 0.68   Calcium 8.3 L 9.0   < > 8.2 L   < > 8.4 L 8.7 8.6   Magnesium 2.1 2.1  --  2.2  --   --   --   --     < > = values in this interval not displayed.       CARDIAC BIOMARKERS:      No results found for: "BNP"    COAGS:  Recent Labs   Lab 05/17/24  1713   INR 1.01       LIPIDS/LFTS:  Recent Labs   Lab 03/26/24  1455 06/07/24  1105 09/09/24  0910 11/07/24  1144 12/11/24  1440 02/02/25 2008   Cholesterol 165  --   --  154  --   --    Triglycerides 68  --   --  45  --   --    HDL Cholesterol 50  --   --  57  --   --    LDL Calculated 101  --   --  88  --   --    Non-  --   --  97  --   --    AST  --    < > 10 L  --  17 15   ALT  --    < > 13  --  15 8    < > = values in this interval not displayed.       Hemoglobin A1C   Date Value Ref Range Status   11/07/2024 5.1 4.5 - 6.6 % Final     Comment:       Normal:               <5.7%  Pre-Diabetic:       5.7% to 6.4%  Diabetic:             >6.4%  Diabetic Goal:     <7%       TSH  Recent Labs   Lab 02/06/24  1017 08/07/24  1549 12/11/24  1440   TSH 0.603 1.090 1.950       The ASCVD Risk score (Wesley DK, et al., 2019) failed to calculate for the following reasons:    The 2019 ASCVD risk score is only valid for ages 40 to 79     IMAGING  No orders to display       ASSESSMENT AND PLAN     Patient Active Problem List   Diagnosis    Anxiety    Reactive depression    Encounter to discuss test results    Microcytic anemia    Tobacco abuse counseling    Bruising    Lymphadenopathy    Thrombocytopenia    Neck pain    Tobacco dependence    Acute left-sided low back pain with left-sided sciatica    Neuropathy    Left arm pain    Left arm swelling    Nonocclusive coronary atherosclerosis of native coronary artery    Muscle twitching    Action tremor    Mixed hyperlipidemia    Reaction to severe stress         No orders of the defined types were placed in this encounter.      1. Reaction to severe stress  - clonazePAM (KLONOPIN) 1 MG tablet; " Take 1 tablet (1 mg total) by mouth 2 (two) times daily.  Dispense: 60 tablet; Refill: 0    2. Nonocclusive coronary atherosclerosis of native coronary artery    3. Tobacco dependence    4. Chronic diastolic heart failure      Problem Noted   Reaction to Severe Stress 2/5/2025    Patient described extreme stress due to life events that sound quite valid. The patient is having panic-attack like episodes, though she does not seem to have panic disorder. She is plausibly at risk for stress cardiomyopathy, feel it is reasonable to prescribe a short term (as in 3-6 months, then wean off) course of her klonopin. Discussed with patient that she needs to talk to her PCP about this - and that though I'm willing to prescribe her klonopin until she can see her PCP, Dr. Morrison ultimately should decide and manage her treatment. Patient understands, is amenable.      Tobacco Dependence 8/30/2024    Continues to smoke - per her own account due to stress     Tobacco Abuse Counseling 3/26/2024   Palpitations (Resolved) 1/9/2024    Update 7/29- we discussed ziopatch again given increased palpitation frequency and severity, prior, planned to place 14 day monitor if palpitations became more symptomatic, which they have  -14 day monitor to r/o underlying arrhythmia, establish pac/pvc burden, svt burden, afib/flutter etc     Pain in Joint of Left Shoulder (Resolved) 3/27/2024    Trial of diclofenac gel     Atypical Angina (Resolved) 3/11/2024   Chronic Diastolic Heart Failure (Resolved) 3/11/2024    EF normal. Diastolic dysfunction has resolved.     Results for orders placed during the hospital encounter of 05/28/24    Echo    Interpretation Summary    Left Ventricle: The left ventricle is normal in size. Normal wall thickness. There is normal systolic function. Grade I diastolic dysfunction.    Right Ventricle: Normal right ventricular cavity size. Systolic function is borderline low.    Aortic Valve: The aortic valve is a trileaflet  valve.    Pulmonic Valve: There is mild regurgitation.    Pulmonary Artery: The estimated pulmonary artery systolic pressure is 15 mmHg.    IVC/SVC: Normal venous pressure at 3 mmHg.       Acc/Aha Stage B Diastolic Heart Failure (Resolved) 3/11/2024    HFmEF - EF 40-45%   -palpitations w/ exertion, but now symptoms consistent w/ ACC/AHA stage B   -encouraged exercise, smoking cessation  -discussed narcotics - patient understands she cannot quit cold turkey, but we discussed taper- advised would be best if taper til off bc chronic narcotic therapy carries risk of multiple comorbid d/o and increased mortality                  This note was dictated with the help of speech recognition software.  There might be un-intended errors and/or substitutions.

## 2025-02-07 ENCOUNTER — HOSPITAL ENCOUNTER (EMERGENCY)
Facility: HOSPITAL | Age: 40
Discharge: HOME | End: 2025-02-07
Payer: MEDICAID

## 2025-02-07 ENCOUNTER — PATIENT MESSAGE (OUTPATIENT)
Dept: NEUROLOGY | Facility: CLINIC | Age: 40
End: 2025-02-07
Payer: MEDICAID

## 2025-02-07 VITALS
RESPIRATION RATE: 18 BRPM | WEIGHT: 123.63 LBS | TEMPERATURE: 99 F | HEIGHT: 62 IN | DIASTOLIC BLOOD PRESSURE: 64 MMHG | HEART RATE: 88 BPM | SYSTOLIC BLOOD PRESSURE: 98 MMHG | BODY MASS INDEX: 22.75 KG/M2 | OXYGEN SATURATION: 98 %

## 2025-02-07 DIAGNOSIS — J06.9 UPPER RESPIRATORY TRACT INFECTION, UNSPECIFIED TYPE: Primary | ICD-10-CM

## 2025-02-07 DIAGNOSIS — J02.9 SORE THROAT: ICD-10-CM

## 2025-02-07 DIAGNOSIS — M79.10 MYALGIA: ICD-10-CM

## 2025-02-07 DIAGNOSIS — R05.1 ACUTE COUGH: ICD-10-CM

## 2025-02-07 PROCEDURE — 99283 EMERGENCY DEPT VISIT LOW MDM: CPT

## 2025-02-07 PROCEDURE — 99284 EMERGENCY DEPT VISIT MOD MDM: CPT | Mod: ,,,

## 2025-02-07 RX ORDER — CEFDINIR 300 MG/1
300 CAPSULE ORAL 2 TIMES DAILY
Qty: 20 CAPSULE | Refills: 0 | Status: SHIPPED | OUTPATIENT
Start: 2025-02-07 | End: 2025-02-17

## 2025-02-07 NOTE — DISCHARGE INSTRUCTIONS
Take antibiotics as prescribed.  Complete all antibiotics even if you feel better.  Alternate Tylenol and ibuprofen over-the-counter for pain and fever control.  Over-the-counter antihistamine of your choice such as Zyrtec as directed.  Over-the-counter Flonase as directed.  Coricidin HBP for cough and congestion.  Increase fluids to maintain proper hydration.  Obtain plenty of rest.  Follow up with the primary care provider in 1-2 days for a recheck.  Return to the emergency department for any new or worrisome symptoms.

## 2025-02-07 NOTE — Clinical Note
"April "April" Leodan was seen and treated in our emergency department on 2/7/2025.  She may return to work on 02/10/2025.       If you have any questions or concerns, please don't hesitate to call.      Dano Millard, FNP"

## 2025-02-07 NOTE — ED PROVIDER NOTES
Encounter Date: 2/7/2025       History     Chief Complaint   Patient presents with    Cough     X 3 days     39-year-old female presents to the ED with complaints of headache, myalgias, cough, and congestion over the past week that has not improving with over-the-counter measures.  She denies any shortness of breath, difficulty breathing, vomiting, diarrhea, palpitations, or any other complaints at this time.  Blood pressure is 98/64, temperature 98.5°, heart rate 88, respirations 18, and oxygen saturation 98% on room air.  Speaking in full sentences and able to provide an adequate history.  She appears in no immediate distress.    The history is provided by the patient.     Review of patient's allergies indicates:  No Known Allergies  Past Medical History:   Diagnosis Date    Anxiety     Depression      Past Surgical History:   Procedure Laterality Date    BREAST SURGERY      LEFT HEART CATHETERIZATION N/A 03/19/2024    Procedure: Left heart cath;  Surgeon: August Guajardo MD;  Location: [facility] CATH LAB;  Service: Cardiology;  Laterality: N/A;     Family History   Problem Relation Name Age of Onset    Hypertension Mother      Hypertension Father      Stroke Sister      Breast cancer Maternal Grandmother       Social History     Tobacco Use    Smoking status: Every Day     Current packs/day: 0.50     Types: Cigarettes     Passive exposure: Current    Smokeless tobacco: Never   Substance Use Topics    Alcohol use: Not Currently     Comment: occasionally    Drug use: Not Currently     Frequency: 2.0 times per week     Types: Marijuana, Hydrocodone     Comment: last smoked 4 days ago     Review of Systems   Constitutional:  Positive for fever. Negative for appetite change and chills.   HENT:  Positive for congestion and sore throat. Negative for trouble swallowing.    Eyes: Negative.    Respiratory:  Positive for cough. Negative for shortness of breath, wheezing and stridor.    Cardiovascular:  Negative for chest  pain and palpitations.   Gastrointestinal:  Negative for abdominal pain, diarrhea, nausea and vomiting.   Endocrine: Negative.    Genitourinary:  Negative for dysuria and frequency.   Musculoskeletal:  Negative for arthralgias, back pain, neck pain and neck stiffness.   Skin:  Negative for color change, pallor and rash.   Allergic/Immunologic: Negative.    Neurological:  Positive for headaches. Negative for weakness.   Hematological:  Does not bruise/bleed easily.   Psychiatric/Behavioral:  Negative for confusion. The patient is not nervous/anxious.    All other systems reviewed and are negative.      Physical Exam     Initial Vitals [02/07/25 0955]   BP Pulse Resp Temp SpO2   98/64 88 18 98.5 °F (36.9 °C) 98 %      MAP       --         Physical Exam    Nursing note and vitals reviewed.  Constitutional: Vital signs are normal. She appears well-developed and well-nourished. She is not diaphoretic. She is active and cooperative.  Non-toxic appearance. She appears ill. No distress.   HENT:   Head: Normocephalic and atraumatic.   Right Ear: External ear and ear canal normal. Tympanic membrane is erythematous.   Left Ear: External ear and ear canal normal. Tympanic membrane is erythematous.   Nose: Rhinorrhea present. Mouth/Throat: Uvula is midline and mucous membranes are normal. Posterior oropharyngeal erythema present. No oropharyngeal exudate.   Eyes: Conjunctivae and EOM are normal. Pupils are equal, round, and reactive to light.   Neck: Neck supple.   Normal range of motion.  Cardiovascular:  Normal rate, regular rhythm and normal heart sounds.           Pulmonary/Chest: Breath sounds normal. No respiratory distress. She has no wheezes. She has no rhonchi. She has no rales. She exhibits no tenderness.   Abdominal: Abdomen is soft. Bowel sounds are normal. She exhibits no distension. There is no abdominal tenderness. There is no rebound and no guarding.   Musculoskeletal:         General: Normal range of motion.       Cervical back: Normal range of motion and neck supple.     Neurological: She is alert and oriented to person, place, and time. She has normal strength. GCS score is 15. GCS eye subscore is 4. GCS verbal subscore is 5. GCS motor subscore is 6.   Skin: Skin is warm and dry. Capillary refill takes less than 2 seconds.   Psychiatric: She has a normal mood and affect. Her behavior is normal. Judgment and thought content normal.         Medical Screening Exam   See Full Note    ED Course   Procedures  Labs Reviewed - No data to display       Imaging Results    None          Medications - No data to display  Medical Decision Making  Presents for evaluation of URI symptoms over the past week that are not improving.  Alert and oriented x4.  GCS 15.  Vital signs stable.  Currently afebrile with a temperature 98.5°.  Tolerating p.o. without any vomiting or diarrhea.  Speaking in full sentences and able to provide an adequate history.  She is nontoxic in appearance.  Breath sounds are equal and clear bilaterally to auscultation.  Mild erythema noted in bilateral TMs and posterior pharynx.  No exudate.  Sensory symptoms have been present for 1 week without any significant improvement we do suspect possible bacterial component and have recommended an outpatient course of Omnicef.  We also recommended routine URI treatment and we will provide written detailed instructions as well.  Very strict ED return precautions explained in detail.  All questions answered.  She verbalizes understanding and is agreement with this plan.    Amount and/or Complexity of Data Reviewed  Independent Historian:      Details: 39-year-old female presents to the ED with complaints of headache, myalgias, cough, and congestion over the past week that has not improving with over-the-counter measures.  She denies any shortness of breath, difficulty breathing, vomiting, diarrhea, palpitations, or any other complaints at this time.  Blood pressure is 98/64,  temperature 98.5°, heart rate 88, respirations 18, and oxygen saturation 98% on room air.  Speaking in full sentences and able to provide an adequate history.  She appears in no immediate distress.    Risk  Prescription drug management.  Risk Details: The presentation of Windy Alcocer is consistent with upper respiratory infection.    Upon discharge, Windy Alcocer has no evidence of respiratory failure and is comfortable without respiratory distress. Additionally, Windy Alcocer has no evidence of airway compromise and is speaking in full/complete sentences without difficulty. Windy Alcocer meets outpatient treatment criteria.    Data Reviewed/Counseling: I have reviewed the patient's vital signs, nursing notes, and other relevant ancillary testing/information. I have had a detailed discussion with the patient regarding the historical points, examination findings, and any diagnostic results. I have also discussed the need for outpatient follow-up. I have recommended symptomatic therapy with over the counter remedies. Symptomatic therapy suggested: acetaminophen, ibuprofen, Zyrtec, Flonase, Coricidin HBP as well as antibiotics as prescribed. Increase fluids, use vaporizer, stay in steamy bathroom tid 15 min prn severe cough, Tylenol/Motrin as needed, rest, avoid smoky areas. Follow up with PCP in 1-2 days for a recheck    Windy Alcocer has been counseled to return to the Emergency Department if symptoms worsen or if there are any questions or concerns that arise while at home.    Windy Alcocer was encouraged to practice good infection control procedures to include but not limited to frequent hand washing to lessen likelihood of transmission of this infection.   Final diagnoses:  [J06.9] Upper respiratory tract infection, unspecified type (Primary)  [R05.1] Acute cough  [J02.9] Sore throat  [M79.10] Myalgia                                      Clinical Impression:   Final diagnoses:  [J06.9] Upper respiratory tract infection,  unspecified type (Primary)  [R05.1] Acute cough  [J02.9] Sore throat  [M79.10] Myalgia        ED Disposition Condition    Discharge Stable          ED Prescriptions       Medication Sig Dispense Start Date End Date Auth. Provider    cefdinir (OMNICEF) 300 MG capsule Take 1 capsule (300 mg total) by mouth 2 (two) times daily. for 10 days 20 capsule 2/7/2025 2/17/2025 Dano Millard FNP          Follow-up Information       Follow up With Specialties Details Why Contact Info    Ezra Morrison IV, DO Family Medicine Schedule an appointment as soon as possible for a visit in 2 days  [address] [facility] [place] [address]  [phone]               Dano Millard FNP  02/07/25 1016

## 2025-02-10 DIAGNOSIS — E53.8 VITAMIN B12 DEFICIENCY: Primary | ICD-10-CM

## 2025-02-10 RX ORDER — CYANOCOBALAMIN 1000 UG/ML
1000 INJECTION, SOLUTION INTRAMUSCULAR; SUBCUTANEOUS
Qty: 3 ML | Refills: 3 | Status: SHIPPED | OUTPATIENT
Start: 2025-02-10

## 2025-02-12 ENCOUNTER — PATIENT MESSAGE (OUTPATIENT)
Dept: NEUROLOGY | Facility: CLINIC | Age: 40
End: 2025-02-12
Payer: MEDICAID

## 2025-02-13 ENCOUNTER — HOSPITAL ENCOUNTER (EMERGENCY)
Facility: HOSPITAL | Age: 40
Discharge: HOME | End: 2025-02-13
Payer: MEDICAID

## 2025-02-13 VITALS
HEART RATE: 71 BPM | BODY MASS INDEX: 22.07 KG/M2 | SYSTOLIC BLOOD PRESSURE: 114 MMHG | OXYGEN SATURATION: 98 % | TEMPERATURE: 99 F | WEIGHT: 119.94 LBS | RESPIRATION RATE: 20 BRPM | DIASTOLIC BLOOD PRESSURE: 78 MMHG | HEIGHT: 62 IN

## 2025-02-13 DIAGNOSIS — M54.9 BACK PAIN, UNSPECIFIED BACK LOCATION, UNSPECIFIED BACK PAIN LATERALITY, UNSPECIFIED CHRONICITY: Primary | ICD-10-CM

## 2025-02-13 DIAGNOSIS — R07.9 CHEST PAIN: ICD-10-CM

## 2025-02-13 LAB
ALBUMIN SERPL BCP-MCNC: 4.2 G/DL (ref 3.5–5)
ALBUMIN/GLOB SERPL: 1.4 {RATIO}
ALP SERPL-CCNC: 57 U/L (ref 40–150)
ALT SERPL W P-5'-P-CCNC: 12 U/L
ANION GAP SERPL CALCULATED.3IONS-SCNC: 14 MMOL/L (ref 7–16)
AST SERPL W P-5'-P-CCNC: 20 U/L (ref 5–34)
BASOPHILS # BLD AUTO: 0.03 K/UL (ref 0–0.2)
BASOPHILS NFR BLD AUTO: 0.5 % (ref 0–1)
BILIRUB SERPL-MCNC: 0.4 MG/DL
BUN SERPL-MCNC: 16 MG/DL (ref 7–19)
BUN/CREAT SERPL: 24 (ref 6–20)
CALCIUM SERPL-MCNC: 8.7 MG/DL (ref 8.4–10.2)
CHLORIDE SERPL-SCNC: 107 MMOL/L (ref 98–107)
CO2 SERPL-SCNC: 20 MMOL/L (ref 22–29)
CREAT SERPL-MCNC: 0.68 MG/DL (ref 0.55–1.02)
DIFFERENTIAL METHOD BLD: ABNORMAL
EGFR (NO RACE VARIABLE) (RUSH/TITUS): 114 ML/MIN/1.73M2
EOSINOPHIL # BLD AUTO: 0.3 K/UL (ref 0–0.5)
EOSINOPHIL NFR BLD AUTO: 4.9 % (ref 1–4)
ERYTHROCYTE [DISTWIDTH] IN BLOOD BY AUTOMATED COUNT: 14.1 % (ref 11.5–14.5)
GLOBULIN SER-MCNC: 3 G/DL (ref 2–4)
GLUCOSE SERPL-MCNC: 116 MG/DL (ref 74–100)
HCT VFR BLD AUTO: 37.3 % (ref 38–47)
HGB BLD-MCNC: 11.3 G/DL (ref 12–16)
HYPOCHROMIA BLD QL SMEAR: ABNORMAL
IMM GRANULOCYTES # BLD AUTO: 0.01 K/UL (ref 0–0.04)
IMM GRANULOCYTES NFR BLD: 0.2 % (ref 0–0.4)
INFLUENZA A MOLECULAR (OHS): NEGATIVE
INFLUENZA B MOLECULAR (OHS): NEGATIVE
LYMPHOCYTES # BLD AUTO: 3.28 K/UL (ref 1–4.8)
LYMPHOCYTES NFR BLD AUTO: 54 % (ref 27–41)
MCH RBC QN AUTO: 22 PG (ref 27–31)
MCHC RBC AUTO-ENTMCNC: 30.3 G/DL (ref 32–36)
MCV RBC AUTO: 72.7 FL (ref 80–96)
MICROCYTES BLD QL SMEAR: ABNORMAL
MONOCYTES # BLD AUTO: 0.46 K/UL (ref 0–0.8)
MONOCYTES NFR BLD AUTO: 7.6 % (ref 2–6)
MPC BLD CALC-MCNC: ABNORMAL G/DL
NEUTROPHILS # BLD AUTO: 1.99 K/UL (ref 1.8–7.7)
NEUTROPHILS NFR BLD AUTO: 32.8 % (ref 53–65)
NRBC # BLD AUTO: 0 X10E3/UL
NRBC, AUTO (.00): 0 %
NT-PROBNP SERPL-MCNC: 63 PG/ML (ref 1–125)
OVALOCYTES BLD QL SMEAR: ABNORMAL
PLATELET # BLD AUTO: 171 K/UL (ref 150–400)
PLATELET MORPHOLOGY: ABNORMAL
POTASSIUM SERPL-SCNC: 4.1 MMOL/L (ref 3.5–5.1)
PROT SERPL-MCNC: 7.2 G/DL (ref 6.4–8.3)
RBC # BLD AUTO: 5.13 M/UL (ref 4.2–5.4)
SARS-COV-2 RDRP RESP QL NAA+PROBE: NEGATIVE
SODIUM SERPL-SCNC: 137 MMOL/L (ref 136–145)
TROPONIN I SERPL HS-MCNC: <2.7 NG/L
WBC # BLD AUTO: 6.07 K/UL (ref 4.5–11)

## 2025-02-13 PROCEDURE — 63600175 PHARM REV CODE 636 W HCPCS: Performed by: NURSE PRACTITIONER

## 2025-02-13 PROCEDURE — 83880 ASSAY OF NATRIURETIC PEPTIDE: CPT | Performed by: NURSE PRACTITIONER

## 2025-02-13 PROCEDURE — 80053 COMPREHEN METABOLIC PANEL: CPT | Performed by: NURSE PRACTITIONER

## 2025-02-13 PROCEDURE — 87502 INFLUENZA DNA AMP PROBE: CPT | Performed by: NURSE PRACTITIONER

## 2025-02-13 PROCEDURE — 99285 EMERGENCY DEPT VISIT HI MDM: CPT | Mod: 25

## 2025-02-13 PROCEDURE — 36415 COLL VENOUS BLD VENIPUNCTURE: CPT | Performed by: NURSE PRACTITIONER

## 2025-02-13 PROCEDURE — 93005 ELECTROCARDIOGRAM TRACING: CPT

## 2025-02-13 PROCEDURE — 85025 COMPLETE CBC W/AUTO DIFF WBC: CPT | Performed by: NURSE PRACTITIONER

## 2025-02-13 PROCEDURE — 84484 ASSAY OF TROPONIN QUANT: CPT | Performed by: NURSE PRACTITIONER

## 2025-02-13 PROCEDURE — 87635 SARS-COV-2 COVID-19 AMP PRB: CPT | Performed by: NURSE PRACTITIONER

## 2025-02-13 PROCEDURE — 96372 THER/PROPH/DIAG INJ SC/IM: CPT | Performed by: NURSE PRACTITIONER

## 2025-02-13 RX ORDER — KETOROLAC TROMETHAMINE 30 MG/ML
30 INJECTION, SOLUTION INTRAMUSCULAR; INTRAVENOUS
Status: COMPLETED | OUTPATIENT
Start: 2025-02-13 | End: 2025-02-13

## 2025-02-13 RX ORDER — PREDNISONE 10 MG/1
10 TABLET ORAL DAILY
Qty: 21 TABLET | Refills: 0 | Status: SHIPPED | OUTPATIENT
Start: 2025-02-13

## 2025-02-13 RX ORDER — DEXAMETHASONE SODIUM PHOSPHATE 4 MG/ML
4 INJECTION, SOLUTION INTRA-ARTICULAR; INTRALESIONAL; INTRAMUSCULAR; INTRAVENOUS; SOFT TISSUE
Status: COMPLETED | OUTPATIENT
Start: 2025-02-13 | End: 2025-02-13

## 2025-02-13 RX ORDER — IBUPROFEN 800 MG/1
800 TABLET ORAL EVERY 6 HOURS PRN
Qty: 20 TABLET | Refills: 0 | Status: SHIPPED | OUTPATIENT
Start: 2025-02-13

## 2025-02-13 RX ADMIN — DEXAMETHASONE SODIUM PHOSPHATE 4 MG: 4 INJECTION, SOLUTION INTRA-ARTICULAR; INTRALESIONAL; INTRAMUSCULAR; INTRAVENOUS; SOFT TISSUE at 09:02

## 2025-02-13 RX ADMIN — KETOROLAC TROMETHAMINE 30 MG: 30 INJECTION, SOLUTION INTRAMUSCULAR at 09:02

## 2025-02-14 LAB
OHS QRS DURATION: 80 MS
OHS QTC CALCULATION: 412 MS

## 2025-02-14 NOTE — ED PROVIDER NOTES
Encounter Date: 2/13/2025       History     Chief Complaint   Patient presents with    Chest Pain    Back Pain     States has been having chest pain and back pain for about 1 week and states feels soreness to her extremities.      Cough     39 year old female presents to ED with complaint of chest pain, back pain, and cough. Patient states she has had symptoms for approximately 1 week and was seen at ED at outside facility and was prescribed antibiotic for URI. Patient states she has continued to have cough and reports associated chest and back pain with coughing. Patient also reports having soreness to her extremities. Patient states history of CHF and is followed by Cardiology. Denies known fever, chills, nausea/vomiting, diarrhea.     The history is provided by the patient and medical records.     Review of patient's allergies indicates:  No Known Allergies  Past Medical History:   Diagnosis Date    Anxiety     Depression      Past Surgical History:   Procedure Laterality Date    BREAST SURGERY      LEFT HEART CATHETERIZATION N/A 03/19/2024    Procedure: Left heart cath;  Surgeon: August Guajardo MD;  Location: [facility] CATH LAB;  Service: Cardiology;  Laterality: N/A;     Family History   Problem Relation Name Age of Onset    Hypertension Mother      Hypertension Father      Stroke Sister      Breast cancer Maternal Grandmother       Social History     Tobacco Use    Smoking status: Every Day     Current packs/day: 0.50     Types: Cigarettes     Passive exposure: Current    Smokeless tobacco: Never   Substance Use Topics    Alcohol use: Not Currently     Comment: occasionally    Drug use: Not Currently     Frequency: 2.0 times per week     Types: Marijuana, Hydrocodone     Comment: last smoked 4 days ago     Review of Systems   Constitutional:  Negative for chills and fever.   Eyes:  Negative for photophobia and visual disturbance.   Respiratory:  Positive for cough and shortness of breath.     Cardiovascular:  Positive for chest pain. Negative for palpitations.   Gastrointestinal:  Negative for nausea and vomiting.   Musculoskeletal:  Positive for arthralgias and back pain.   Skin:  Negative for color change and wound.   Neurological:  Negative for dizziness and weakness.   Hematological:  Negative for adenopathy. Does not bruise/bleed easily.   Psychiatric/Behavioral:  Negative for agitation and confusion.    All other systems reviewed and are negative.      Physical Exam     Initial Vitals [02/13/25 1937]   BP Pulse Resp Temp SpO2   106/75 87 20 99 °F (37.2 °C) 99 %      MAP       --         Physical Exam    Nursing note and vitals reviewed.  Constitutional: She appears well-developed and well-nourished.   HENT:   Head: Normocephalic and atraumatic.   Eyes: EOM are normal. Pupils are equal, round, and reactive to light.   Neck: Neck supple.   Normal range of motion.  Cardiovascular:  Normal rate and regular rhythm.           No murmur heard.  Pulmonary/Chest: She has no wheezes. She has no rhonchi.   Abdominal: Abdomen is soft. She exhibits no distension. There is no abdominal tenderness.   Musculoskeletal:         General: No tenderness or edema.      Cervical back: Normal range of motion and neck supple.     Lymphadenopathy:     She has no cervical adenopathy.   Neurological: She is alert and oriented to person, place, and time. No cranial nerve deficit or sensory deficit.   Skin: Skin is warm and dry. Capillary refill takes less than 2 seconds.   Psychiatric: She has a normal mood and affect. Thought content normal.         Medical Screening Exam   See Full Note    ED Course   Procedures  Labs Reviewed   COMPREHENSIVE METABOLIC PANEL - Abnormal       Result Value    Sodium 137      Potassium 4.1      Chloride 107      CO2 20 (*)     Anion Gap 14      Glucose 116 (*)     BUN 16      Creatinine 0.68      BUN/Creatinine Ratio 24 (*)     Calcium 8.7      Total Protein 7.2      Albumin 4.2      Globulin  3.0      A/G Ratio 1.4      Bilirubin, Total 0.4      Alk Phos 57      ALT 12      AST 20      eGFR 114     CBC WITH DIFFERENTIAL - Abnormal    WBC 6.07      RBC 5.13      Hemoglobin 11.3 (*)     Hematocrit 37.3 (*)     MCV 72.7 (*)     MCH 22.0 (*)     MCHC 30.3 (*)     RDW 14.1      Platelet Count 171      MPV        Neutrophils % 32.8 (*)     Lymphocytes % 54.0 (*)     Monocytes % 7.6 (*)     Eosinophils % 4.9 (*)     Basophils % 0.5      Immature Granulocytes % 0.2      nRBC, Auto 0.0      Neutrophils, Abs 1.99      Lymphocytes, Absolute 3.28      Monocytes, Absolute 0.46      Eosinophils, Absolute 0.30      Basophils, Absolute 0.03      Immature Granulocytes, Absolute 0.01      nRBC, Absolute 0.00      Diff Type Scan Smear     CBC MORPHOLOGY - Abnormal    Platelet Morphology Few Large Platelets (*)     Microcytosis 1+      Ovalocytes Few      Hypochromic Few     INFLUENZA A & B BY MOLECULAR - Normal    INFLUENZA A MOLECULAR Negative      INFLUENZA B MOLECULAR  Negative     TROPONIN I - Normal    Troponin I High Sensitivity <2.7     NT-PRO NATRIURETIC PEPTIDE - Normal    ProBNP 63     SARS-COV-2 RNA AMPLIFICATION, QUAL - Normal    SARS COV-2 Molecular Negative      Narrative:     Negative SARS-CoV results should not be used as the sole basis for treatment or patient management decisions; negative results should be considered in the context of a patient's recent exposures, history and the presene of clinical signs and symptoms consistent with COVID-19.  Negative results should be treated as presumptive and confirmed by molecular assay, if necessary for patient management.   CBC W/ AUTO DIFFERENTIAL    Narrative:     The following orders were created for panel order CBC auto differential.  Procedure                               Abnormality         Status                     ---------                               -----------         ------                     CBC with Differential[[id number]]       Abnormal        "     Final result                 Please view results for these tests on the individual orders.          Imaging Results              X-Ray Chest PA And Lateral (Final result)  Result time 02/13/25 20:59:20      Final result by Raheem Lawton MD (02/13/25 20:59:20)                   Impression:      No acute cardiopulmonary finding.      Electronically signed by: Raheem Lawton MD  Date:    02/13/2025  Time:    20:59               Narrative:    EXAMINATION:  XR CHEST PA AND LATERAL    CLINICAL HISTORY:  Provided history is "Chest Pain;  ".    TECHNIQUE:  Frontal and lateral views of the chest were performed.    COMPARISON:  07/19/2024.    FINDINGS:  Cardiac silhouette is not enlarged. No focal consolidation.  No sizable pleural effusion.  No pneumothorax.  Mild scoliotic curvature in the spine.                                       Medications   dexAMETHasone injection 4 mg (4 mg Intramuscular Given 2/13/25 2144)   ketorolac injection 30 mg (30 mg Intramuscular Given 2/13/25 2144)     Medical Decision Making  39 year old female presents to ED with complaint of chest pain, back pain, and cough. Patient states she has had symptoms for approximately 1 week and was seen at ED at outside facility and was prescribed antibiotic for URI. Patient states she has continued to have cough and reports associated chest and back pain with coughing. Patient also reports having soreness to her extremities. Patient states history of CHF and is followed by Cardiology. Denies known fever, chills, nausea/vomiting, diarrhea.     Labs, diagnostics ordered and reviewed  Radiologist interpretation reviewed    EKG ordered and reviewed  EKG significant for no ST elevation  Rate 71  Rhythm sinus rhythm with 1st degree AVB  Interpreted by ED physician    Prescriptions provided  IM Toradol, decadron administered    Amount and/or Complexity of Data Reviewed  Labs: ordered.  Radiology: ordered.    Risk  Prescription drug management.           "                            Clinical Impression:   Final diagnoses:  [R07.9] Chest pain  [M54.9] Back pain, unspecified back location, unspecified back pain laterality, unspecified chronicity (Primary)        ED Disposition Condition    Discharge Stable          ED Prescriptions       Medication Sig Dispense Start Date End Date Auth. Provider    ibuprofen (ADVIL,MOTRIN) 800 MG tablet Take 1 tablet (800 mg total) by mouth every 6 (six) hours as needed for Pain. 20 tablet 2/13/2025 -- Mckenzie Menard FNP    predniSONE (DELTASONE) 10 MG tablet Take 1 tablet (10 mg total) by mouth once daily. Take 4 tabs x 3 days, then take 2 tabs x 3 days, then take 1 tab x 3 days. 21 tablet 2/13/2025 -- Mckenzie Menard FNP          Follow-up Information    None          Mckenzie Menard FNP  02/14/25 5084

## 2025-02-14 NOTE — DISCHARGE INSTRUCTIONS
Follow up with PCP in 48-72 hours. Return to ED if any new or worsening of symptoms occur. Continue prescribed antibiotics

## 2025-02-17 DIAGNOSIS — E53.8 VITAMIN B12 DEFICIENCY: Primary | ICD-10-CM

## 2025-02-26 ENCOUNTER — OFFICE VISIT (OUTPATIENT)
Dept: NEUROLOGY | Facility: CLINIC | Age: 40
End: 2025-02-26
Payer: MEDICAID

## 2025-02-26 VITALS
DIASTOLIC BLOOD PRESSURE: 68 MMHG | BODY MASS INDEX: 22.15 KG/M2 | RESPIRATION RATE: 18 BRPM | OXYGEN SATURATION: 98 % | HEIGHT: 62 IN | WEIGHT: 120.38 LBS | SYSTOLIC BLOOD PRESSURE: 106 MMHG | HEART RATE: 98 BPM

## 2025-02-26 DIAGNOSIS — F41.9 ANXIETY: ICD-10-CM

## 2025-02-26 DIAGNOSIS — E53.8 VITAMIN B12 DEFICIENCY: Primary | ICD-10-CM

## 2025-02-26 PROCEDURE — 3078F DIAST BP <80 MM HG: CPT | Mod: CPTII,,, | Performed by: NURSE PRACTITIONER

## 2025-02-26 PROCEDURE — 3008F BODY MASS INDEX DOCD: CPT | Mod: CPTII,,, | Performed by: NURSE PRACTITIONER

## 2025-02-26 PROCEDURE — 1160F RVW MEDS BY RX/DR IN RCRD: CPT | Mod: CPTII,,, | Performed by: NURSE PRACTITIONER

## 2025-02-26 PROCEDURE — 99999 PR PBB SHADOW E&M-EST. PATIENT-LVL IV: CPT | Mod: PBBFAC,,, | Performed by: NURSE PRACTITIONER

## 2025-02-26 PROCEDURE — 1159F MED LIST DOCD IN RCRD: CPT | Mod: CPTII,,, | Performed by: NURSE PRACTITIONER

## 2025-02-26 PROCEDURE — 99212 OFFICE O/P EST SF 10 MIN: CPT | Mod: S$PBB,,, | Performed by: NURSE PRACTITIONER

## 2025-02-26 PROCEDURE — 3074F SYST BP LT 130 MM HG: CPT | Mod: CPTII,,, | Performed by: NURSE PRACTITIONER

## 2025-02-26 PROCEDURE — 99214 OFFICE O/P EST MOD 30 MIN: CPT | Mod: PBBFAC | Performed by: NURSE PRACTITIONER

## 2025-02-26 NOTE — PROGRESS NOTES
Subjective:       Patient ID: Windy Alcocer is a 39 y.o. female     Chief Complaint:    Chief Complaint   Patient presents with    Follow-up    Tremors        Allergies:  Patient has no known allergies.    Current Medications:    Outpatient Encounter Medications as of 2025   Medication Sig Dispense Refill    clonazePAM (KLONOPIN) 1 MG tablet Take 1 tablet (1 mg total) by mouth 2 (two) times daily. 60 tablet 0    ergocalciferol (ERGOCALCIFEROL) 50,000 unit Cap Take 1 capsule (50,000 Units total) by mouth every 7 days. 4 capsule 2    [DISCONTINUED] cyanocobalamin-methylcobalamin 600-600 mcg Subl Place 1 tablet under the tongue once daily. 30 each 11    amitriptyline (ELAVIL) 10 MG tablet Take 1 tablet (10 mg total) by mouth nightly as needed for Insomnia. (Patient not taking: Reported on 2025) 90 tablet 1    [] cefdinir (OMNICEF) 300 MG capsule Take 1 capsule (300 mg total) by mouth 2 (two) times daily. for 10 days 20 capsule 0    cyanocobalamin-methylcobalamin 600-600 mcg Subl Place 1 tablet under the tongue once daily. 30 each 11    ibuprofen (ADVIL,MOTRIN) 800 MG tablet Take 1 tablet (800 mg total) by mouth every 6 (six) hours as needed for Pain. (Patient not taking: Reported on 2025) 20 tablet 0    metoprolol succinate (TOPROL-XL) 25 MG 24 hr tablet Take 1 tablet (25 mg total) by mouth once daily. (Patient not taking: Reported on 2025) 90 tablet 1    predniSONE (DELTASONE) 10 MG tablet Take 1 tablet (10 mg total) by mouth once daily. Take 4 tabs x 3 days, then take 2 tabs x 3 days, then take 1 tab x 3 days. (Patient not taking: Reported on 2025) 21 tablet 0     No facility-administered encounter medications on file as of 2025.       History of Present Illness  40 y/o female following in neurology for reported subjective muscle twitching    Her referral note was reviewed.  Patient with underlying history of anxiety depression, was at that time on reduction treatment of  clonazepam and reported intermittent symptoms of twitching without clear localization.  She had felt possibly her neck, however CT cervical spine done at [facility] in September of 2024 did not indicate any significant stenosis or spacing complications.  There is mention of anemia in medical history, but no recent labs for B12 or folate which I did obtain after her initial visit with us.    She reports symptoms no clear triggers, but possibly when she becomes upset, so very likely anxiety could be factor, however I did obtain EEG which was done 12/30/24 and noted negative.  I did obtain basic labs which indicated a B12 of 289 and vitamin D of 18.  I sent in vitamin D supplement and recommended OTC B12 daily supplement and have level repeated in 6 months.  Ultimately per patient request, I sent in B12 oral Rx.    On exam I noted no clear neurological deficits, she had a very slight action tremor in the BUE, but no other clear complications.  Already on beta blocker treatment.  She had inquired if we would prescribe her the clonazepam but I did report that we do not prescribe that medication through neurology.  Ultimately it would seem she has been restarted on this medication.  Reviewed the EMR, including primary care as well as cardiology notes.  There is clear stress/anxiety component in her history and likely adding to her symptoms.    Overall she seems looking like she is doing good today.  She can follow-up with neurology PRN, she can have her primary care recheck her B12 and vitamin D           Review of Systems  Review of Systems   Constitutional:  Negative for diaphoresis and fever.   HENT:  Negative for congestion, hearing loss and tinnitus.    Eyes:  Negative for blurred vision, double vision, photophobia, discharge and redness.   Respiratory:  Negative for cough and shortness of breath.    Cardiovascular:  Negative for chest pain.   Gastrointestinal:  Negative for abdominal pain, nausea and vomiting.    Musculoskeletal:  Negative for back pain, joint pain, myalgias and neck pain.   Skin:  Negative for itching and rash.   Neurological:  Positive for tremors. Negative for dizziness, sensory change, speech change, focal weakness, seizures, loss of consciousness, weakness and headaches.   Psychiatric/Behavioral:  Negative for depression, hallucinations and memory loss. The patient is nervous/anxious. The patient does not have insomnia.    All other systems reviewed and are negative.     Objective:     NEUROLOGICAL EXAMINATION:     MENTAL STATUS   Oriented to person, place, and time.   Attention: normal. Concentration: normal.   Speech: speech is normal   Level of consciousness: alert  Knowledge: good and consistent with education.   Normal comprehension.     CRANIAL NERVES     CN II   Visual fields full to confrontation.   Visual acuity: normal  Right visual field deficit: none  Left visual field deficit: none     CN III, IV, VI   Pupils are equal, round, and reactive to light.  Extraocular motions are normal.   Right pupil: Size: 3 mm. Shape: regular. Reactivity: brisk. Consensual response: intact. Accommodation: intact.   Left pupil: Size: 3 mm. Shape: regular. Reactivity: brisk. Consensual response: intact. Accommodation: intact.   CN III: no CN III palsy  CN VI: no CN VI palsy  Nystagmus: none   Diplopia: none  Upgaze: normal  Downgaze: normal  Conjugate gaze: present  Vestibulo-ocular reflex: present    CN V   Facial sensation intact.   Right facial sensation deficit: none  Left facial sensation deficit: none  Right corneal reflex: normal  Left corneal reflex: normal    CN VII   Facial expression full, symmetric.   Right facial weakness: none  Left facial weakness: none  Right taste: normal  Left taste: normal    CN VIII   CN VIII normal.   Hearing: intact    CN IX, X   CN IX normal.   CN X normal.   Palate: symmetric    CN XI   CN XI normal.   Right sternocleidomastoid strength: normal  Left sternocleidomastoid  strength: normal  Right trapezius strength: normal  Left trapezius strength: normal    CN XII   CN XII normal.   Tongue: not atrophic  Fasciculations: absent  Tongue deviation: none    MOTOR EXAM   Muscle bulk: normal  Overall muscle tone: normal  Right arm tone: normal  Left arm tone: normal  Right arm pronator drift: absent  Left arm pronator drift: absent  Right leg tone: normal  Left leg tone: normal    Strength   Right neck flexion: 5/5  Left neck flexion: 5/5  Right neck extension: 5/5  Left neck extension: 5/5  Right deltoid: 5/5  Left deltoid: 5/5  Right biceps: 5/5  Left biceps: 5/5  Right triceps: 5/5  Left triceps: 5/5  Right wrist flexion: 5/5  Left wrist flexion: 5/5  Right wrist extension: 5/5  Left wrist extension: 5/5  Right interossei: 5/5  Left interossei: 5/5  Right iliopsoas: 5/5  Left iliopsoas: 5/5  Right quadriceps: 5/5  Left quadriceps: 5/5  Right hamstrin/5  Left hamstrin/5  Right anterior tibial: 5/5  Left anterior tibial: 5/5  Right posterior tibial: 5/5  Left posterior tibial: 5/5  Right gastroc: 5/5  Left gastroc: 5/5    REFLEXES     Reflexes   Right brachioradialis: 2+  Left brachioradialis: 2+  Right biceps: 2+  Left biceps: 2+  Right triceps: 2+  Left triceps: 2+  Right patellar: 2+  Left patellar: 2+  Right achilles: 2+  Left achilles: 2+  Right plantar: normal  Left plantar: normal  Right Morse: absent  Left Morse: absent  Right ankle clonus: absent  Left ankle clonus: absent  Right pendular knee jerk: absent  Left pendular knee jerk: absent    SENSORY EXAM   Light touch normal.   Right arm light touch: normal  Left arm light touch: normal  Right leg light touch: normal  Left leg light touch: normal  Vibration normal.   Right arm vibration: normal  Left arm vibration: normal  Right leg vibration: normal  Left leg vibration: normal  Proprioception normal.   Right arm proprioception: normal  Left arm proprioception: normal  Right leg proprioception: normal  Left leg  proprioception: normal  Pinprick normal.   Right arm pinprick: normal  Left arm pinprick: normal  Right leg pinprick: normal  Left leg pinprick: normal  Graphesthesia: normal  Romberg: negative  Stereognosis: normal    GAIT AND COORDINATION     Gait  Gait: normal     Coordination   Finger to nose coordination: normal  Heel to shin coordination: normal  Tandem walking coordination: normal    Tremor   Resting tremor: absent  Intention tremor: absent  Action tremor: absent       Physical Exam  Vitals and nursing note reviewed.   Constitutional:       Appearance: Normal appearance.   HENT:      Head: Normocephalic.   Eyes:      Extraocular Movements: Extraocular movements intact and EOM normal.      Pupils: Pupils are equal, round, and reactive to light.   Pulmonary:      Effort: Pulmonary effort is normal.   Musculoskeletal:         General: No swelling or tenderness. Normal range of motion.      Cervical back: Normal range of motion and neck supple.      Right lower leg: No edema.      Left lower leg: No edema.   Skin:     General: Skin is warm and dry.      Coloration: Skin is not jaundiced.      Findings: No rash.   Neurological:      General: No focal deficit present.      Mental Status: She is alert and oriented to person, place, and time.      GCS: GCS eye subscore is 4. GCS verbal subscore is 5. GCS motor subscore is 6.      Cranial Nerves: No cranial nerve deficit.      Sensory: No sensory deficit.      Motor: Motor function is intact. No weakness.      Coordination: Coordination is intact. Coordination normal. Finger-Nose-Finger Test, Heel to Shin Test and Romberg Test normal.      Gait: Gait is intact. Gait and tandem walk normal.      Deep Tendon Reflexes: Reflexes normal.      Reflex Scores:       Tricep reflexes are 2+ on the right side and 2+ on the left side.       Bicep reflexes are 2+ on the right side and 2+ on the left side.       Brachioradialis reflexes are 2+ on the right side and 2+ on the left  side.       Patellar reflexes are 2+ on the right side and 2+ on the left side.       Achilles reflexes are 2+ on the right side and 2+ on the left side.  Psychiatric:         Mood and Affect: Mood normal.         Speech: Speech normal.         Behavior: Behavior normal.          Assessment:     Problem List Items Addressed This Visit          Psychiatric    Anxiety (Chronic)       Endocrine    Vitamin B12 deficiency - Primary    Relevant Medications    cyanocobalamin-methylcobalamin 600-600 mcg Subl          Primary Diagnosis and ICD10  Vitamin B12 deficiency [E53.8]    Plan:     There are no Patient Instructions on file for this visit.    Medications Discontinued During This Encounter   Medication Reason    cyanocobalamin-methylcobalamin 600-600 mcg Subl Reorder       Requested Prescriptions     Signed Prescriptions Disp Refills    cyanocobalamin-methylcobalamin 600-600 mcg Subl 30 each 11     Sig: Place 1 tablet under the tongue once daily.       No orders of the defined types were placed in this encounter.

## 2025-02-27 ENCOUNTER — OFFICE VISIT (OUTPATIENT)
Dept: CARDIOLOGY | Facility: CLINIC | Age: 40
End: 2025-02-27
Payer: MEDICAID

## 2025-02-27 VITALS
HEART RATE: 96 BPM | SYSTOLIC BLOOD PRESSURE: 106 MMHG | HEIGHT: 62 IN | WEIGHT: 123.81 LBS | DIASTOLIC BLOOD PRESSURE: 60 MMHG | OXYGEN SATURATION: 99 % | BODY MASS INDEX: 22.78 KG/M2

## 2025-02-27 DIAGNOSIS — R06.09 DOE (DYSPNEA ON EXERTION): Chronic | ICD-10-CM

## 2025-02-27 DIAGNOSIS — R07.89 NON-CARDIAC CHEST PAIN: Chronic | ICD-10-CM

## 2025-02-27 DIAGNOSIS — F41.9 ANXIETY: Primary | Chronic | ICD-10-CM

## 2025-02-27 PROCEDURE — 99999 PR PBB SHADOW E&M-EST. PATIENT-LVL III: CPT | Mod: PBBFAC,,, | Performed by: INTERNAL MEDICINE

## 2025-02-27 PROCEDURE — 3078F DIAST BP <80 MM HG: CPT | Mod: CPTII,,, | Performed by: INTERNAL MEDICINE

## 2025-02-27 PROCEDURE — 1159F MED LIST DOCD IN RCRD: CPT | Mod: CPTII,,, | Performed by: INTERNAL MEDICINE

## 2025-02-27 PROCEDURE — 3008F BODY MASS INDEX DOCD: CPT | Mod: CPTII,,, | Performed by: INTERNAL MEDICINE

## 2025-02-27 PROCEDURE — 1160F RVW MEDS BY RX/DR IN RCRD: CPT | Mod: CPTII,,, | Performed by: INTERNAL MEDICINE

## 2025-02-27 PROCEDURE — 99213 OFFICE O/P EST LOW 20 MIN: CPT | Mod: PBBFAC | Performed by: INTERNAL MEDICINE

## 2025-02-27 PROCEDURE — 99214 OFFICE O/P EST MOD 30 MIN: CPT | Mod: S$PBB,,, | Performed by: INTERNAL MEDICINE

## 2025-02-27 PROCEDURE — 3074F SYST BP LT 130 MM HG: CPT | Mod: CPTII,,, | Performed by: INTERNAL MEDICINE
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
## 2025-03-03 PROBLEM — R06.09 DOE (DYSPNEA ON EXERTION): Chronic | Status: ACTIVE | Noted: 2025-03-03

## 2025-03-03 PROBLEM — R07.89 NON-CARDIAC CHEST PAIN: Chronic | Status: ACTIVE | Noted: 2025-03-03

## 2025-03-12 ENCOUNTER — PATIENT MESSAGE (OUTPATIENT)
Dept: NEUROLOGY | Facility: CLINIC | Age: 40
End: 2025-03-12
Payer: MEDICAID

## 2025-03-12 RX ORDER — ERGOCALCIFEROL 1.25 MG/1
50000 CAPSULE ORAL
Qty: 3 CAPSULE | Refills: 3 | Status: SHIPPED | OUTPATIENT
Start: 2025-03-12

## 2025-03-13 ENCOUNTER — PATIENT MESSAGE (OUTPATIENT)
Dept: FAMILY MEDICINE | Facility: CLINIC | Age: 40
End: 2025-03-13
Payer: MEDICAID

## 2025-03-14 ENCOUNTER — HOSPITAL ENCOUNTER (EMERGENCY)
Facility: HOSPITAL | Age: 40
Discharge: HOME | End: 2025-03-14
Payer: MEDICAID

## 2025-03-14 VITALS
DIASTOLIC BLOOD PRESSURE: 71 MMHG | TEMPERATURE: 99 F | SYSTOLIC BLOOD PRESSURE: 112 MMHG | WEIGHT: 120 LBS | RESPIRATION RATE: 17 BRPM | HEIGHT: 62 IN | HEART RATE: 72 BPM | BODY MASS INDEX: 22.08 KG/M2 | OXYGEN SATURATION: 99 %

## 2025-03-14 DIAGNOSIS — R07.89 CHEST WALL PAIN: Primary | ICD-10-CM

## 2025-03-14 DIAGNOSIS — M62.830 MUSCLE SPASM OF BACK: ICD-10-CM

## 2025-03-14 DIAGNOSIS — R07.9 CHEST PAIN: ICD-10-CM

## 2025-03-14 DIAGNOSIS — M54.9 MID BACK PAIN: ICD-10-CM

## 2025-03-14 LAB
ALBUMIN SERPL BCP-MCNC: 3.8 G/DL (ref 3.5–5)
ALBUMIN/GLOB SERPL: 1.6 {RATIO}
ALP SERPL-CCNC: 44 U/L (ref 40–150)
ALT SERPL W P-5'-P-CCNC: 9 U/L
ANION GAP SERPL CALCULATED.3IONS-SCNC: 11 MMOL/L (ref 7–16)
AST SERPL W P-5'-P-CCNC: 14 U/L (ref 11–45)
BASOPHILS # BLD AUTO: 0.02 K/UL (ref 0–0.2)
BASOPHILS NFR BLD AUTO: 0.4 % (ref 0–1)
BILIRUB SERPL-MCNC: 0.7 MG/DL
BUN SERPL-MCNC: 16 MG/DL (ref 7–19)
BUN/CREAT SERPL: 26 (ref 6–20)
CALCIUM SERPL-MCNC: 9.1 MG/DL (ref 8.4–10.2)
CHLORIDE SERPL-SCNC: 106 MMOL/L (ref 98–107)
CO2 SERPL-SCNC: 25 MMOL/L (ref 22–29)
CREAT SERPL-MCNC: 0.62 MG/DL (ref 0.55–1.02)
DIFFERENTIAL METHOD BLD: ABNORMAL
EGFR (NO RACE VARIABLE) (RUSH/TITUS): 117 ML/MIN/1.73M2
EOSINOPHIL # BLD AUTO: 0.13 K/UL (ref 0–0.5)
EOSINOPHIL NFR BLD AUTO: 2.3 % (ref 1–4)
ERYTHROCYTE [DISTWIDTH] IN BLOOD BY AUTOMATED COUNT: 14.2 % (ref 11.5–14.5)
GLOBULIN SER-MCNC: 2.4 G/DL (ref 2–4)
GLUCOSE SERPL-MCNC: 93 MG/DL (ref 74–100)
HCG UR QL IA.RAPID: NEGATIVE
HCT VFR BLD AUTO: 33.1 % (ref 38–47)
HGB BLD-MCNC: 10.5 G/DL (ref 12–16)
HYPOCHROMIA BLD QL SMEAR: ABNORMAL
IMM GRANULOCYTES # BLD AUTO: 0.01 K/UL (ref 0–0.04)
IMM GRANULOCYTES NFR BLD: 0.2 % (ref 0–0.4)
LYMPHOCYTES # BLD AUTO: 2.49 K/UL (ref 1–4.8)
LYMPHOCYTES NFR BLD AUTO: 44.8 % (ref 27–41)
MCH RBC QN AUTO: 22.1 PG (ref 27–31)
MCHC RBC AUTO-ENTMCNC: 31.7 G/DL (ref 32–36)
MCV RBC AUTO: 69.7 FL (ref 80–96)
MICROCYTES BLD QL SMEAR: ABNORMAL
MONOCYTES # BLD AUTO: 0.48 K/UL (ref 0–0.8)
MONOCYTES NFR BLD AUTO: 8.6 % (ref 2–6)
MPC BLD CALC-MCNC: ABNORMAL G/DL
NEUTROPHILS # BLD AUTO: 2.43 K/UL (ref 1.8–7.7)
NEUTROPHILS NFR BLD AUTO: 43.7 % (ref 53–65)
NRBC # BLD AUTO: 0 X10E3/UL
NRBC, AUTO (.00): 0 %
PLATELET # BLD AUTO: 213 K/UL (ref 150–400)
PLATELET MORPHOLOGY: ABNORMAL
POTASSIUM SERPL-SCNC: 4.7 MMOL/L (ref 3.5–5.1)
PROT SERPL-MCNC: 6.2 G/DL (ref 6.4–8.3)
RBC # BLD AUTO: 4.75 M/UL (ref 4.2–5.4)
SODIUM SERPL-SCNC: 137 MMOL/L (ref 136–145)
TARGETS BLD QL SMEAR: ABNORMAL
TROPONIN I SERPL HS-MCNC: <2.7 NG/L
WBC # BLD AUTO: 5.56 K/UL (ref 4.5–11)

## 2025-03-14 PROCEDURE — 84484 ASSAY OF TROPONIN QUANT: CPT | Performed by: NURSE PRACTITIONER

## 2025-03-14 PROCEDURE — 99284 EMERGENCY DEPT VISIT MOD MDM: CPT | Mod: ,,, | Performed by: NURSE PRACTITIONER

## 2025-03-14 PROCEDURE — 99285 EMERGENCY DEPT VISIT HI MDM: CPT | Mod: 25

## 2025-03-14 PROCEDURE — 80053 COMPREHEN METABOLIC PANEL: CPT | Performed by: NURSE PRACTITIONER

## 2025-03-14 PROCEDURE — 93005 ELECTROCARDIOGRAM TRACING: CPT

## 2025-03-14 PROCEDURE — 36415 COLL VENOUS BLD VENIPUNCTURE: CPT | Performed by: NURSE PRACTITIONER

## 2025-03-14 PROCEDURE — 85025 COMPLETE CBC W/AUTO DIFF WBC: CPT | Performed by: NURSE PRACTITIONER

## 2025-03-14 PROCEDURE — 81025 URINE PREGNANCY TEST: CPT | Performed by: NURSE PRACTITIONER

## 2025-03-14 RX ORDER — CYCLOBENZAPRINE HCL 10 MG
10 TABLET ORAL 3 TIMES DAILY PRN
Qty: 15 TABLET | Refills: 0 | Status: SHIPPED | OUTPATIENT
Start: 2025-03-14 | End: 2025-03-19

## 2025-03-14 NOTE — ED PROVIDER NOTES
Encounter Date: 3/14/2025       History     Chief Complaint   Patient presents with    Chest Pain     Pt presents with intermittent chest pain for 4-6 weeks.    Back Pain     Back pain between scapulas x3 days.       Presented with c/o pain to chest and back between shoulder blades for the last 4-6 weeks. Denies fever, diaphoresis, dyspnea, cough, orthopnea, abd pain or n/v/d. 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 She does deny specific injury. Reports that pain does worsen with movement or lifting. Reports that she has taken some OTC over the course of her illness but has not helped. LMP 3/4/25. Denies known or suspected pregnancy.      Review of patient's allergies indicates:  No Known Allergies  Past Medical History:   Diagnosis Date    Anxiety     Depression      Past Surgical History:   Procedure Laterality Date    BREAST SURGERY      LEFT HEART CATHETERIZATION N/A 03/19/2024    Procedure: Left heart cath;  Surgeon: August Guajardo MD;  Location: [facility] CATH LAB;  Service: Cardiology;  Laterality: N/A;     Family History   Problem Relation Name Age of Onset    Hypertension Mother      Hypertension Father      Stroke Sister      Breast cancer Maternal Grandmother       Social History[1]  Review of Systems   Constitutional:  Negative for activity change, appetite change and fever.   HENT:  Negative for congestion.    Respiratory:  Negative for chest tightness, shortness of breath and wheezing.    Cardiovascular:  Positive for chest pain.   Gastrointestinal:  Negative for abdominal pain, diarrhea, nausea and vomiting.       Physical Exam     Initial Vitals [03/14/25 1650]   BP Pulse Resp Temp SpO2   105/67 80 16 99.4 °F (37.4 °C) 99 %      MAP       --         Physical Exam    Constitutional: She appears well-developed and well-nourished. No distress.   HENT:   Head: Normocephalic.   Right Ear: External ear normal.   Left Ear: External ear normal.   Nose: Nose normal. Mouth/Throat:  Oropharynx is clear and moist.   Eyes: Conjunctivae and EOM are normal.   Neck: Neck supple.   Normal range of motion.  Cardiovascular:  Normal rate, regular rhythm, normal heart sounds and intact distal pulses.           No murmur heard.  Pulmonary/Chest: Breath sounds normal. No respiratory distress. She has no wheezes. She has no rhonchi. She has no rales. She exhibits tenderness.   Abdominal: Abdomen is soft. Bowel sounds are normal. There is no abdominal tenderness.   Musculoskeletal:         General: Tenderness present. No edema. Normal range of motion.        Arms:       Cervical back: Normal range of motion and neck supple.     Neurological: She is alert and oriented to person, place, and time. She has normal strength. GCS score is 15. GCS eye subscore is 4. GCS verbal subscore is 5. GCS motor subscore is 6.   Skin: Skin is warm and dry. Capillary refill takes less than 2 seconds.   Psychiatric: She has a normal mood and affect.         Medical Screening Exam   See Full Note    ED Course   Procedures  Labs Reviewed   COMPREHENSIVE METABOLIC PANEL - Abnormal       Result Value    Sodium 137      Potassium 4.7      Chloride 106      CO2 25      Anion Gap 11      Glucose 93      BUN 16      Creatinine 0.62      BUN/Creatinine Ratio 26 (*)     Calcium 9.1      Total Protein 6.2 (*)     Albumin 3.8      Globulin 2.4      A/G Ratio 1.6      Bilirubin, Total 0.7      Alk Phos 44      ALT 9      AST 14      eGFR 117     CBC WITH DIFFERENTIAL - Abnormal    WBC 5.56      RBC 4.75      Hemoglobin 10.5 (*)     Hematocrit 33.1 (*)     MCV 69.7 (*)     MCH 22.1 (*)     MCHC 31.7 (*)     RDW 14.2      Platelet Count 213      MPV        Neutrophils % 43.7 (*)     Lymphocytes % 44.8 (*)     Monocytes % 8.6 (*)     Eosinophils % 2.3      Basophils % 0.4      Immature Granulocytes % 0.2      nRBC, Auto 0.0      Neutrophils, Abs 2.43      Lymphocytes, Absolute 2.49      Monocytes, Absolute 0.48      Eosinophils, Absolute 0.13       Basophils, Absolute 0.02      Immature Granulocytes, Absolute 0.01      nRBC, Absolute 0.00      Diff Type Scan Smear     CBC MORPHOLOGY - Abnormal    Platelet Morphology Large & Giant Platelets (*)     Microcytosis 1+      Target Cells 1+      Hypochromic 1+     TROPONIN I - Normal    Troponin I High Sensitivity <2.7     HCG QUALITATIVE URINE - Normal    HCG Qualitative, Urine Negative     CBC W/ AUTO DIFFERENTIAL    Narrative:     The following orders were created for panel order CBC auto differential.  Procedure                               Abnormality         Status                     ---------                               -----------         ------                     CBC with Differential[[id number]]       Abnormal            Final result                 Please view results for these tests on the individual orders.          Imaging Results              X-Ray Chest PA And Lateral (Final result)  Result time 03/14/25 18:27:32      Final result by Agus Gomez MD (03/14/25 18:27:32)                   Impression:      1. No acute cardiopulmonary process.      Electronically signed by: Agus Gomez MD  Date:    03/14/2025  Time:    18:27               Narrative:    EXAMINATION:  XR CHEST PA AND LATERAL    CLINICAL HISTORY:  Chest pain, unspecified    TECHNIQUE:  PA and lateral views of the chest were performed.    COMPARISON:  02/13/2025    FINDINGS:  The cardiomediastinal silhouette is not enlarged.  There is no pleural effusion.  The trachea is midline.  The lungs are symmetrically expanded bilaterally without evidence of acute parenchymal process. No large focal consolidation seen.  There is no pneumothorax.  The osseous structures are remarkable for dextroscoliotic curvature of the spine..                                       Medications - No data to display  Medical Decision Making  Presented with c/o pain to chest and back between shoulder blades for the last 4-6 weeks. Denies fever,  diaphoresis, dyspnea, cough, orthopnea, abd pain or n/v/d. 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 She does deny specific injury. Reports that pain does worsen with movement or lifting. Reports that she has taken some OTC over the course of her illness but has not helped. LMP 3/4/25. Denies known or suspected pregnancy.    Amount and/or Complexity of Data Reviewed  Labs: ordered. Decision-making details documented in ED Course.     Details: Troponin < 2.7, WBC 5560 with H&H 10.5 and 33.1, plt 450688, Na 137, K+ 4.7, BUN 16, creatinine 0.62, ALT 9, AST 14.  Radiology: ordered.     Details: CXR shows no acute abnormality.  ECG/medicine tests: ordered. Decision-making details documented in ED Course.    Risk  Prescription drug management.  Risk Details: Discussed assessment and diagnostic findings with pt. Offered Toradol IM. Pt refused med. Rx for Flexeril. Pt is stable./72, HR 71, RR 16, o2 sat 99% on RA.  Instructed on home care, med use, follow-up and return precautions. Discharged home with detailed written instructions provided.                 ED Course as of 03/14/25 1901   Fri Mar 14, 2025   1845 WBC: 5.56 [DS]   1845 Hemoglobin(!): 10.5 [DS]   1845 Hematocrit(!): 33.1 [DS]   1845 Platelet Count: 213 [DS]   1845 Sodium: 137 [DS]   1845 Potassium: 4.7 [DS]   1845 BUN: 16 [DS]   1845 Creatinine: 0.62 [DS]   1845 ALT: 9 [DS]   1845 AST: 14 [DS]   1845 Troponin I High Sensitivity: <2.7 [DS]      ED Course User Index  [DS] Marcela Madrid NP                           Clinical Impression:   Final diagnoses:  [R07.9] Chest pain  [M54.9] Mid back pain  [R07.9] Chest pain - pain between shoulders  [R07.89] Chest wall pain (Primary)  [M62.830] Muscle spasm of back        ED Disposition Condition    Discharge Stable          ED Prescriptions       Medication Sig Dispense Start Date End Date Auth. Provider    cyclobenzaprine (FLEXERIL) 10 MG tablet Take 1 tablet (10 mg total) by mouth 3  (three) times daily as needed for Muscle spasms. 15 tablet 3/14/2025 3/19/2025 Marcela Madrid NP          Follow-up Information       Follow up With Specialties Details Why Contact Info    [facility] - Emergency Department Emergency Medicine  If symptoms worsen with trouble breathing [address] [place] [id number] [phone]               [1]   Social History  Tobacco Use    Smoking status: Every Day     Current packs/day: 0.50     Types: Cigarettes     Passive exposure: Current    Smokeless tobacco: Never   Substance Use Topics    Alcohol use: Not Currently     Comment: occasionally    Drug use: Not Currently     Frequency: 2.0 times per week     Types: Hydrocodone     Comment: last smoked 4 days ago        Marcela Madrid NP  03/14/25 2541

## 2025-03-14 NOTE — DISCHARGE INSTRUCTIONS
Take Flexeril (cyclobenzaprine) as prescribed for muscle spasm/pain along with Tylenol or ibuprofen as needed for pain according to package instructions. Do not drive while taking the Flexeril due to it causing drowsiness. Apply warm compresses or cool compresses 4-6 times per day as needed. May use Icy Hot or Biofreeze topical analgesic to area of pain as needed for pain. Follow-up with your PCP if pain persists in 1 week. Return to the ED for new symptoms.

## 2025-03-14 NOTE — Clinical Note
"April "April" Leodan was seen and treated in our emergency department on 3/14/2025.  She may return to work on 03/16/2025.       If you have any questions or concerns, please don't hesitate to call.      Marcela Madrid NP"

## 2025-03-20 ENCOUNTER — OFFICE VISIT (OUTPATIENT)
Dept: FAMILY MEDICINE | Facility: CLINIC | Age: 40
End: 2025-03-20
Payer: MEDICAID

## 2025-03-20 VITALS
HEIGHT: 62 IN | DIASTOLIC BLOOD PRESSURE: 66 MMHG | SYSTOLIC BLOOD PRESSURE: 101 MMHG | BODY MASS INDEX: 22.63 KG/M2 | HEART RATE: 111 BPM | WEIGHT: 123 LBS

## 2025-03-20 DIAGNOSIS — E53.8 VITAMIN B12 DEFICIENCY: Primary | ICD-10-CM

## 2025-03-20 DIAGNOSIS — E55.9 VITAMIN D DEFICIENCY: ICD-10-CM

## 2025-03-20 DIAGNOSIS — R73.9 HYPERGLYCEMIA: ICD-10-CM

## 2025-03-20 DIAGNOSIS — E78.5 HYPERLIPIDEMIA, UNSPECIFIED HYPERLIPIDEMIA TYPE: ICD-10-CM

## 2025-03-20 DIAGNOSIS — F43.9 REACTION TO SEVERE STRESS: ICD-10-CM

## 2025-03-20 LAB
CHOLEST SERPL-MCNC: 149 MG/DL
CHOLEST/HDLC SERPL: 2.6 {RATIO}
EST. AVERAGE GLUCOSE BLD GHB EST-MCNC: 97 MG/DL
FOLATE SERPL-MCNC: 8.4 NG/ML (ref 7–31.4)
HBA1C MFR BLD HPLC: 5 %
HDLC SERPL-MCNC: 58 MG/DL (ref 35–60)
LDLC SERPL CALC-MCNC: 83 MG/DL
LDLC/HDLC SERPL: 1.4 {RATIO}
NONHDLC SERPL-MCNC: 91 MG/DL
TRIGL SERPL-MCNC: 40 MG/DL (ref 37–140)
VIT B12 SERPL-MCNC: 591 PG/ML (ref 213–816)
VLDLC SERPL-MCNC: 8 MG/DL

## 2025-03-20 PROCEDURE — 82746 ASSAY OF FOLIC ACID SERUM: CPT | Mod: ,,, | Performed by: CLINICAL MEDICAL LABORATORY

## 2025-03-20 PROCEDURE — 82607 VITAMIN B-12: CPT | Mod: ,,, | Performed by: CLINICAL MEDICAL LABORATORY

## 2025-03-20 PROCEDURE — 83036 HEMOGLOBIN GLYCOSYLATED A1C: CPT | Mod: ,,, | Performed by: CLINICAL MEDICAL LABORATORY

## 2025-03-20 PROCEDURE — 80061 LIPID PANEL: CPT | Mod: ,,, | Performed by: CLINICAL MEDICAL LABORATORY

## 2025-03-20 RX ORDER — CLONAZEPAM 1 MG/1
1 TABLET ORAL 2 TIMES DAILY
Qty: 180 TABLET | Refills: 1 | Status: SHIPPED | OUTPATIENT
Start: 2025-03-20 | End: 2025-09-16

## 2025-03-20 NOTE — PROGRESS NOTES
"              Ezra Morrison IV, DO  [facility] [place]  [address], [place] [address]  Phone: [phone]      Subjective     Name: Windy Alcocer   Sex: female  YOB: 1985 (39 y.o.)  MRN: [id number]  Visit Date: 3/20/25   Chief Complaint: Follow-up and Health Maintenance (HIV Screening Never done/TETANUS VACCINE Never done/Pneumococcal Vaccines (Age 0-49)(1 of 2 - PCV) Never done/COVID-19 Vaccine(1 - 2024-25 season) Never done/Declined covid)        HISTORY OF PRESENT ILLNESS:    Chief Complaint   Patient presents with    Follow-up    Health Maintenance     HIV Screening Never done  TETANUS VACCINE Never done  Pneumococcal Vaccines (Age 0-49)(1 of 2 - PCV) Never done  COVID-19 Vaccine(1 - 2024-25 season) Never done  Declined covid       HPI        Review of Systems   All other systems reviewed and are negative.          SOCIAL HISTORY:  Tobacco - Patient  reports that she has been smoking cigarettes. She has been exposed to tobacco smoke. She has never used smokeless tobacco.   Alcohol - Patient  reports that she does not currently use alcohol.   Recreational Drugs - Patient  reports that she does not currently use drugs after having used the following drugs: Hydrocodone. Frequency: 2.00 times per week.         See tests that keep you healthy and the problem oriented documentation below.    Tests to Keep You Healthy    Cervical Cancer Screening: Met on 9/17/2024  Tobacco Cessation: NO      Portions of this note may have been created with voice recognition software. Occasional "wrong-word" or "sound-a-like" substitutions may have occurred due to the inherent limitations of voice recognition software. Please, read the note carefully and recognize, using context, where substitutions have occurred.          Past Medical History:   Diagnosis Date    Anxiety     Depression         Review of patient's allergies indicates:  No Known Allergies     Past Surgical History:   Procedure Laterality " "Date    BREAST SURGERY      LEFT HEART CATHETERIZATION N/A 03/19/2024    Procedure: Left heart cath;  Surgeon: August Guajardo MD;  Location: [facility] CATH LAB;  Service: Cardiology;  Laterality: N/A;        Family History   Problem Relation Name Age of Onset    Hypertension Mother      Hypertension Father      Stroke Sister      Breast cancer Maternal Grandmother         Current Outpatient Medications   Medication Instructions    clonazePAM (KLONOPIN) 1 mg, Oral, 2 times daily    cyanocobalamin-methylcobalamin 600-600 mcg Subl 1 tablet, Sublingual, Daily    ergocalciferol (ERGOCALCIFEROL) 50,000 Units, Oral, Every 30 days        Objective     /66   Pulse (!) 111   Ht 5' 2" (1.575 m)   Wt 55.8 kg (123 lb)   LMP 03/04/2025 (Approximate)   BMI 22.50 kg/m²     Physical Exam  Constitutional:       General: She is not in acute distress.     Appearance: Normal appearance. She is not ill-appearing.   HENT:      Head: Normocephalic and atraumatic.      Right Ear: External ear normal.      Left Ear: External ear normal.   Eyes:      Extraocular Movements: Extraocular movements intact.      Conjunctiva/sclera: Conjunctivae normal.   Cardiovascular:      Rate and Rhythm: Normal rate.      Heart sounds: No murmur heard.  Pulmonary:      Effort: Pulmonary effort is normal.   Musculoskeletal:      Cervical back: Normal range of motion.   Skin:     General: Skin is warm and dry.      Coloration: Skin is not jaundiced.      Findings: No rash.   Neurological:      Mental Status: She is alert.   Psychiatric:         Mood and Affect: Mood normal.          All recently obtained labs have been reviewed and discussed in detail with the patient.   Assessment     1. Vitamin B12 deficiency    2. Reaction to severe stress    3. Vitamin D deficiency    4. Hyperlipidemia, unspecified hyperlipidemia type    5. Hyperglycemia         Plan        1. Vitamin B12 deficiency  -     Vitamin B12 & Folate; Future; Expected date: " 03/20/2025    2. Reaction to severe stress  Overview:  Patient described extreme stress due to life events that sound quite valid. The patient is having panic-attack like episodes, though she does not seem to have panic disorder. She is plausibly at risk for stress cardiomyopathy, feel it is reasonable to prescribe a short term (as in 3-6 months, then wean off) course of her klonopin. Discussed with patient that she needs to talk to her PCP about this - and that though I'm willing to prescribe her klonopin until she can see her PCP, Dr. Morrison ultimately should decide and manage her treatment. Patient understands, is amenable.     Orders:  -     clonazePAM (KLONOPIN) 1 MG tablet; Take 1 tablet (1 mg total) by mouth 2 (two) times daily.  Dispense: 180 tablet; Refill: 1    3. Vitamin D deficiency    4. Hyperlipidemia, unspecified hyperlipidemia type  -     Lipid Panel; Future; Expected date: 03/20/2025    5. Hyperglycemia  -     Hemoglobin A1C; Future; Expected date: 03/20/2025        No follow-ups on file.    Patient advised that is symptoms worsen, they should call or report directly to local emergency department.    Ezra Morrison, DO  [facility]

## 2025-03-31 ENCOUNTER — HOSPITAL ENCOUNTER (EMERGENCY)
Facility: HOSPITAL | Age: 40
Discharge: HOME | End: 2025-03-31
Payer: MEDICAID

## 2025-03-31 VITALS
DIASTOLIC BLOOD PRESSURE: 69 MMHG | SYSTOLIC BLOOD PRESSURE: 108 MMHG | WEIGHT: 123 LBS | HEART RATE: 85 BPM | BODY MASS INDEX: 22.63 KG/M2 | TEMPERATURE: 97 F | HEIGHT: 62 IN | RESPIRATION RATE: 18 BRPM | OXYGEN SATURATION: 100 %
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
## 2025-03-31 PROCEDURE — 99284 EMERGENCY DEPT VISIT MOD MDM: CPT | Mod: 25

## 2025-03-31 PROCEDURE — 99283 EMERGENCY DEPT VISIT LOW MDM: CPT | Mod: GF,,, | Performed by: NURSE PRACTITIONER

## 2025-03-31 PROCEDURE — 96372 THER/PROPH/DIAG INJ SC/IM: CPT | Performed by: NURSE PRACTITIONER

## 2025-03-31 PROCEDURE — 63600175 PHARM REV CODE 636 W HCPCS: Mod: UD | Performed by: NURSE PRACTITIONER

## 2025-03-31 RX ORDER — PREDNISONE 20 MG/1
40 TABLET ORAL DAILY
Qty: 10 TABLET | Refills: 0 | Status: SHIPPED | OUTPATIENT
Start: 2025-03-31 | End: 2025-04-05

## 2025-03-31 RX ORDER — DEXAMETHASONE SODIUM PHOSPHATE 4 MG/ML
8 INJECTION, SOLUTION INTRA-ARTICULAR; INTRALESIONAL; INTRAMUSCULAR; INTRAVENOUS; SOFT TISSUE
Status: COMPLETED | OUTPATIENT
Start: 2025-03-31 | End: 2025-03-31

## 2025-03-31 RX ADMIN — DEXAMETHASONE SODIUM PHOSPHATE 8 MG: 4 INJECTION, SOLUTION INTRA-ARTICULAR; INTRALESIONAL; INTRAMUSCULAR; INTRAVENOUS; SOFT TISSUE at 02:03

## 2025-03-31 NOTE — ED PROVIDER NOTES
Encounter Date: 3/31/2025       History     Chief Complaint   Patient presents with    Insect Bite     Exposed to bed bugs      Patient presents to the ED with complaints of insect bites. Patient reports she saw the bed bugs where she was staying with her mother. Reports itching.     The history is provided by the patient.     Review of patient's allergies indicates:  No Known Allergies  Past Medical History:   Diagnosis Date    Anxiety     Depression      Past Surgical History:   Procedure Laterality Date    BREAST SURGERY      LEFT HEART CATHETERIZATION N/A 03/19/2024    Procedure: Left heart cath;  Surgeon: August Guajardo MD;  Location: [facility] CATH LAB;  Service: Cardiology;  Laterality: N/A;     Family History   Problem Relation Name Age of Onset    Hypertension Mother      Hypertension Father      Stroke Sister      Breast cancer Maternal Grandmother       Social History[1]  Review of Systems   Constitutional: Negative.    Respiratory: Negative.     Cardiovascular: Negative.    Musculoskeletal: Negative.    Skin:  Positive for rash (insect bites).   Neurological: Negative.    Psychiatric/Behavioral: Negative.     All other systems reviewed and are negative.      Physical Exam     Initial Vitals [03/31/25 1333]   BP Pulse Resp Temp SpO2   108/69 85 18 96.7 °F (35.9 °C) 100 %      MAP       --         Physical Exam    Vitals reviewed.  Constitutional: She appears well-developed and well-nourished.   Cardiovascular:  Normal rate, regular rhythm, normal heart sounds and intact distal pulses.           Pulmonary/Chest: Breath sounds normal.   Musculoskeletal:         General: Normal range of motion.     Neurological: She is alert and oriented to person, place, and time. She has normal strength. GCS score is 15. GCS eye subscore is 4. GCS verbal subscore is 5. GCS motor subscore is 6.   Skin: Skin is warm and dry. Capillary refill takes less than 2 seconds. Rash noted. Rash is urticarial (generalized).    Psychiatric: She has a normal mood and affect. Her behavior is normal. Judgment and thought content normal.         Medical Screening Exam   See Full Note    ED Course   Procedures  Labs Reviewed - No data to display       Imaging Results    None          Medications   dexAMETHasone injection 8 mg (has no administration in time range)     Medical Decision Making  MDM    Patient presents for emergent evaluation of acute bed bugs that poses a threat to life and/or bodily function.    In the ED patient found to have acute bed bugs.      Discharge MDM  I discussed the treatment and discharge plan with the patient.   Patient was managed in the ED with IM Decadron.    The response to treatment was good.    Patient was discharged in stable condition.  Detailed return precautions discussed.    Risk  Prescription drug management.                                      Clinical Impression:   Final diagnoses:  [W57.XXXA] Bedbug bite, initial encounter (Primary)        ED Disposition Condition    Discharge Stable          ED Prescriptions       Medication Sig Dispense Start Date End Date Auth. Provider    predniSONE (DELTASONE) 20 MG tablet Take 2 tablets (40 mg total) by mouth once daily. for 5 days 10 tablet 3/31/2025 4/5/2025 Carey Ricci FNP          Follow-up Information    None            [1]   Social History  Tobacco Use    Smoking status: Every Day     Current packs/day: 0.50     Types: Cigarettes     Passive exposure: Current    Smokeless tobacco: Never   Substance Use Topics    Alcohol use: Not Currently     Comment: occasionally    Drug use: Not Currently     Frequency: 2.0 times per week     Types: Hydrocodone     Comment: last smoked 4 days ago        Carey Ricci FNP  03/31/25 [id number]

## 2025-03-31 NOTE — ED TRIAGE NOTES
Pt presents to the ER with c/o insect bites. Pt states she was exposed to what she thinks is bed bugs. States that she has bug bites on arms, back, and legs. Pt states she has not taken any medications for the itching due to she takes klonopin daily.

## 2025-04-02 ENCOUNTER — OFFICE VISIT (OUTPATIENT)
Dept: FAMILY MEDICINE | Facility: CLINIC | Age: 40
End: 2025-04-02
Payer: MEDICAID

## 2025-04-02 VITALS
DIASTOLIC BLOOD PRESSURE: 76 MMHG | HEART RATE: 72 BPM | WEIGHT: 122 LBS | HEIGHT: 62 IN | BODY MASS INDEX: 22.45 KG/M2 | SYSTOLIC BLOOD PRESSURE: 113 MMHG

## 2025-04-02 DIAGNOSIS — B88.9: Primary | ICD-10-CM

## 2025-04-02 PROCEDURE — 3044F HG A1C LEVEL LT 7.0%: CPT | Mod: CPTII,,,

## 2025-04-02 PROCEDURE — 1159F MED LIST DOCD IN RCRD: CPT | Mod: CPTII,,,

## 2025-04-02 PROCEDURE — 3074F SYST BP LT 130 MM HG: CPT | Mod: CPTII,,,

## 2025-04-02 PROCEDURE — 3008F BODY MASS INDEX DOCD: CPT | Mod: CPTII,,,

## 2025-04-02 PROCEDURE — 99214 OFFICE O/P EST MOD 30 MIN: CPT | Mod: ,,,

## 2025-04-02 PROCEDURE — 3078F DIAST BP <80 MM HG: CPT | Mod: CPTII,,,

## 2025-04-02 RX ORDER — DEXAMETHASONE SODIUM PHOSPHATE 4 MG/ML
4 INJECTION, SOLUTION INTRA-ARTICULAR; INTRALESIONAL; INTRAMUSCULAR; INTRAVENOUS; SOFT TISSUE
Status: DISCONTINUED | OUTPATIENT
Start: 2025-04-02 | End: 2025-04-02

## 2025-04-02 RX ORDER — HYDROXYZINE HYDROCHLORIDE 25 MG/1
25 TABLET, FILM COATED ORAL 3 TIMES DAILY
Qty: 30 TABLET | Refills: 0 | Status: SHIPPED | OUTPATIENT
Start: 2025-04-02 | End: 2025-04-02

## 2025-04-02 RX ORDER — METHYLPREDNISOLONE ACETATE 40 MG/ML
40 INJECTION, SUSPENSION INTRA-ARTICULAR; INTRALESIONAL; INTRAMUSCULAR; SOFT TISSUE
Status: DISCONTINUED | OUTPATIENT
Start: 2025-04-02 | End: 2025-04-02

## 2025-04-02 RX ORDER — CLINDAMYCIN HYDROCHLORIDE 300 MG/1
300 CAPSULE ORAL EVERY 6 HOURS
Qty: 28 CAPSULE | Refills: 0 | Status: SHIPPED | OUTPATIENT
Start: 2025-04-02 | End: 2025-04-02

## 2025-04-02 RX ORDER — PERMETHRIN 50 MG/G
CREAM TOPICAL
Qty: 60 G | Refills: 0 | Status: SHIPPED | OUTPATIENT
Start: 2025-04-02

## 2025-04-02 RX ORDER — TRIAMCINOLONE ACETONIDE 1 MG/G
OINTMENT TOPICAL 2 TIMES DAILY
Qty: 453.6 G | Refills: 0 | Status: SHIPPED | OUTPATIENT
Start: 2025-04-02

## 2025-04-02 NOTE — PROGRESS NOTES
Subjective     Patient ID: Windy Alcocer is a 39 y.o. female.    Chief Complaint: Rash    Presents to clinic for skin rash worsening after starting prednisone. Seen at ER for bed bug bites and given medications and injections. Patient is concerned this is a fungal rash and not bites. Home is being treated for bed bugs. Rash does not appear fungal and looks like bites and hives. Affected areas are hands, fingers, knees, forearms, thighs.     Rash  Pertinent negatives include no fever.     Review of Systems   Constitutional:  Negative for chills and fever.   Musculoskeletal:  Negative for myalgias.   Integumentary:  Positive for rash. Negative for wound.   Psychiatric/Behavioral:  Positive for sleep disturbance.           Objective     Physical Exam  Vitals and nursing note reviewed.   Constitutional:       Appearance: Normal appearance.   Cardiovascular:      Rate and Rhythm: Normal rate and regular rhythm.      Pulses: Normal pulses.      Heart sounds: Normal heart sounds.   Pulmonary:      Effort: Pulmonary effort is normal.      Breath sounds: Normal breath sounds.   Musculoskeletal:         General: Normal range of motion.      Cervical back: Normal range of motion and neck supple.   Skin:     General: Skin is warm and dry.      Capillary Refill: Capillary refill takes less than 2 seconds.      Findings: Rash present. Rash is urticarial.   Neurological:      General: No focal deficit present.      Mental Status: She is alert and oriented to person, place, and time.            Assessment and Plan     1. Infestation, parasitic, skin    -     permethrin (ELIMITE) 5 % cream; Apply cream from head to feet, leave on overnight, then wash with soap/water, repeat in 7-10 days  Dispense: 60 g; Refill: 0  -     triamcinolone acetonide 0.1% (KENALOG) 0.1 % ointment; Apply topically 2 (two) times daily.  Dispense: 453.6 g; Refill: 0        Continue currently prescribed meds and start new ones today. Going to treat for scabies  as well          Follow up if symptoms worsen or fail to improve.    I spent a total of 15 minutes on the day of the visit.This includes face to face time and non-face to face time preparing to see the patient (eg, review of tests), obtaining and/or reviewing separately obtained history, documenting clinical information in the electronic or other health record, independently interpreting results and communicating results to the patient/family/caregiver, or care coordinator.    KATHY Dumont

## 2025-04-08 ENCOUNTER — HOSPITAL ENCOUNTER (EMERGENCY)
Facility: HOSPITAL | Age: 40
Discharge: HOME | End: 2025-04-08
Payer: MEDICAID

## 2025-04-08 VITALS
RESPIRATION RATE: 17 BRPM | DIASTOLIC BLOOD PRESSURE: 70 MMHG | OXYGEN SATURATION: 100 % | BODY MASS INDEX: 22.08 KG/M2 | HEIGHT: 62 IN | TEMPERATURE: 99 F | SYSTOLIC BLOOD PRESSURE: 112 MMHG | HEART RATE: 96 BPM | WEIGHT: 120 LBS

## 2025-04-08 DIAGNOSIS — J00 COMMON COLD VIRUS: Primary | ICD-10-CM

## 2025-04-08 LAB
ALBUMIN SERPL BCP-MCNC: 3.9 G/DL (ref 3.5–5)
ALBUMIN/GLOB SERPL: 1.4 {RATIO}
ALP SERPL-CCNC: 43 U/L (ref 40–150)
ALT SERPL W P-5'-P-CCNC: 11 U/L
ANION GAP SERPL CALCULATED.3IONS-SCNC: 9 MMOL/L (ref 7–16)
ANISOCYTOSIS BLD QL SMEAR: ABNORMAL
AST SERPL W P-5'-P-CCNC: 18 U/L (ref 11–45)
BASOPHILS # BLD AUTO: 0.02 K/UL (ref 0–0.2)
BASOPHILS NFR BLD AUTO: 0.4 % (ref 0–1)
BILIRUB SERPL-MCNC: 0.5 MG/DL
BUN SERPL-MCNC: 12 MG/DL (ref 7–19)
BUN/CREAT SERPL: 21 (ref 6–20)
CALCIUM SERPL-MCNC: 9.1 MG/DL (ref 8.4–10.2)
CHLORIDE SERPL-SCNC: 107 MMOL/L (ref 98–107)
CO2 SERPL-SCNC: 25 MMOL/L (ref 22–29)
CREAT SERPL-MCNC: 0.58 MG/DL (ref 0.55–1.02)
DIFFERENTIAL METHOD BLD: ABNORMAL
EGFR (NO RACE VARIABLE) (RUSH/TITUS): 119 ML/MIN/1.73M2
EOSINOPHIL # BLD AUTO: 0.14 K/UL (ref 0–0.5)
EOSINOPHIL NFR BLD AUTO: 2.9 % (ref 1–4)
ERYTHROCYTE [DISTWIDTH] IN BLOOD BY AUTOMATED COUNT: 14.8 % (ref 11.5–14.5)
GLOBULIN SER-MCNC: 2.7 G/DL (ref 2–4)
GLUCOSE SERPL-MCNC: 81 MG/DL (ref 74–100)
HCT VFR BLD AUTO: 35.6 % (ref 38–47)
HGB BLD-MCNC: 11.4 G/DL (ref 12–16)
HYPOCHROMIA BLD QL SMEAR: ABNORMAL
IMM GRANULOCYTES # BLD AUTO: 0.01 K/UL (ref 0–0.04)
IMM GRANULOCYTES NFR BLD: 0.2 % (ref 0–0.4)
LYMPHOCYTES # BLD AUTO: 2.86 K/UL (ref 1–4.8)
LYMPHOCYTES NFR BLD AUTO: 58.5 % (ref 27–41)
MCH RBC QN AUTO: 22.3 PG (ref 27–31)
MCHC RBC AUTO-ENTMCNC: 32 G/DL (ref 32–36)
MCV RBC AUTO: 69.7 FL (ref 80–96)
MICROCYTES BLD QL SMEAR: ABNORMAL
MONOCYTES # BLD AUTO: 0.4 K/UL (ref 0–0.8)
MONOCYTES NFR BLD AUTO: 8.2 % (ref 2–6)
MPC BLD CALC-MCNC: ABNORMAL G/DL
NEUTROPHILS # BLD AUTO: 1.46 K/UL (ref 1.8–7.7)
NEUTROPHILS NFR BLD AUTO: 29.8 % (ref 53–65)
NRBC # BLD AUTO: 0 X10E3/UL
NRBC, AUTO (.00): 0 %
OVALOCYTES BLD QL SMEAR: ABNORMAL
PLATELET # BLD AUTO: 256 K/UL (ref 150–400)
PLATELET MORPHOLOGY: ABNORMAL
POTASSIUM SERPL-SCNC: 4.1 MMOL/L (ref 3.5–5.1)
PROT SERPL-MCNC: 6.6 G/DL (ref 6.4–8.3)
RBC # BLD AUTO: 5.11 M/UL (ref 4.2–5.4)
SCHISTOCYTES BLD QL AUTO: ABNORMAL
SODIUM SERPL-SCNC: 137 MMOL/L (ref 136–145)
WBC # BLD AUTO: 4.89 K/UL (ref 4.5–11)

## 2025-04-08 PROCEDURE — 80053 COMPREHEN METABOLIC PANEL: CPT | Performed by: NURSE PRACTITIONER

## 2025-04-08 PROCEDURE — 99283 EMERGENCY DEPT VISIT LOW MDM: CPT

## 2025-04-08 PROCEDURE — 99284 EMERGENCY DEPT VISIT MOD MDM: CPT | Mod: ,,, | Performed by: NURSE PRACTITIONER

## 2025-04-08 PROCEDURE — 85025 COMPLETE CBC W/AUTO DIFF WBC: CPT | Performed by: NURSE PRACTITIONER

## 2025-04-08 PROCEDURE — 36415 COLL VENOUS BLD VENIPUNCTURE: CPT | Performed by: NURSE PRACTITIONER

## 2025-04-08 NOTE — ED TRIAGE NOTES
Pt presents to the ER c c/o dry cough, congestion, and right sided chest discomfort that started Friday. Pt states that she has been taking Mucinex, but it doesn't help.

## 2025-04-08 NOTE — ED PROVIDER NOTES
Encounter Date: 4/8/2025       History     Chief Complaint   Patient presents with    Cough    Nasal Congestion    Chest Pain     Right side when coughing     Patient presents to the ED with complaints of cough, congestion and thinking she may be dehydrated. Patient denies any fever. Reports an improvement in symptoms with Mucinex.     The history is provided by the patient.     Review of patient's allergies indicates:  No Known Allergies  Past Medical History:   Diagnosis Date    Anxiety     Depression      Past Surgical History:   Procedure Laterality Date    BREAST SURGERY      LEFT HEART CATHETERIZATION N/A 03/19/2024    Procedure: Left heart cath;  Surgeon: August Guajardo MD;  Location: [facility] CATH LAB;  Service: Cardiology;  Laterality: N/A;     Family History   Problem Relation Name Age of Onset    Hypertension Mother      Hypertension Father      Stroke Sister      Breast cancer Maternal Grandmother       Social History[1]  Review of Systems   Constitutional: Negative.    HENT:  Positive for congestion.    Respiratory:  Positive for cough.    Cardiovascular: Negative.    Musculoskeletal: Negative.    Skin: Negative.    Neurological: Negative.    Psychiatric/Behavioral: Negative.     All other systems reviewed and are negative.      Physical Exam     Initial Vitals [04/08/25 1346]   BP Pulse Resp Temp SpO2   112/70 96 17 98.8 °F (37.1 °C) 100 %      MAP       --         Physical Exam    Vitals reviewed.  Constitutional: She appears well-developed and well-nourished.   HENT:   Head: Normocephalic and atraumatic.   Nose: Nose normal. Mouth/Throat: Oropharynx is clear and moist.   Cardiovascular:  Normal rate, regular rhythm, normal heart sounds and intact distal pulses.           Pulmonary/Chest: Breath sounds normal.   Musculoskeletal:         General: Normal range of motion.     Neurological: She is alert and oriented to person, place, and time. She has normal strength. GCS score is 15. GCS eye  subscore is 4. GCS verbal subscore is 5. GCS motor subscore is 6.   Skin: Skin is warm and dry. Capillary refill takes less than 2 seconds.   Psychiatric: She has a normal mood and affect. Her behavior is normal. Judgment and thought content normal.         Medical Screening Exam   See Full Note    ED Course   Procedures  Labs Reviewed   COMPREHENSIVE METABOLIC PANEL - Abnormal       Result Value    Sodium 137      Potassium 4.1      Chloride 107      CO2 25      Anion Gap 9      Glucose 81      BUN 12      Creatinine 0.58      BUN/Creatinine Ratio 21 (*)     Calcium 9.1      Total Protein 6.6      Albumin 3.9      Globulin 2.7      A/G Ratio 1.4      Bilirubin, Total 0.5      Alk Phos 43      ALT 11      AST 18      eGFR 119     CBC WITH DIFFERENTIAL - Abnormal    WBC 4.89      RBC 5.11      Hemoglobin 11.4 (*)     Hematocrit 35.6 (*)     MCV 69.7 (*)     MCH 22.3 (*)     MCHC 32.0      RDW 14.8 (*)     Platelet Count 256      MPV        Neutrophils % 29.8 (*)     Lymphocytes % 58.5 (*)     Monocytes % 8.2 (*)     Eosinophils % 2.9      Basophils % 0.4      Immature Granulocytes % 0.2      nRBC, Auto 0.0      Neutrophils, Abs 1.46 (*)     Lymphocytes, Absolute 2.86      Monocytes, Absolute 0.40      Eosinophils, Absolute 0.14      Basophils, Absolute 0.02      Immature Granulocytes, Absolute 0.01      nRBC, Absolute 0.00      Diff Type Scan Smear     CBC W/ AUTO DIFFERENTIAL    Narrative:     The following orders were created for panel order CBC auto differential.  Procedure                               Abnormality         Status                     ---------                               -----------         ------                     CBC with Differential[[id number]]       Abnormal            Final result                 Please view results for these tests on the individual orders.   CBC MORPHOLOGY          Imaging Results    None          Medications - No data to display  Medical Decision Making  MDM    Patient  presents for emergent evaluation of acute cough and congestion that poses a threat to life and/or bodily function.    In the ED patient found to have acute common cold.    I ordered labs and personally reviewed them.  Labs significant for WBC 4.89, H/H 11.4/35.6, Platelets 256, Na 137, K 4.1, Cl 107, BUN 12, Creat 0.58    Discharge MDM  I discussed the treatment and discharge plan with the patient.    Patient was discharged in stable condition.  Detailed return precautions discussed.    Amount and/or Complexity of Data Reviewed  Labs: ordered.                                      Clinical Impression:   Final diagnoses:  [J00] Common cold virus (Primary)        ED Disposition Condition    Discharge Stable          ED Prescriptions    None       Follow-up Information    None            [1]   Social History  Tobacco Use    Smoking status: Every Day     Current packs/day: 0.50     Types: Cigarettes     Passive exposure: Current    Smokeless tobacco: Never   Substance Use Topics    Alcohol use: Not Currently     Comment: occasionally    Drug use: Not Currently     Frequency: 2.0 times per week     Types: Hydrocodone     Comment: last smoked 4 days ago        Carey Ricci, [facility]  04/08/25 3283

## 2025-04-17 ENCOUNTER — OFFICE VISIT (OUTPATIENT)
Dept: FAMILY MEDICINE | Facility: CLINIC | Age: 40
End: 2025-04-17
Payer: MEDICAID

## 2025-04-17 VITALS
WEIGHT: 123.31 LBS | BODY MASS INDEX: 22.69 KG/M2 | HEART RATE: 90 BPM | DIASTOLIC BLOOD PRESSURE: 67 MMHG | SYSTOLIC BLOOD PRESSURE: 103 MMHG | HEIGHT: 62 IN

## 2025-04-17 DIAGNOSIS — E55.9 VITAMIN D DEFICIENCY: ICD-10-CM

## 2025-04-17 DIAGNOSIS — Z71.2 ENCOUNTER TO DISCUSS TEST RESULTS: Primary | ICD-10-CM

## 2025-04-17 DIAGNOSIS — E53.8 VITAMIN B12 DEFICIENCY: ICD-10-CM

## 2025-04-17 LAB
25(OH)D3 SERPL-MCNC: 18.8 NG/ML (ref 30–80)
FOLATE SERPL-MCNC: 11.2 NG/ML (ref 7–31.4)
VIT B12 SERPL-MCNC: 600 PG/ML (ref 213–816)

## 2025-04-17 PROCEDURE — 1160F RVW MEDS BY RX/DR IN RCRD: CPT | Mod: CPTII,,,

## 2025-04-17 PROCEDURE — 3008F BODY MASS INDEX DOCD: CPT | Mod: CPTII,,,

## 2025-04-17 PROCEDURE — 3078F DIAST BP <80 MM HG: CPT | Mod: CPTII,,,

## 2025-04-17 PROCEDURE — 82306 VITAMIN D 25 HYDROXY: CPT | Mod: ,,, | Performed by: CLINICAL MEDICAL LABORATORY

## 2025-04-17 PROCEDURE — 3074F SYST BP LT 130 MM HG: CPT | Mod: CPTII,,,

## 2025-04-17 PROCEDURE — 99213 OFFICE O/P EST LOW 20 MIN: CPT | Mod: ,,,

## 2025-04-17 PROCEDURE — 82607 VITAMIN B-12: CPT | Mod: ,,, | Performed by: CLINICAL MEDICAL LABORATORY

## 2025-04-17 PROCEDURE — 1159F MED LIST DOCD IN RCRD: CPT | Mod: CPTII,,,

## 2025-04-17 PROCEDURE — 82746 ASSAY OF FOLIC ACID SERUM: CPT | Mod: ,,, | Performed by: CLINICAL MEDICAL LABORATORY

## 2025-04-17 PROCEDURE — 3044F HG A1C LEVEL LT 7.0%: CPT | Mod: CPTII,,,

## 2025-04-17 NOTE — PROGRESS NOTES
Subjective     Patient ID: Windy Alcocer is a 39 y.o. female.    Chief Complaint: Follow-up    Presents to clinic to discuss labs drawn at ER namely the cbc. She denies any abnormal bleeding, bruising, or other concerns       Review of Systems   Constitutional:  Negative for activity change, fatigue and unexpected weight change.   Eyes:  Negative for visual disturbance.   Respiratory:  Negative for chest tightness and shortness of breath.    Cardiovascular:  Negative for chest pain, palpitations and leg swelling.   Gastrointestinal:  Negative for abdominal pain and blood in stool.   Genitourinary:  Negative for decreased urine volume, difficulty urinating, dysuria, flank pain, frequency and hematuria.   Neurological:  Negative for dizziness, vertigo, tremors, seizures, syncope, facial asymmetry, speech difficulty, weakness, light-headedness, numbness, headaches and memory loss.   Hematological:  Negative for adenopathy. Does not bruise/bleed easily.   Psychiatric/Behavioral:  The patient is not nervous/anxious.           Objective     Physical Exam  Constitutional:       Appearance: Normal appearance. She is normal weight.   Cardiovascular:      Rate and Rhythm: Normal rate.      Pulses: Normal pulses.   Pulmonary:      Effort: Pulmonary effort is normal.   Musculoskeletal:      Cervical back: Normal range of motion.   Skin:     General: Skin is warm.      Capillary Refill: Capillary refill takes less than 2 seconds.   Neurological:      General: No focal deficit present.      Mental Status: She is alert and oriented to person, place, and time.            Assessment and Plan     1. Encounter to discuss test results    2. Vitamin B12 deficiency  -     Vitamin B12 & Folate; Future; Expected date: 04/17/2025    3. Vitamin D deficiency  -     Vitamin D; Future; Expected date: 04/17/2025        Contact with labs  Encouraged healthy lifestyle changes          Follow up in about 3 months (around 7/17/2025).    I spent a  total of 15 minutes on the day of the visit.This includes face to face time and non-face to face time preparing to see the patient (eg, review of tests), obtaining and/or reviewing separately obtained history, documenting clinical information in the electronic or other health record, independently interpreting results and communicating results to the patient/family/caregiver, or care coordinator.      KATHY Dumont

## 2025-04-29 ENCOUNTER — PATIENT MESSAGE (OUTPATIENT)
Dept: NEUROLOGY | Facility: CLINIC | Age: 40
End: 2025-04-29
Payer: MEDICAID

## 2025-05-20 ENCOUNTER — HOSPITAL ENCOUNTER (EMERGENCY)
Facility: HOSPITAL | Age: 40
Discharge: HOME | End: 2025-05-21
Payer: MEDICAID

## 2025-05-20 VITALS
HEART RATE: 85 BPM | HEIGHT: 62 IN | DIASTOLIC BLOOD PRESSURE: 72 MMHG | WEIGHT: 125.19 LBS | RESPIRATION RATE: 18 BRPM | OXYGEN SATURATION: 99 % | BODY MASS INDEX: 23.04 KG/M2 | SYSTOLIC BLOOD PRESSURE: 112 MMHG | TEMPERATURE: 98 F

## 2025-05-20 DIAGNOSIS — R07.9 CHEST PAIN, UNSPECIFIED TYPE: Primary | ICD-10-CM

## 2025-05-20 DIAGNOSIS — S86.812A STRAIN OF LEFT CALF MUSCLE: ICD-10-CM

## 2025-05-20 DIAGNOSIS — R06.02 SHORTNESS OF BREATH: ICD-10-CM

## 2025-05-20 LAB
ALBUMIN SERPL BCP-MCNC: 4.1 G/DL (ref 3.5–5)
ALBUMIN/GLOB SERPL: 1.5 {RATIO}
ALP SERPL-CCNC: 50 U/L (ref 40–150)
ALT SERPL W P-5'-P-CCNC: 11 U/L
ANION GAP SERPL CALCULATED.3IONS-SCNC: 10 MMOL/L (ref 7–16)
ANISOCYTOSIS BLD QL SMEAR: ABNORMAL
APTT PPP: 31.9 SECONDS (ref 25.2–37.3)
AST SERPL W P-5'-P-CCNC: 14 U/L (ref 11–45)
BASOPHILS # BLD AUTO: 0.01 K/UL (ref 0–0.2)
BASOPHILS NFR BLD AUTO: 0.2 % (ref 0–1)
BILIRUB SERPL-MCNC: 0.3 MG/DL
BILIRUB UR QL STRIP: NEGATIVE
BUN SERPL-MCNC: 10 MG/DL (ref 7–19)
BUN/CREAT SERPL: 15 (ref 6–20)
CALCIUM SERPL-MCNC: 8.7 MG/DL (ref 8.4–10.2)
CHLORIDE SERPL-SCNC: 106 MMOL/L (ref 98–107)
CLARITY UR: NORMAL
CO2 SERPL-SCNC: 26 MMOL/L (ref 22–29)
COLOR UR: NORMAL
CREAT SERPL-MCNC: 0.68 MG/DL (ref 0.55–1.02)
D DIMER PPP FEU-MCNC: <0.27 ΜG/ML (ref 0–0.47)
DIFFERENTIAL METHOD BLD: ABNORMAL
EGFR (NO RACE VARIABLE) (RUSH/TITUS): 114 ML/MIN/1.73M2
EOSINOPHIL # BLD AUTO: 0.18 K/UL (ref 0–0.5)
EOSINOPHIL NFR BLD AUTO: 3 % (ref 1–4)
ERYTHROCYTE [DISTWIDTH] IN BLOOD BY AUTOMATED COUNT: 14.8 % (ref 11.5–14.5)
GLOBULIN SER-MCNC: 2.7 G/DL (ref 2–4)
GLUCOSE SERPL-MCNC: 83 MG/DL (ref 74–100)
GLUCOSE UR STRIP-MCNC: NEGATIVE MG/DL
HCG UR QL IA.RAPID: NEGATIVE
HCT VFR BLD AUTO: 34.8 % (ref 38–47)
HGB BLD-MCNC: 11.3 G/DL (ref 12–16)
HYPOCHROMIA BLD QL SMEAR: ABNORMAL
IMM GRANULOCYTES # BLD AUTO: 0.01 K/UL (ref 0–0.04)
IMM GRANULOCYTES NFR BLD: 0.2 % (ref 0–0.4)
INR BLD: 0.95
KETONES UR STRIP-SCNC: NEGATIVE MG/DL
LEUKOCYTE ESTERASE UR QL STRIP: NEGATIVE
LYMPHOCYTES # BLD AUTO: 3.57 K/UL (ref 1–4.8)
LYMPHOCYTES NFR BLD AUTO: 59.9 % (ref 27–41)
MAGNESIUM SERPL-MCNC: 2 MG/DL (ref 1.6–2.6)
MCH RBC QN AUTO: 22.6 PG (ref 27–31)
MCHC RBC AUTO-ENTMCNC: 32.5 G/DL (ref 32–36)
MCV RBC AUTO: 69.6 FL (ref 80–96)
MICROCYTES BLD QL SMEAR: ABNORMAL
MONOCYTES # BLD AUTO: 0.4 K/UL (ref 0–0.8)
MONOCYTES NFR BLD AUTO: 6.7 % (ref 2–6)
MPC BLD CALC-MCNC: ABNORMAL G/DL
NEUTROPHILS # BLD AUTO: 1.79 K/UL (ref 1.8–7.7)
NEUTROPHILS NFR BLD AUTO: 30 % (ref 53–65)
NITRITE UR QL STRIP: NEGATIVE
NRBC # BLD AUTO: 0 X10E3/UL
NRBC, AUTO (.00): 0 %
NT-PROBNP SERPL-MCNC: 26 PG/ML (ref 1–125)
PH UR STRIP: 6.5 PH UNITS
PLATELET # BLD AUTO: 159 K/UL (ref 150–400)
PLATELET MORPHOLOGY: ABNORMAL
POTASSIUM SERPL-SCNC: 4.2 MMOL/L (ref 3.5–5.1)
PROT SERPL-MCNC: 6.8 G/DL (ref 6.4–8.3)
PROT UR QL STRIP: NEGATIVE
PROTHROMBIN TIME: 13.4 SECONDS (ref 11.7–14.7)
RBC # BLD AUTO: 5 M/UL (ref 4.2–5.4)
RBC # UR STRIP: NEGATIVE /UL
SODIUM SERPL-SCNC: 138 MMOL/L (ref 136–145)
SP GR UR STRIP: 1.01
TROPONIN I SERPL HS-MCNC: <2.7 NG/L
UROBILINOGEN UR STRIP-ACNC: 0.2 MG/DL
WBC # BLD AUTO: 5.96 K/UL (ref 4.5–11)

## 2025-05-20 PROCEDURE — 83735 ASSAY OF MAGNESIUM: CPT

## 2025-05-20 PROCEDURE — 81025 URINE PREGNANCY TEST: CPT

## 2025-05-20 PROCEDURE — 85025 COMPLETE CBC W/AUTO DIFF WBC: CPT

## 2025-05-20 PROCEDURE — 80053 COMPREHEN METABOLIC PANEL: CPT

## 2025-05-20 PROCEDURE — 99284 EMERGENCY DEPT VISIT MOD MDM: CPT | Mod: GF,,,

## 2025-05-20 PROCEDURE — 84484 ASSAY OF TROPONIN QUANT: CPT

## 2025-05-20 PROCEDURE — 99285 EMERGENCY DEPT VISIT HI MDM: CPT | Mod: 25

## 2025-05-20 PROCEDURE — 85730 THROMBOPLASTIN TIME PARTIAL: CPT

## 2025-05-20 PROCEDURE — 85379 FIBRIN DEGRADATION QUANT: CPT

## 2025-05-20 PROCEDURE — 83880 ASSAY OF NATRIURETIC PEPTIDE: CPT

## 2025-05-20 PROCEDURE — 81003 URINALYSIS AUTO W/O SCOPE: CPT

## 2025-05-20 PROCEDURE — 85610 PROTHROMBIN TIME: CPT

## 2025-05-20 PROCEDURE — 93005 ELECTROCARDIOGRAM TRACING: CPT

## 2025-05-21 LAB — TROPONIN I SERPL HS-MCNC: <2.7 NG/L

## 2025-05-21 NOTE — ED PROVIDER NOTES
Encounter Date: 5/20/2025       History     Chief Complaint   Patient presents with    Shortness of Breath    left leg pain     39-year-old female presents to the ED with a primary complaint of left calf pain over the past 2 weeks.  She also reports tenderness in her left arm and left chest over the past 3 days.  Admits to intermittent dyspnea with no sustained symptoms.  Intermittent nonproductive cough.  Denies fever, chills, vomiting, abdominal pain, syncope, dizziness, or any other complaints at this time.  Blood pressure 112/72, temperature 98.3°, heart rate 85, respirations 18, and oxygen saturation 99% on room air.  She appears in no immediate distress.  Denies any recent injury or trauma.  Denies prior history of DVT or PE.    The history is provided by the patient.     Review of patient's allergies indicates:  No Known Allergies  Past Medical History:   Diagnosis Date    Anxiety     Depression      Past Surgical History:   Procedure Laterality Date    BREAST SURGERY      LEFT HEART CATHETERIZATION N/A 03/19/2024    Procedure: Left heart cath;  Surgeon: August Guajardo MD;  Location: [email or web address] CATH LAB;  Service: Cardiology;  Laterality: N/A;     Family History   Problem Relation Name Age of Onset    Hypertension Mother      Hypertension Father      Stroke Sister      Breast cancer Maternal Grandmother       Social History[1]  Review of Systems   Constitutional:  Negative for activity change, appetite change and fever.   HENT:  Negative for congestion and sore throat.    Eyes: Negative.    Respiratory:  Positive for cough and shortness of breath.    Cardiovascular:  Positive for chest pain. Negative for leg swelling.   Gastrointestinal:  Negative for abdominal pain, nausea and vomiting.   Endocrine: Negative.    Genitourinary:  Negative for dysuria and frequency.   Musculoskeletal:  Negative for back pain, neck pain and neck stiffness.        Left calf pain   Skin:  Negative for color change, pallor and  rash.   Allergic/Immunologic: Negative.    Neurological:  Negative for dizziness, syncope, speech difficulty, weakness and headaches.   Hematological:  Does not bruise/bleed easily.   Psychiatric/Behavioral:  Negative for confusion. The patient is not nervous/anxious.    All other systems reviewed and are negative.      Physical Exam     Initial Vitals [05/20/25 2119]   BP Pulse Resp Temp SpO2   112/72 85 18 98.3 °F (36.8 °C) 99 %      MAP       --         Physical Exam    Nursing note and vitals reviewed.  Constitutional: Vital signs are normal. She appears well-developed and well-nourished. She is not diaphoretic. She is active and cooperative.  Non-toxic appearance. No distress.   HENT:   Head: Normocephalic and atraumatic.   Right Ear: External ear normal.   Left Ear: External ear normal.   Nose: Nose normal. Mouth/Throat: Oropharynx is clear and moist.   Eyes: Conjunctivae and EOM are normal. Pupils are equal, round, and reactive to light.   Neck: Neck supple.   Normal range of motion.   Full passive range of motion without pain.     Cardiovascular:  Normal rate, regular rhythm, normal heart sounds and normal pulses.           Pulmonary/Chest: Effort normal and breath sounds normal. No accessory muscle usage. No tachypnea. No respiratory distress. She has no decreased breath sounds. She has no wheezes. She has no rhonchi. She has no rales. She exhibits tenderness.     Abdominal: Abdomen is soft. Bowel sounds are normal. She exhibits no distension. There is no abdominal tenderness. There is no rebound and no guarding.   Musculoskeletal:         General: Normal range of motion.      Cervical back: Full passive range of motion without pain, normal range of motion and neck supple.        Legs:      Neurological: She is alert and oriented to person, place, and time. She has normal strength. GCS score is 15. GCS eye subscore is 4. GCS verbal subscore is 5. GCS motor subscore is 6.   Skin: Skin is warm and dry.  Capillary refill takes less than 2 seconds.   Psychiatric: She has a normal mood and affect. Her behavior is normal. Judgment and thought content normal.         Medical Screening Exam   See Full Note    ED Course   Procedures  Labs Reviewed   CBC WITH DIFFERENTIAL - Abnormal       Result Value    WBC 5.96      RBC 5.00      Hemoglobin 11.3 (*)     Hematocrit 34.8 (*)     MCV 69.6 (*)     MCH 22.6 (*)     MCHC 32.5      RDW 14.8 (*)     Platelet Count 159      MPV        Neutrophils % 30.0 (*)     Lymphocytes % 59.9 (*)     Monocytes % 6.7 (*)     Eosinophils % 3.0      Basophils % 0.2      Immature Granulocytes % 0.2      nRBC, Auto 0.0      Neutrophils, Abs 1.79 (*)     Lymphocytes, Absolute 3.57      Monocytes, Absolute 0.40      Eosinophils, Absolute 0.18      Basophils, Absolute 0.01      Immature Granulocytes, Absolute 0.01      nRBC, Absolute 0.00      Diff Type Scan Smear     CBC MORPHOLOGY - Abnormal    Platelet Morphology Large Platelets (*)     Anisocytosis 1+      Microcytosis 1+      Hypochromic 1+      Narrative:     Rare large platelets seen on smear.   NT-PRO NATRIURETIC PEPTIDE - Normal    ProBNP 26     D DIMER, QUANTITATIVE - Normal    D-Dimer <0.27     MAGNESIUM - Normal    Magnesium 2.0     TROPONIN I - Normal    Troponin I High Sensitivity <2.7     PROTIME-INR - Normal    PT 13.4      INR 0.95     APTT - Normal    PTT 31.9     HCG QUALITATIVE URINE - Normal    HCG Qualitative, Urine Negative     TROPONIN I - Normal    Troponin I High Sensitivity <2.7     CBC W/ AUTO DIFFERENTIAL    Narrative:     The following orders were created for panel order CBC auto differential.  Procedure                               Abnormality         Status                     ---------                               -----------         ------                     CBC with Differential[[id number]]       Abnormal            Final result                 Please view results for these tests on the individual orders.    COMPREHENSIVE METABOLIC PANEL    Sodium 138      Potassium 4.2      Chloride 106      CO2 26      Anion Gap 10      Glucose 83      BUN 10      Creatinine 0.68      BUN/Creatinine Ratio 15      Calcium 8.7      Total Protein 6.8      Albumin 4.1      Globulin 2.7      A/G Ratio 1.5      Bilirubin, Total 0.3      Alk Phos 50      ALT 11      AST 14      eGFR 114     URINALYSIS, REFLEX TO URINE CULTURE    Color, UA Light Yellow      Clarity, UA Cloudy      pH, UA 6.5      Leukocytes, UA Negative      Nitrites, UA Negative      Protein, UA Negative      Glucose, UA Negative      Ketones, UA Negative      Urobilinogen, UA 0.2      Bilirubin, UA Negative      Blood, UA Negative      Specific Gravity, UA 1.015            Imaging Results              X-Ray Chest PA And Lateral (In process)  Result time 05/20/25 21:17:36   Procedure changed from X-Ray Chest AP Portable                    Medications - No data to display  Medical Decision Making  Presents with primary complaints of left calf pain over the past 2 weeks but admits to left-sided chest pressure.  Nonradiating.  Intermittent dyspnea.  Nonproductive cough.  A&O x4.  GCS 15.  Vital signs stable.  Currently afebrile with a temperature 98.3°.  Room air oxygen saturation 99% on room air.  Not tachypneic.  Speaking in full sentences able to provide an adequate history.  Tolerating p.o. without any vomiting or diarrhea.  Breath sounds equal and clear bilaterally to auscultation.  Left posterior calf pain.  She denies swelling.  No history of DVT or PE.  No recent injury or trauma.  We will send labs and chest x-ray for initial evaluation.    Amount and/or Complexity of Data Reviewed  Independent Historian:      Details: 39-year-old female presents to the ED with a primary complaint of left calf pain over the past 2 weeks.  She also reports tenderness in her left arm and left chest over the past 3 days.  Admits to intermittent dyspnea with no sustained symptoms.   Intermittent nonproductive cough.  Denies fever, chills, vomiting, abdominal pain, syncope, dizziness, or any other complaints at this time.  Blood pressure 112/72, temperature 98.3°, heart rate 85, respirations 18, and oxygen saturation 99% on room air.  She appears in no immediate distress.  Denies any recent injury or trauma.  Denies prior history of DVT or PE.  Labs: ordered.     Details: Urinalysis unremarkable.  Urine pregnancy test negative.  CBC shows a WBC of 5.96, hemoglobin of 11.3, hematocrit 34.8, and a platelet count 159.  D-dimer negative at less than 0.27.  Coags within normal limits.  CMP unremarkable.  Magnesium normal.  Initial troponin negative at less than 2.7.  Pro BNP negative at 26.  Repeat troponin remains negative.  Radiology: ordered.     Details: Chest x-ray shows no acute cardiopulmonary abnormality.  ECG/medicine tests: ordered.     Details: Sinus rhythm with first-degree AV block at 78 beats per minute.  No change from prior comparison on 02/13/2025.  No STEMI.    Risk  Risk Details: Patient presents for emergent evaluation of leg pain and chest pain.  Final diagnoses:  [R06.02] Shortness of breath  [R07.9] Chest pain, unspecified type (Primary)  [S86.812A] Strain of left calf muscle  I ordered labs and personally reviewed them.  Labs significant for negative troponin x2 and negative D-dimer  I ordered X-rays and personally reviewed them and reviewed the radiologist interpretation.  Xray significant for no acute process.    I ordered EKG and personally reviewed it.  EKG significant for no STEMI.    Suspected diagnoses, home care, follow up, and strict ED return precautions were explained in detail.  All questions answered.  She verbalizes understanding and is in agreement with this plan.    Patient was managed in the ED with chest pain and DVT rule out.  The response to treatment was improved.    Patient was discharged in stable condition.  Detailed return precautions discussed.        Additional MDM:   Heart Score:    History:          Slightly suspicious.  ECG:             Normal  Age:               Less than 45 years  Risk factors: 1-2 risk factors  Troponin:       Less than or equal to normal limit  Heart Score = 1                ED Course as of 05/21/25 0026   Tue May 20, 2025   2253 Offered toradol for pain control but she declines at this time and reports that she will take her usual pain medication at home. [MC]      ED Course User Index  [MC] Dano Millard FNP                           Clinical Impression:   Final diagnoses:  [R06.02] Shortness of breath  [R07.9] Chest pain, unspecified type (Primary)  [S86.812A] Strain of left calf muscle        ED Disposition Condition    Discharge Stable          ED Prescriptions    None       Follow-up Information       Follow up With Specialties Details Why Contact Info    Suki Rao FNP-C Family Medicine Schedule an appointment as soon as possible for a visit in 2 days  [address] [place] [address]  [phone]      Tito Valle MD Interventional Cardiology, Cardiology Schedule an appointment as soon as possible for a visit in 2 days  [address]  [place] [address]  [phone]                   [1]   Social History  Tobacco Use    Smoking status: Every Day     Current packs/day: 0.50     Types: Cigarettes     Passive exposure: Current    Smokeless tobacco: Never   Substance Use Topics    Alcohol use: Not Currently     Comment: occasionally    Drug use: Yes     Frequency: 2.0 times per week     Types: Hydrocodone     Comment: last smoked 4 days ago        Dano Millard FNP  05/21/25 0026

## 2025-05-21 NOTE — DISCHARGE INSTRUCTIONS
Continue home medications as prescribed.  Tylenol over-the-counter as directed for pain control.  Follow up with the primary care provider in 1-2 days for a recheck.  Contact your cardiologist and arrange for an appointment for a recheck as soon as available.  Return to the emergency department for uncontrolled pain, difficulty breathing, persistent shortness of breath, weakness, numbness, or any other new or worrisome symptoms.

## 2025-05-21 NOTE — ED TRIAGE NOTES
Presents to ER with c/o shortness of breath for about 1 week. Also complains of left lower leg pain. Has been nauseated some today. Took a norco 10 about 5 pm. States has a history of CHF.

## 2025-07-08 ENCOUNTER — OFFICE VISIT (OUTPATIENT)
Dept: FAMILY MEDICINE | Facility: CLINIC | Age: 40
End: 2025-07-08
Payer: MEDICAID

## 2025-07-08 VITALS
OXYGEN SATURATION: 100 % | DIASTOLIC BLOOD PRESSURE: 55 MMHG | HEIGHT: 62 IN | WEIGHT: 121.81 LBS | SYSTOLIC BLOOD PRESSURE: 89 MMHG | BODY MASS INDEX: 22.41 KG/M2 | HEART RATE: 80 BPM

## 2025-07-08 DIAGNOSIS — I95.9 HYPOTENSION, UNSPECIFIED HYPOTENSION TYPE: ICD-10-CM

## 2025-07-08 DIAGNOSIS — R05.9 COUGH, UNSPECIFIED TYPE: ICD-10-CM

## 2025-07-08 DIAGNOSIS — M62.838 MUSCLE SPASMS OF NECK: Primary | ICD-10-CM

## 2025-07-08 PROCEDURE — 1159F MED LIST DOCD IN RCRD: CPT | Mod: CPTII,,, | Performed by: NURSE PRACTITIONER

## 2025-07-08 PROCEDURE — 3078F DIAST BP <80 MM HG: CPT | Mod: CPTII,,, | Performed by: NURSE PRACTITIONER

## 2025-07-08 PROCEDURE — 99212 OFFICE O/P EST SF 10 MIN: CPT | Mod: ,,, | Performed by: NURSE PRACTITIONER

## 2025-07-08 PROCEDURE — 3074F SYST BP LT 130 MM HG: CPT | Mod: CPTII,,, | Performed by: NURSE PRACTITIONER

## 2025-07-08 PROCEDURE — 3008F BODY MASS INDEX DOCD: CPT | Mod: CPTII,,, | Performed by: NURSE PRACTITIONER

## 2025-07-08 PROCEDURE — 1160F RVW MEDS BY RX/DR IN RCRD: CPT | Mod: CPTII,,, | Performed by: NURSE PRACTITIONER

## 2025-07-08 PROCEDURE — 3044F HG A1C LEVEL LT 7.0%: CPT | Mod: CPTII,,, | Performed by: NURSE PRACTITIONER

## 2025-07-08 NOTE — PROGRESS NOTES
"Subjective:       Patient ID: Windy Alcocer is a 40 y.o. female.    Chief Complaint: Neck Pain, Shoulder Pain, and Cough    HPI    Patient presents c/o neck and shoulder pain along with cough    Neck/shoulder pain began 2 days ago  Describes location of posterior neck, extends into mid upper back on left side  Denies known trigger of pain but admits to sitting in same position for prolonged periods of time on TikTok  Pain causes limit in active ROM of neck  Treated pain with one of her mother's Norco tablets which she says helped some  Says that muscle relaxers "put me under so bad"       Mentions history of anxiety  Previous PCP Dr. Morrison who is no longer at clinic    Scheduled for cardiology appt in August  Mentions possible dx of CHF in the past  Avoids NSAIDs  Aware of low BP in clinic today but patient says that this is normal for her    Reports cough at night for the past 2-3 days  Says that they have a new puppy inside the home  Denies SOB, wheezing, or hx of asthma    BP (!) 89/55 (BP Location: Left arm, Patient Position: Sitting)   Pulse 80   Ht 5' 2" (1.575 m)   Wt 55.2 kg (121 lb 12.8 oz)   SpO2 100%   BMI 22.28 kg/m²     Medication List with Changes/Refills   Current Medications    CLONAZEPAM (KLONOPIN) 1 MG TABLET    Take 1 tablet (1 mg total) by mouth 2 (two) times daily.    CYANOCOBALAMIN-METHYLCOBALAMIN 600-600 MCG SUBL    Place 1 tablet under the tongue once daily.    ERGOCALCIFEROL (ERGOCALCIFEROL) 50,000 UNIT CAP    Take 1 capsule (50,000 Units total) by mouth every 30 days.    PERMETHRIN (ELIMITE) 5 % CREAM    Apply cream from head to feet, leave on overnight, then wash with soap/water, repeat in 7-10 days    TRIAMCINOLONE ACETONIDE 0.1% (KENALOG) 0.1 % OINTMENT    Apply topically 2 (two) times daily.       Review of Systems   Constitutional:  Negative for chills, fatigue, fever and unexpected weight change.   HENT:  Negative for sneezing and sore throat.    Respiratory:  Positive for cough. " Negative for chest tightness, shortness of breath and wheezing.    Musculoskeletal:  Positive for myalgias and neck pain.   Neurological:  Negative for headaches.         Objective:      Physical Exam  Vitals and nursing note reviewed.   Constitutional:       General: She is not in acute distress.     Appearance: Normal appearance.   HENT:      Head: Normocephalic.      Nose: Nose normal.   Eyes:      Conjunctiva/sclera: Conjunctivae normal.   Neck:      Trachea: Trachea normal.   Cardiovascular:      Rate and Rhythm: Normal rate and regular rhythm.      Pulses: Normal pulses.      Heart sounds: Normal heart sounds.   Pulmonary:      Effort: Pulmonary effort is normal. No respiratory distress.      Breath sounds: Normal breath sounds. No wheezing, rhonchi or rales.   Musculoskeletal:         General: No tenderness or signs of injury.      Cervical back: Neck supple.      Comments: Limited active ROM of neck rotation; attributes to pain in posterior neck and left upper back   Skin:     General: Skin is warm and dry.   Neurological:      General: No focal deficit present.      Mental Status: She is alert and oriented to person, place, and time.      Gait: Gait normal.   Psychiatric:         Mood and Affect: Mood normal.         Behavior: Behavior normal.         Assessment:       1. Muscle spasms of neck    2. Hypotension, unspecified hypotension type    3. Cough, unspecified type        Plan:       Patient Instructions   Patient voices hesitancy to add medication due to possibility of adverse effects of NSAIDs; also known intolerance to muscle relaxants.   Plan to try supportive care measures and follow up if symptoms persist/worsen  Encouraged to attend cardiology follow up visit as scheduled  Also highly encouraged to establish care with new PCP since her previous physician is no longer at this clinic.    Muscle spasms of neck    Hypotension, unspecified hypotension type    Cough, unspecified type

## 2025-07-08 NOTE — PATIENT INSTRUCTIONS
Patient voices hesitancy to add medication due to possibility of adverse effects of NSAIDs; also known intolerance to muscle relaxants.   Plan to try supportive care measures and follow up if symptoms persist/worsen  Encouraged to attend cardiology follow up visit as scheduled  Also highly encouraged to establish care with new PCP since her previous physician is no longer at this clinic.

## 2025-07-21 ENCOUNTER — OFFICE VISIT (OUTPATIENT)
Dept: FAMILY MEDICINE | Facility: CLINIC | Age: 40
End: 2025-07-21
Payer: MEDICAID

## 2025-07-21 VITALS
BODY MASS INDEX: 22.01 KG/M2 | DIASTOLIC BLOOD PRESSURE: 58 MMHG | SYSTOLIC BLOOD PRESSURE: 89 MMHG | WEIGHT: 119.63 LBS | HEART RATE: 105 BPM | HEIGHT: 62 IN

## 2025-07-21 DIAGNOSIS — D69.6 THROMBOCYTOPENIA: Primary | ICD-10-CM

## 2025-07-21 DIAGNOSIS — E55.9 VITAMIN D DEFICIENCY: ICD-10-CM

## 2025-07-21 DIAGNOSIS — E53.8 VITAMIN B12 DEFICIENCY: ICD-10-CM

## 2025-07-21 LAB
25(OH)D3 SERPL-MCNC: 27.8 NG/ML (ref 30–80)
ALBUMIN SERPL BCP-MCNC: 4.1 G/DL (ref 3.5–5)
ALBUMIN/GLOB SERPL: 1.5 {RATIO}
ALP SERPL-CCNC: 44 U/L (ref 40–150)
ALT SERPL W P-5'-P-CCNC: 7 U/L
ANION GAP SERPL CALCULATED.3IONS-SCNC: 10 MMOL/L (ref 7–16)
ANISOCYTOSIS BLD QL SMEAR: ABNORMAL
AST SERPL W P-5'-P-CCNC: 13 U/L (ref 11–45)
BASOPHILS # BLD AUTO: 0.02 K/UL (ref 0–0.2)
BASOPHILS NFR BLD AUTO: 0.5 % (ref 0–1)
BILIRUB SERPL-MCNC: 0.6 MG/DL
BUN SERPL-MCNC: 10 MG/DL (ref 7–19)
BUN/CREAT SERPL: 13 (ref 6–20)
CALCIUM SERPL-MCNC: 8.6 MG/DL (ref 8.4–10.2)
CHLORIDE SERPL-SCNC: 106 MMOL/L (ref 98–107)
CO2 SERPL-SCNC: 27 MMOL/L (ref 22–29)
CREAT SERPL-MCNC: 0.77 MG/DL (ref 0.55–1.02)
EGFR (NO RACE VARIABLE) (RUSH/TITUS): 101 ML/MIN/1.73M2
EOSINOPHIL # BLD AUTO: 0.15 K/UL (ref 0–0.5)
EOSINOPHIL NFR BLD AUTO: 3.6 % (ref 1–4)
ERYTHROCYTE [DISTWIDTH] IN BLOOD BY AUTOMATED COUNT: 14.6 % (ref 11.5–14.5)
FERRITIN SERPL-MCNC: 49 NG/ML (ref 5–204)
FOLATE SERPL-MCNC: 28.4 NG/ML (ref 7–31.4)
GLOBULIN SER-MCNC: 2.7 G/DL (ref 2–4)
GLUCOSE SERPL-MCNC: 75 MG/DL (ref 74–100)
HCT VFR BLD AUTO: 37.3 % (ref 38–47)
HGB BLD-MCNC: 11.5 G/DL (ref 12–16)
HIV 1+O+2 AB SERPL QL: NORMAL
IMM GRANULOCYTES # BLD AUTO: 0.01 K/UL (ref 0–0.04)
IMM GRANULOCYTES NFR BLD: 0.2 % (ref 0–0.4)
IRON SATN MFR SERPL: 38 % (ref 20–50)
IRON SERPL-MCNC: 108 UG/DL (ref 50–170)
LYMPHOCYTES # BLD AUTO: 2.23 K/UL (ref 1–4.8)
LYMPHOCYTES NFR BLD AUTO: 53.7 % (ref 27–41)
MCH RBC QN AUTO: 22.6 PG (ref 27–31)
MCHC RBC AUTO-ENTMCNC: 30.8 G/DL (ref 32–36)
MCV RBC AUTO: 73.3 FL (ref 80–96)
MICROCYTES BLD QL SMEAR: ABNORMAL
MONOCYTES # BLD AUTO: 0.43 K/UL (ref 0–0.8)
MONOCYTES NFR BLD AUTO: 10.4 % (ref 2–6)
MPC BLD CALC-MCNC: ABNORMAL G/DL
NEUTROPHILS # BLD AUTO: 1.31 K/UL (ref 1.8–7.7)
NEUTROPHILS NFR BLD AUTO: 31.6 % (ref 53–65)
NRBC # BLD AUTO: 0 X10E3/UL
NRBC, AUTO (.00): 0 %
PLATELET # BLD AUTO: 142 K/UL (ref 150–400)
PLATELET MORPHOLOGY: ABNORMAL
POTASSIUM SERPL-SCNC: 4.4 MMOL/L (ref 3.5–5.1)
PROT SERPL-MCNC: 6.8 G/DL (ref 6.4–8.3)
RBC # BLD AUTO: 5.09 M/UL (ref 4.2–5.4)
SCHISTOCYTES BLD QL AUTO: ABNORMAL
SODIUM SERPL-SCNC: 139 MMOL/L (ref 136–145)
TIBC SERPL-MCNC: 174 UG/DL (ref 70–310)
TIBC SERPL-MCNC: 282 UG/DL (ref 250–450)
TRANSFERRIN SERPL-MCNC: 242 MG/DL (ref 180–382)
VIT B12 SERPL-MCNC: 457 PG/ML (ref 213–816)
WBC # BLD AUTO: 4.15 K/UL (ref 4.5–11)

## 2025-07-21 PROCEDURE — 82728 ASSAY OF FERRITIN: CPT | Mod: ,,, | Performed by: CLINICAL MEDICAL LABORATORY

## 2025-07-21 PROCEDURE — 1159F MED LIST DOCD IN RCRD: CPT | Mod: CPTII,,,

## 2025-07-21 PROCEDURE — 3078F DIAST BP <80 MM HG: CPT | Mod: CPTII,,,

## 2025-07-21 PROCEDURE — 3074F SYST BP LT 130 MM HG: CPT | Mod: CPTII,,,

## 2025-07-21 PROCEDURE — 82306 VITAMIN D 25 HYDROXY: CPT | Mod: ,,, | Performed by: CLINICAL MEDICAL LABORATORY

## 2025-07-21 PROCEDURE — 3008F BODY MASS INDEX DOCD: CPT | Mod: CPTII,,,

## 2025-07-21 PROCEDURE — 3044F HG A1C LEVEL LT 7.0%: CPT | Mod: CPTII,,,

## 2025-07-21 PROCEDURE — 85025 COMPLETE CBC W/AUTO DIFF WBC: CPT | Mod: ,,, | Performed by: CLINICAL MEDICAL LABORATORY

## 2025-07-21 PROCEDURE — 80053 COMPREHEN METABOLIC PANEL: CPT | Mod: ,,, | Performed by: CLINICAL MEDICAL LABORATORY

## 2025-07-21 PROCEDURE — 87389 HIV-1 AG W/HIV-1&-2 AB AG IA: CPT | Mod: ,,, | Performed by: CLINICAL MEDICAL LABORATORY

## 2025-07-21 PROCEDURE — 82746 ASSAY OF FOLIC ACID SERUM: CPT | Mod: ,,, | Performed by: CLINICAL MEDICAL LABORATORY

## 2025-07-21 PROCEDURE — 83550 IRON BINDING TEST: CPT | Mod: ,,, | Performed by: CLINICAL MEDICAL LABORATORY

## 2025-07-21 PROCEDURE — 83540 ASSAY OF IRON: CPT | Mod: ,,, | Performed by: CLINICAL MEDICAL LABORATORY

## 2025-07-21 PROCEDURE — 82607 VITAMIN B-12: CPT | Mod: ,,, | Performed by: CLINICAL MEDICAL LABORATORY

## 2025-07-21 PROCEDURE — 99214 OFFICE O/P EST MOD 30 MIN: CPT | Mod: ,,,

## 2025-07-21 NOTE — PROGRESS NOTES
Subjective     Patient ID: Windy Alcocer is a 40 y.o. female.    Chief Complaint: Follow-up and Health Maintenance (HIV Screening Never done/TETANUS VACCINE Never done/Aspirin/Antiplatelet Therapy Never done/Pneumococcal Vaccines (Age 0-49)(1 of 2 - PCV) Never done/COVID-19 Vaccine(1 - 2024-25 season) Never done/Vaccnes refused)    History of Present Illness    CHIEF COMPLAINT:  Patient presents today for follow up of lab work including Vitamin D, B12, and iron studies.    LABS AND SUPPLEMENTS:  She reports B12 levels were previously climbing and are now in mid-range. Iron levels have historically fluctuated. She continues Vitamin D liquid drops 5000 IU daily.    PSYCHOSOCIAL STRESSORS:  She reports significant stress related to family medical and behavioral challenges. Her mother has COPD and is currently hospitalized at [place] for 10 days. Mother was started on Xanax during hospitalization and continues to use cigarettes and crack cocaine despite her medical condition. Her 15-year-old son is currently using marijuana and Percocet with possible ecstasy use. He is currently on probation and previously attempted treatment at [facility] last summer, followed by a stay with his uncle in [place] to address behavioral issues.    MEDICATIONS:  She continues Klonopin daily. While interested in eventually reducing her medication use, she acknowledges that current life stressors make this an inappropriate time for dose reduction.            Review of Systems   Constitutional:  Positive for fatigue. Negative for unexpected weight change.   Respiratory:  Negative for cough, chest tightness, shortness of breath and wheezing.    Cardiovascular:  Negative for chest pain, palpitations and leg swelling.   Gastrointestinal:  Negative for abdominal pain, blood in stool, constipation, diarrhea, nausea and vomiting.   Genitourinary:  Negative for dysuria.   Skin:  Negative for color change and rash.   Neurological:  Negative for  "dizziness, weakness, light-headedness and headaches.   Hematological:  Does not bruise/bleed easily.   Psychiatric/Behavioral:  The patient is nervous/anxious.          Vital Signs  Pulse: 105  BP: (!) 89/58  BP Location: Left arm  Patient Position: Sitting  Pain Score: 0-No pain  Height and Weight  Height: 5' 2" (157.5 cm)  Weight: 54.3 kg (119 lb 9.6 oz)  BSA (Calculated - sq m): 1.54 sq meters  BMI (Calculated): 21.9  Weight in (lb) to have BMI = 25: 136.4]    Physical Exam  Vitals and nursing note reviewed.   Constitutional:       Appearance: Normal appearance. She is normal weight.   Cardiovascular:      Rate and Rhythm: Normal rate and regular rhythm.      Pulses: Normal pulses.      Heart sounds: Normal heart sounds.   Pulmonary:      Effort: Pulmonary effort is normal.      Breath sounds: Normal breath sounds.   Abdominal:      General: Abdomen is flat. Bowel sounds are normal.      Palpations: Abdomen is soft.   Musculoskeletal:         General: Normal range of motion.   Skin:     General: Skin is warm and dry.      Capillary Refill: Capillary refill takes less than 2 seconds.   Neurological:      General: No focal deficit present.      Mental Status: She is alert and oriented to person, place, and time.   Psychiatric:         Mood and Affect: Mood normal.         Behavior: Behavior normal.         Thought Content: Thought content normal.         Judgment: Judgment normal.            Assessment and Plan     1. Thrombocytopenia  -     Comprehensive Metabolic Panel; Future; Expected date: 07/21/2025  -     CBC Auto Differential; Future; Expected date: 07/21/2025  -     HIV 1/2 Ag/Ab (4th Gen); Future; Expected date: 07/21/2025  -     Iron and TIBC; Future; Expected date: 07/21/2025  -     Ferritin; Future; Expected date: 07/21/2025    2. Vitamin D deficiency  -     Vitamin D; Future; Expected date: 07/21/2025    3. Vitamin B12 deficiency  -     Vitamin B12 & Folate; Future; Expected date: " 07/21/2025      Assessment & Plan    E55.9 Vitamin D deficiency, unspecified  Z63.79 Other stressful life events affecting family and household  Z82.5 Family history of asthma and other chronic lower respiratory diseases    VITAMIN D DEFICIENCY:  - Patient is currently taking 5,000 IU of Vitamin D liquid drops daily.  - Ordered CBC, ferritin, iron studies, vitamin D level, and B12 level to evaluate need for vitamin and iron supplementation based on previous lab results.  - Will recheck Vitamin D levels and renew prescriptions if levels remain low.    CAREGIVER STRESS AND FAMILY ISSUES:  - Patient is experiencing stress due to caring for her mother with COPD while managing her own family, placing her in a 'sandwich' situation.  - She has lost weight due to stress over the past 1.5 weeks.  - Recommend involving teenage children to sit with grandmother occasionally to provide companionship and alleviate some caregiver burden.  - Discussed strategies for balancing care responsibilities for both children and parent.  - Noted potential risks associated with son's reported substance use.             Follow up in about 3 months (around 10/21/2025).    This note was generated with the assistance of ambient listening technology. Verbal consent was obtained by the patient and accompanying visitor(s) for the recording of patient appointment to facilitate this note. I attest to having reviewed and edited the generated note for accuracy, though some syntax or spelling errors may persist. Please contact the author of this note for any clarification.         I spent a total of 25 minutes on the day of the visit.This includes face to face time and non-face to face time preparing to see the patient (eg, review of tests), obtaining and/or reviewing separately obtained history, documenting clinical information in the electronic or other health record, independently interpreting results and communicating results to the  patient/family/caregiver, or care coordinator.    KATHY Dumont

## 2025-08-25 ENCOUNTER — PATIENT MESSAGE (OUTPATIENT)
Dept: FAMILY MEDICINE | Facility: CLINIC | Age: 40
End: 2025-08-25
Payer: MEDICAID

## 2025-09-02 ENCOUNTER — OFFICE VISIT (OUTPATIENT)
Dept: FAMILY MEDICINE | Facility: CLINIC | Age: 40
End: 2025-09-02
Payer: MEDICAID

## 2025-09-02 VITALS
HEIGHT: 62 IN | OXYGEN SATURATION: 99 % | HEART RATE: 104 BPM | TEMPERATURE: 99 F | BODY MASS INDEX: 21.6 KG/M2 | DIASTOLIC BLOOD PRESSURE: 61 MMHG | SYSTOLIC BLOOD PRESSURE: 94 MMHG | WEIGHT: 117.38 LBS

## 2025-09-02 DIAGNOSIS — F43.9 REACTION TO SEVERE STRESS: ICD-10-CM

## 2025-09-02 DIAGNOSIS — Z79.899 HIGH RISK MEDICATION USE: Primary | ICD-10-CM

## 2025-09-02 PROBLEM — R25.3 MUSCLE TWITCHING: Status: RESOLVED | Noted: 2024-10-23 | Resolved: 2025-09-02

## 2025-09-02 PROBLEM — E53.8 VITAMIN B12 DEFICIENCY: Status: RESOLVED | Noted: 2025-02-26 | Resolved: 2025-09-02

## 2025-09-02 PROBLEM — Z71.2 ENCOUNTER TO DISCUSS TEST RESULTS: Status: RESOLVED | Noted: 2024-03-12 | Resolved: 2025-09-02

## 2025-09-02 PROBLEM — M54.42 ACUTE LEFT-SIDED LOW BACK PAIN WITH LEFT-SIDED SCIATICA: Status: RESOLVED | Noted: 2024-09-09 | Resolved: 2025-09-02

## 2025-09-02 PROBLEM — M79.89 LEFT ARM SWELLING: Status: RESOLVED | Noted: 2024-09-26 | Resolved: 2025-09-02

## 2025-09-02 PROBLEM — T14.8XXA BRUISING: Status: RESOLVED | Noted: 2024-05-20 | Resolved: 2025-09-02

## 2025-09-02 PROBLEM — D50.9 MICROCYTIC ANEMIA: Chronic | Status: RESOLVED | Noted: 2024-03-12 | Resolved: 2025-09-02

## 2025-09-02 PROBLEM — M54.2 NECK PAIN: Chronic | Status: RESOLVED | Noted: 2024-08-20 | Resolved: 2025-09-02

## 2025-09-02 PROBLEM — R59.1 LYMPHADENOPATHY: Status: RESOLVED | Noted: 2024-08-07 | Resolved: 2025-09-02

## 2025-09-02 PROBLEM — R07.89 NON-CARDIAC CHEST PAIN: Chronic | Status: RESOLVED | Noted: 2025-03-03 | Resolved: 2025-09-02

## 2025-09-02 LAB
CTP QC/QA: YES
POC (AMP) AMPHETAMINE: NEGATIVE
POC (BAR) BARBITURATES: NEGATIVE
POC (BUP) BUPRENORPHINE: NEGATIVE
POC (BZO) BENZODIAZEPINES: ABNORMAL
POC (COC) COCAINE: NEGATIVE
POC (MDMA) METHYLENEDIOXYMETHAMPHETAMINE 3,4: NEGATIVE
POC (MET) METHAMPHETAMINE: NEGATIVE
POC (MOP) OPIATES: NEGATIVE
POC (MTD) METHADONE: NEGATIVE
POC (OXY) OXYCODONE: ABNORMAL
POC (PCP) PHENCYCLIDINE: NEGATIVE
POC (TCA) TRICYCLIC ANTIDEPRESSANTS: NEGATIVE
POC TEMPERATURE (URINE): ABNORMAL
POC THC: NEGATIVE

## 2025-09-02 PROCEDURE — 80305 DRUG TEST PRSMV DIR OPT OBS: CPT | Mod: RHCUB

## 2025-09-02 RX ORDER — NAPROXEN SODIUM 220 MG/1
81 TABLET, FILM COATED ORAL DAILY
Qty: 90 TABLET | Refills: 3 | Status: SHIPPED | OUTPATIENT
Start: 2025-09-02 | End: 2026-09-02

## 2025-09-02 RX ORDER — CLONAZEPAM 1 MG/1
1 TABLET ORAL 2 TIMES DAILY
Qty: 180 TABLET | Refills: 1 | Status: SHIPPED | OUTPATIENT
Start: 2025-09-02 | End: 2026-03-01

## (undated) DEVICE — DRESSING TRANS 4X4 TEGADERM

## (undated) DEVICE — SET EXT CATH NONDEHP .8 6.5IN

## (undated) DEVICE — CATH FL 3.5 5FR

## (undated) DEVICE — SET IV PRIMARY

## (undated) DEVICE — Device

## (undated) DEVICE — DECANTER FLUID TRNSF WHITE 9IN

## (undated) DEVICE — ETCO2 NC MICROSTR FEM ST ADLT

## (undated) DEVICE — CATH IV 20G 1.16 IN AUTOGARD

## (undated) DEVICE — KIT GLIDESHEATH NIT 6FR 10CM

## (undated) DEVICE — CUFF FLEXIPORT BP LONG ADULT

## (undated) DEVICE — DRAPE ANGIO BRACH 38X44IN

## (undated) DEVICE — TR BAND REG

## (undated) DEVICE — NDL PERCUTANEOUS 2.5CM 21G

## (undated) DEVICE — KIT IV START WITH PREVANTICS

## (undated) DEVICE — CATH IMPULSE 5F 100CM FR4

## (undated) DEVICE — KIT MINI STICK MAX 5F 21GX10CM

## (undated) DEVICE — GLOVE SENSICARE PI SURG 7

## (undated) DEVICE — BAND TR COMP DEVICE REG 24CM

## (undated) DEVICE — CONTRAST ISOVUE 370 100ML

## (undated) DEVICE — OXISENSOR ADULT DIGIT N/S

## (undated) DEVICE — SET EXTENSION CLEARLINK 2INJ

## (undated) DEVICE — CHLORAPREP 10.5 ML APPLICATOR

## (undated) DEVICE — TOWEL OR DISP STRL BLUE 4/PK

## (undated) DEVICE — PROTECTOR ULNAR NERVE FOAM

## (undated) DEVICE — CLOTH READYPREP 2%CHG 9X10.5IN

## (undated) DEVICE — LEADWIRE DL DC EKG 10 PIN

## (undated) DEVICE — BOWL FLUID - BACK STOP